# Patient Record
Sex: FEMALE | Race: BLACK OR AFRICAN AMERICAN | NOT HISPANIC OR LATINO | Employment: OTHER | URBAN - METROPOLITAN AREA
[De-identification: names, ages, dates, MRNs, and addresses within clinical notes are randomized per-mention and may not be internally consistent; named-entity substitution may affect disease eponyms.]

---

## 2017-08-02 ENCOUNTER — APPOINTMENT (EMERGENCY)
Dept: RADIOLOGY | Facility: HOSPITAL | Age: 72
End: 2017-08-02
Payer: COMMERCIAL

## 2017-08-02 ENCOUNTER — APPOINTMENT (EMERGENCY)
Dept: CT IMAGING | Facility: HOSPITAL | Age: 72
End: 2017-08-02
Payer: COMMERCIAL

## 2017-08-02 ENCOUNTER — HOSPITAL ENCOUNTER (OUTPATIENT)
Facility: HOSPITAL | Age: 72
Setting detail: OBSERVATION
Discharge: HOME | End: 2017-08-05
Attending: EMERGENCY MEDICINE | Admitting: INTERNAL MEDICINE
Payer: COMMERCIAL

## 2017-08-02 DIAGNOSIS — I16.0 HYPERTENSIVE URGENCY: Primary | ICD-10-CM

## 2017-08-02 PROBLEM — J32.9 SINUSITIS: Status: ACTIVE | Noted: 2017-08-02

## 2017-08-02 PROBLEM — D69.6 THROMBOCYTOPENIA (HCC): Status: ACTIVE | Noted: 2017-08-02

## 2017-08-02 PROBLEM — E87.6 HYPOKALEMIA: Status: ACTIVE | Noted: 2017-08-02

## 2017-08-02 PROBLEM — R42 DIZZINESS: Status: ACTIVE | Noted: 2017-08-02

## 2017-08-02 LAB
ALBUMIN SERPL BCP-MCNC: 3.7 G/DL (ref 3.5–5)
ALP SERPL-CCNC: 58 U/L (ref 46–116)
ALT SERPL W P-5'-P-CCNC: 29 U/L (ref 12–78)
ANION GAP SERPL CALCULATED.3IONS-SCNC: 7 MMOL/L (ref 4–13)
APTT PPP: 24 SECONDS (ref 23–35)
AST SERPL W P-5'-P-CCNC: 27 U/L (ref 5–45)
BACTERIA UR QL AUTO: ABNORMAL /HPF
BASOPHILS # BLD AUTO: 0.03 THOUSANDS/ΜL (ref 0–0.1)
BASOPHILS NFR BLD AUTO: 1 % (ref 0–1)
BILIRUB SERPL-MCNC: 0.8 MG/DL (ref 0.2–1)
BILIRUB UR QL STRIP: NEGATIVE
BUN SERPL-MCNC: 18 MG/DL (ref 5–25)
CALCIUM SERPL-MCNC: 9.7 MG/DL (ref 8.3–10.1)
CHLORIDE SERPL-SCNC: 102 MMOL/L (ref 100–108)
CLARITY UR: CLEAR
CO2 SERPL-SCNC: 31 MMOL/L (ref 21–32)
COLOR UR: YELLOW
CREAT SERPL-MCNC: 0.85 MG/DL (ref 0.6–1.3)
EOSINOPHIL # BLD AUTO: 0.12 THOUSAND/ΜL (ref 0–0.61)
EOSINOPHIL NFR BLD AUTO: 2 % (ref 0–6)
ERYTHROCYTE [DISTWIDTH] IN BLOOD BY AUTOMATED COUNT: 14.7 % (ref 11.6–15.1)
GFR SERPL CREATININE-BSD FRML MDRD: 79 ML/MIN/1.73SQ M
GLUCOSE SERPL-MCNC: 91 MG/DL (ref 65–140)
GLUCOSE UR STRIP-MCNC: NEGATIVE MG/DL
HCT VFR BLD AUTO: 41.4 % (ref 34.8–46.1)
HGB BLD-MCNC: 13.1 G/DL (ref 11.5–15.4)
HGB UR QL STRIP.AUTO: ABNORMAL
INR PPP: 0.82 (ref 0.86–1.16)
KETONES UR STRIP-MCNC: NEGATIVE MG/DL
LEUKOCYTE ESTERASE UR QL STRIP: NEGATIVE
LYMPHOCYTES # BLD AUTO: 1.92 THOUSANDS/ΜL (ref 0.6–4.47)
LYMPHOCYTES NFR BLD AUTO: 39 % (ref 14–44)
MCH RBC QN AUTO: 28.9 PG (ref 26.8–34.3)
MCHC RBC AUTO-ENTMCNC: 31.6 G/DL (ref 31.4–37.4)
MCV RBC AUTO: 91 FL (ref 82–98)
MONOCYTES # BLD AUTO: 0.38 THOUSAND/ΜL (ref 0.17–1.22)
MONOCYTES NFR BLD AUTO: 8 % (ref 4–12)
NEUTROPHILS # BLD AUTO: 2.46 THOUSANDS/ΜL (ref 1.85–7.62)
NEUTS SEG NFR BLD AUTO: 50 % (ref 43–75)
NITRITE UR QL STRIP: NEGATIVE
NON-SQ EPI CELLS URNS QL MICRO: ABNORMAL /HPF
NRBC BLD AUTO-RTO: 0 /100 WBCS
PH UR STRIP.AUTO: 7 [PH] (ref 4.5–8)
PLATELET # BLD AUTO: 128 THOUSANDS/UL (ref 149–390)
PMV BLD AUTO: 12.8 FL (ref 8.9–12.7)
POTASSIUM SERPL-SCNC: 2.9 MMOL/L (ref 3.5–5.3)
PROT SERPL-MCNC: 7.6 G/DL (ref 6.4–8.2)
PROT UR STRIP-MCNC: NEGATIVE MG/DL
PROTHROMBIN TIME: 11.5 SECONDS (ref 12.1–14.4)
RBC # BLD AUTO: 4.54 MILLION/UL (ref 3.81–5.12)
RBC #/AREA URNS AUTO: ABNORMAL /HPF
SODIUM SERPL-SCNC: 140 MMOL/L (ref 136–145)
SP GR UR STRIP.AUTO: 1.01 (ref 1–1.03)
TROPONIN I SERPL-MCNC: <0.02 NG/ML
UROBILINOGEN UR QL STRIP.AUTO: 0.2 E.U./DL
WBC # BLD AUTO: 4.92 THOUSAND/UL (ref 4.31–10.16)
WBC #/AREA URNS AUTO: ABNORMAL /HPF

## 2017-08-02 PROCEDURE — 85025 COMPLETE CBC W/AUTO DIFF WBC: CPT | Performed by: PHYSICIAN ASSISTANT

## 2017-08-02 PROCEDURE — 84484 ASSAY OF TROPONIN QUANT: CPT | Performed by: PHYSICIAN ASSISTANT

## 2017-08-02 PROCEDURE — 93005 ELECTROCARDIOGRAM TRACING: CPT | Performed by: PHYSICIAN ASSISTANT

## 2017-08-02 PROCEDURE — 85610 PROTHROMBIN TIME: CPT | Performed by: PHYSICIAN ASSISTANT

## 2017-08-02 PROCEDURE — 36415 COLL VENOUS BLD VENIPUNCTURE: CPT | Performed by: PHYSICIAN ASSISTANT

## 2017-08-02 PROCEDURE — 81001 URINALYSIS AUTO W/SCOPE: CPT | Performed by: PHYSICIAN ASSISTANT

## 2017-08-02 PROCEDURE — 96374 THER/PROPH/DIAG INJ IV PUSH: CPT

## 2017-08-02 PROCEDURE — 80053 COMPREHEN METABOLIC PANEL: CPT | Performed by: PHYSICIAN ASSISTANT

## 2017-08-02 PROCEDURE — 70450 CT HEAD/BRAIN W/O DYE: CPT

## 2017-08-02 PROCEDURE — 85730 THROMBOPLASTIN TIME PARTIAL: CPT | Performed by: PHYSICIAN ASSISTANT

## 2017-08-02 PROCEDURE — 71020 HB CHEST X-RAY 2VW FRONTAL&LATL: CPT

## 2017-08-02 PROCEDURE — 99285 EMERGENCY DEPT VISIT HI MDM: CPT

## 2017-08-02 RX ORDER — ACETAMINOPHEN 325 MG/1
650 TABLET ORAL EVERY 6 HOURS PRN
Status: DISCONTINUED | OUTPATIENT
Start: 2017-08-02 | End: 2017-08-05 | Stop reason: HOSPADM

## 2017-08-02 RX ORDER — POTASSIUM CHLORIDE 20 MEQ/1
20 TABLET, EXTENDED RELEASE ORAL ONCE
Status: COMPLETED | OUTPATIENT
Start: 2017-08-02 | End: 2017-08-02

## 2017-08-02 RX ORDER — OMEPRAZOLE 20 MG/1
20 CAPSULE, DELAYED RELEASE ORAL DAILY
COMMUNITY
Start: 2017-04-10

## 2017-08-02 RX ORDER — POTASSIUM CHLORIDE 750 MG/1
30 TABLET, EXTENDED RELEASE ORAL DAILY
COMMUNITY
Start: 2017-05-31 | End: 2017-08-05 | Stop reason: HOSPADM

## 2017-08-02 RX ORDER — CHLORTHALIDONE 50 MG/1
25 TABLET ORAL DAILY
COMMUNITY
Start: 2017-05-31 | End: 2017-08-05 | Stop reason: HOSPADM

## 2017-08-02 RX ORDER — CALCIUM CARBONATE 200(500)MG
1000 TABLET,CHEWABLE ORAL DAILY PRN
Status: DISCONTINUED | OUTPATIENT
Start: 2017-08-02 | End: 2017-08-05 | Stop reason: HOSPADM

## 2017-08-02 RX ORDER — ASPIRIN 81 MG/1
81 TABLET, CHEWABLE ORAL DAILY
COMMUNITY
Start: 2012-09-20

## 2017-08-02 RX ORDER — LABETALOL HYDROCHLORIDE 5 MG/ML
10 INJECTION, SOLUTION INTRAVENOUS ONCE
Status: COMPLETED | OUTPATIENT
Start: 2017-08-02 | End: 2017-08-02

## 2017-08-02 RX ORDER — HYDRALAZINE HYDROCHLORIDE 20 MG/ML
10 INJECTION INTRAMUSCULAR; INTRAVENOUS EVERY 4 HOURS PRN
Status: DISCONTINUED | OUTPATIENT
Start: 2017-08-02 | End: 2017-08-05 | Stop reason: HOSPADM

## 2017-08-02 RX ORDER — METOPROLOL SUCCINATE 100 MG/1
100 TABLET, EXTENDED RELEASE ORAL DAILY
COMMUNITY
Start: 2017-05-31 | End: 2017-08-05 | Stop reason: HOSPADM

## 2017-08-02 RX ORDER — ONDANSETRON 2 MG/ML
4 INJECTION INTRAMUSCULAR; INTRAVENOUS EVERY 6 HOURS PRN
Status: DISCONTINUED | OUTPATIENT
Start: 2017-08-02 | End: 2017-08-05 | Stop reason: HOSPADM

## 2017-08-02 RX ADMIN — HYDRALAZINE HYDROCHLORIDE 10 MG: 20 INJECTION INTRAMUSCULAR; INTRAVENOUS at 23:56

## 2017-08-02 RX ADMIN — POTASSIUM CHLORIDE 20 MEQ: 1500 TABLET, EXTENDED RELEASE ORAL at 21:56

## 2017-08-02 RX ADMIN — LABETALOL HYDROCHLORIDE 10 MG: 5 INJECTION, SOLUTION INTRAVENOUS at 21:57

## 2017-08-03 ENCOUNTER — APPOINTMENT (OUTPATIENT)
Dept: NON INVASIVE DIAGNOSTICS | Facility: HOSPITAL | Age: 72
End: 2017-08-03
Payer: COMMERCIAL

## 2017-08-03 PROBLEM — I16.9 HYPERTENSIVE CRISIS: Status: ACTIVE | Noted: 2017-08-02

## 2017-08-03 PROBLEM — D69.6 THROMBOCYTOPENIA (HCC): Status: RESOLVED | Noted: 2017-08-02 | Resolved: 2017-08-03

## 2017-08-03 LAB
ANION GAP SERPL CALCULATED.3IONS-SCNC: 9 MMOL/L (ref 4–13)
APTT PPP: 27 SECONDS (ref 23–35)
BUN SERPL-MCNC: 14 MG/DL (ref 5–25)
CALCIUM SERPL-MCNC: 9.7 MG/DL (ref 8.3–10.1)
CHLORIDE SERPL-SCNC: 104 MMOL/L (ref 100–108)
CO2 SERPL-SCNC: 30 MMOL/L (ref 21–32)
CREAT SERPL-MCNC: 0.83 MG/DL (ref 0.6–1.3)
ERYTHROCYTE [DISTWIDTH] IN BLOOD BY AUTOMATED COUNT: 14.6 % (ref 11.6–15.1)
GFR SERPL CREATININE-BSD FRML MDRD: 81 ML/MIN/1.73SQ M
GLUCOSE P FAST SERPL-MCNC: 101 MG/DL (ref 65–99)
GLUCOSE SERPL-MCNC: 101 MG/DL (ref 65–140)
HCT VFR BLD AUTO: 40.4 % (ref 34.8–46.1)
HGB BLD-MCNC: 13.2 G/DL (ref 11.5–15.4)
INR PPP: 0.87 (ref 0.86–1.16)
MAGNESIUM SERPL-MCNC: 1.6 MG/DL (ref 1.6–2.6)
MCH RBC QN AUTO: 29.1 PG (ref 26.8–34.3)
MCHC RBC AUTO-ENTMCNC: 32.7 G/DL (ref 31.4–37.4)
MCV RBC AUTO: 89 FL (ref 82–98)
PLATELET # BLD AUTO: 162 THOUSANDS/UL (ref 149–390)
PLATELET # BLD AUTO: 166 THOUSANDS/UL (ref 149–390)
PMV BLD AUTO: 12.7 FL (ref 8.9–12.7)
PMV BLD AUTO: 12.9 FL (ref 8.9–12.7)
POTASSIUM SERPL-SCNC: 2.8 MMOL/L (ref 3.5–5.3)
POTASSIUM SERPL-SCNC: 3.3 MMOL/L (ref 3.5–5.3)
PROTHROMBIN TIME: 12 SECONDS (ref 12.1–14.4)
RBC # BLD AUTO: 4.54 MILLION/UL (ref 3.81–5.12)
SODIUM SERPL-SCNC: 143 MMOL/L (ref 136–145)
TSH SERPL DL<=0.05 MIU/L-ACNC: 5.41 UIU/ML (ref 0.36–3.74)
WBC # BLD AUTO: 5.08 THOUSAND/UL (ref 4.31–10.16)

## 2017-08-03 PROCEDURE — 80048 BASIC METABOLIC PNL TOTAL CA: CPT | Performed by: PHYSICIAN ASSISTANT

## 2017-08-03 PROCEDURE — 84443 ASSAY THYROID STIM HORMONE: CPT | Performed by: PHYSICIAN ASSISTANT

## 2017-08-03 PROCEDURE — 85730 THROMBOPLASTIN TIME PARTIAL: CPT | Performed by: PHYSICIAN ASSISTANT

## 2017-08-03 PROCEDURE — 85049 AUTOMATED PLATELET COUNT: CPT | Performed by: INTERNAL MEDICINE

## 2017-08-03 PROCEDURE — 83735 ASSAY OF MAGNESIUM: CPT | Performed by: PHYSICIAN ASSISTANT

## 2017-08-03 PROCEDURE — 85610 PROTHROMBIN TIME: CPT | Performed by: PHYSICIAN ASSISTANT

## 2017-08-03 PROCEDURE — 93306 TTE W/DOPPLER COMPLETE: CPT

## 2017-08-03 PROCEDURE — 85027 COMPLETE CBC AUTOMATED: CPT | Performed by: PHYSICIAN ASSISTANT

## 2017-08-03 PROCEDURE — 84132 ASSAY OF SERUM POTASSIUM: CPT | Performed by: INTERNAL MEDICINE

## 2017-08-03 RX ORDER — POTASSIUM CHLORIDE 20 MEQ/1
40 TABLET, EXTENDED RELEASE ORAL 2 TIMES DAILY
Status: DISCONTINUED | OUTPATIENT
Start: 2017-08-03 | End: 2017-08-05 | Stop reason: HOSPADM

## 2017-08-03 RX ORDER — CARVEDILOL 12.5 MG/1
25 TABLET ORAL 2 TIMES DAILY WITH MEALS
Status: DISCONTINUED | OUTPATIENT
Start: 2017-08-03 | End: 2017-08-05 | Stop reason: HOSPADM

## 2017-08-03 RX ORDER — POTASSIUM CHLORIDE 20 MEQ/1
40 TABLET, EXTENDED RELEASE ORAL ONCE
Status: COMPLETED | OUTPATIENT
Start: 2017-08-03 | End: 2017-08-03

## 2017-08-03 RX ORDER — CHLORTHALIDONE 25 MG/1
25 TABLET ORAL DAILY
Status: DISCONTINUED | OUTPATIENT
Start: 2017-08-03 | End: 2017-08-05 | Stop reason: HOSPADM

## 2017-08-03 RX ADMIN — ENOXAPARIN SODIUM 40 MG: 40 INJECTION SUBCUTANEOUS at 09:19

## 2017-08-03 RX ADMIN — POTASSIUM CHLORIDE 40 MEQ: 1500 TABLET, EXTENDED RELEASE ORAL at 09:19

## 2017-08-03 RX ADMIN — Medication 400 MG: at 17:14

## 2017-08-03 RX ADMIN — POTASSIUM CHLORIDE 40 MEQ: 1500 TABLET, EXTENDED RELEASE ORAL at 22:12

## 2017-08-03 RX ADMIN — CARVEDILOL 25 MG: 12.5 TABLET, FILM COATED ORAL at 17:14

## 2017-08-03 RX ADMIN — CARVEDILOL 25 MG: 12.5 TABLET, FILM COATED ORAL at 11:07

## 2017-08-03 RX ADMIN — POTASSIUM CHLORIDE 40 MEQ: 1500 TABLET, EXTENDED RELEASE ORAL at 14:15

## 2017-08-03 RX ADMIN — CHLORTHALIDONE 25 MG: 25 TABLET ORAL at 11:08

## 2017-08-03 RX ADMIN — Medication 400 MG: at 14:15

## 2017-08-04 LAB
ANION GAP SERPL CALCULATED.3IONS-SCNC: 8 MMOL/L (ref 4–13)
BUN SERPL-MCNC: 16 MG/DL (ref 5–25)
CALCIUM SERPL-MCNC: 9.4 MG/DL (ref 8.3–10.1)
CHLORIDE SERPL-SCNC: 104 MMOL/L (ref 100–108)
CO2 SERPL-SCNC: 28 MMOL/L (ref 21–32)
CREAT SERPL-MCNC: 0.83 MG/DL (ref 0.6–1.3)
GFR SERPL CREATININE-BSD FRML MDRD: 81 ML/MIN/1.73SQ M
GLUCOSE P FAST SERPL-MCNC: 110 MG/DL (ref 65–99)
GLUCOSE SERPL-MCNC: 110 MG/DL (ref 65–140)
MAGNESIUM SERPL-MCNC: 1.7 MG/DL (ref 1.6–2.6)
POTASSIUM SERPL-SCNC: 3.4 MMOL/L (ref 3.5–5.3)
SODIUM SERPL-SCNC: 140 MMOL/L (ref 136–145)

## 2017-08-04 PROCEDURE — G8980 MOBILITY D/C STATUS: HCPCS

## 2017-08-04 PROCEDURE — 97162 PT EVAL MOD COMPLEX 30 MIN: CPT

## 2017-08-04 PROCEDURE — G8978 MOBILITY CURRENT STATUS: HCPCS

## 2017-08-04 PROCEDURE — 83735 ASSAY OF MAGNESIUM: CPT | Performed by: GENERAL PRACTICE

## 2017-08-04 PROCEDURE — 80048 BASIC METABOLIC PNL TOTAL CA: CPT | Performed by: INTERNAL MEDICINE

## 2017-08-04 RX ADMIN — ENOXAPARIN SODIUM 40 MG: 40 INJECTION SUBCUTANEOUS at 09:14

## 2017-08-04 RX ADMIN — CHLORTHALIDONE 25 MG: 25 TABLET ORAL at 09:15

## 2017-08-04 RX ADMIN — Medication 400 MG: at 09:13

## 2017-08-04 RX ADMIN — POTASSIUM CHLORIDE 40 MEQ: 1500 TABLET, EXTENDED RELEASE ORAL at 17:00

## 2017-08-04 RX ADMIN — POTASSIUM CHLORIDE 40 MEQ: 1500 TABLET, EXTENDED RELEASE ORAL at 09:13

## 2017-08-04 RX ADMIN — Medication 400 MG: at 17:00

## 2017-08-04 RX ADMIN — CARVEDILOL 25 MG: 12.5 TABLET, FILM COATED ORAL at 09:14

## 2017-08-04 RX ADMIN — CARVEDILOL 25 MG: 12.5 TABLET, FILM COATED ORAL at 16:58

## 2017-08-05 VITALS
OXYGEN SATURATION: 98 % | BODY MASS INDEX: 35.07 KG/M2 | TEMPERATURE: 98.2 F | WEIGHT: 205.4 LBS | RESPIRATION RATE: 18 BRPM | HEART RATE: 74 BPM | DIASTOLIC BLOOD PRESSURE: 65 MMHG | SYSTOLIC BLOOD PRESSURE: 132 MMHG | HEIGHT: 64 IN

## 2017-08-05 PROBLEM — E87.6 HYPOKALEMIA: Status: RESOLVED | Noted: 2017-08-02 | Resolved: 2017-08-05

## 2017-08-05 PROBLEM — I16.9 HYPERTENSIVE CRISIS: Status: RESOLVED | Noted: 2017-08-02 | Resolved: 2017-08-05

## 2017-08-05 PROBLEM — I10 HYPERTENSION: Status: ACTIVE | Noted: 2017-08-05

## 2017-08-05 LAB
ANION GAP SERPL CALCULATED.3IONS-SCNC: 6 MMOL/L (ref 4–13)
BASOPHILS # BLD AUTO: 0.04 THOUSANDS/ΜL (ref 0–0.1)
BASOPHILS NFR BLD AUTO: 1 % (ref 0–1)
BUN SERPL-MCNC: 18 MG/DL (ref 5–25)
CALCIUM SERPL-MCNC: 9.1 MG/DL (ref 8.3–10.1)
CHLORIDE SERPL-SCNC: 105 MMOL/L (ref 100–108)
CO2 SERPL-SCNC: 29 MMOL/L (ref 21–32)
CREAT SERPL-MCNC: 0.85 MG/DL (ref 0.6–1.3)
EOSINOPHIL # BLD AUTO: 0.12 THOUSAND/ΜL (ref 0–0.61)
EOSINOPHIL NFR BLD AUTO: 3 % (ref 0–6)
ERYTHROCYTE [DISTWIDTH] IN BLOOD BY AUTOMATED COUNT: 14.9 % (ref 11.6–15.1)
GFR SERPL CREATININE-BSD FRML MDRD: 79 ML/MIN/1.73SQ M
GLUCOSE P FAST SERPL-MCNC: 106 MG/DL (ref 65–99)
GLUCOSE SERPL-MCNC: 106 MG/DL (ref 65–140)
HCT VFR BLD AUTO: 39.3 % (ref 34.8–46.1)
HGB BLD-MCNC: 12.5 G/DL (ref 11.5–15.4)
LYMPHOCYTES # BLD AUTO: 1.75 THOUSANDS/ΜL (ref 0.6–4.47)
LYMPHOCYTES NFR BLD AUTO: 39 % (ref 14–44)
MAGNESIUM SERPL-MCNC: 1.7 MG/DL (ref 1.6–2.6)
MCH RBC QN AUTO: 28.9 PG (ref 26.8–34.3)
MCHC RBC AUTO-ENTMCNC: 31.8 G/DL (ref 31.4–37.4)
MCV RBC AUTO: 91 FL (ref 82–98)
MONOCYTES # BLD AUTO: 0.51 THOUSAND/ΜL (ref 0.17–1.22)
MONOCYTES NFR BLD AUTO: 11 % (ref 4–12)
NEUTROPHILS # BLD AUTO: 2.07 THOUSANDS/ΜL (ref 1.85–7.62)
NEUTS SEG NFR BLD AUTO: 46 % (ref 43–75)
NRBC BLD AUTO-RTO: 0 /100 WBCS
PLATELET # BLD AUTO: 159 THOUSANDS/UL (ref 149–390)
PMV BLD AUTO: 12.7 FL (ref 8.9–12.7)
POTASSIUM SERPL-SCNC: 3.8 MMOL/L (ref 3.5–5.3)
RBC # BLD AUTO: 4.33 MILLION/UL (ref 3.81–5.12)
SODIUM SERPL-SCNC: 140 MMOL/L (ref 136–145)
WBC # BLD AUTO: 4.5 THOUSAND/UL (ref 4.31–10.16)

## 2017-08-05 PROCEDURE — 80048 BASIC METABOLIC PNL TOTAL CA: CPT | Performed by: GENERAL PRACTICE

## 2017-08-05 PROCEDURE — 83735 ASSAY OF MAGNESIUM: CPT | Performed by: GENERAL PRACTICE

## 2017-08-05 PROCEDURE — 85025 COMPLETE CBC W/AUTO DIFF WBC: CPT | Performed by: GENERAL PRACTICE

## 2017-08-05 RX ORDER — POTASSIUM CHLORIDE 20 MEQ/1
20 TABLET, EXTENDED RELEASE ORAL 2 TIMES DAILY
Qty: 20 TABLET | Refills: 0 | Status: SHIPPED | OUTPATIENT
Start: 2017-08-05 | End: 2017-08-25 | Stop reason: HOSPADM

## 2017-08-05 RX ORDER — CARVEDILOL 25 MG/1
25 TABLET ORAL 2 TIMES DAILY WITH MEALS
Qty: 60 TABLET | Refills: 0 | Status: ON HOLD | OUTPATIENT
Start: 2017-08-05 | End: 2017-08-15

## 2017-08-05 RX ORDER — CHLORTHALIDONE 25 MG/1
25 TABLET ORAL DAILY
Qty: 30 TABLET | Refills: 0 | Status: SHIPPED | OUTPATIENT
Start: 2017-08-05

## 2017-08-05 RX ADMIN — ENOXAPARIN SODIUM 40 MG: 40 INJECTION SUBCUTANEOUS at 08:45

## 2017-08-05 RX ADMIN — CHLORTHALIDONE 25 MG: 25 TABLET ORAL at 08:45

## 2017-08-05 RX ADMIN — Medication 400 MG: at 08:45

## 2017-08-05 RX ADMIN — POTASSIUM CHLORIDE 40 MEQ: 1500 TABLET, EXTENDED RELEASE ORAL at 08:45

## 2017-08-05 RX ADMIN — CARVEDILOL 25 MG: 12.5 TABLET, FILM COATED ORAL at 08:44

## 2017-08-13 ENCOUNTER — APPOINTMENT (EMERGENCY)
Dept: RADIOLOGY | Facility: HOSPITAL | Age: 72
DRG: 313 | End: 2017-08-13
Payer: COMMERCIAL

## 2017-08-13 ENCOUNTER — APPOINTMENT (EMERGENCY)
Dept: CT IMAGING | Facility: HOSPITAL | Age: 72
DRG: 313 | End: 2017-08-13
Payer: COMMERCIAL

## 2017-08-13 ENCOUNTER — HOSPITAL ENCOUNTER (INPATIENT)
Facility: HOSPITAL | Age: 72
LOS: 1 days | Discharge: HOME | DRG: 313 | End: 2017-08-15
Attending: EMERGENCY MEDICINE | Admitting: INTERNAL MEDICINE
Payer: COMMERCIAL

## 2017-08-13 DIAGNOSIS — R07.89 CHEST TIGHTNESS: Primary | ICD-10-CM

## 2017-08-13 DIAGNOSIS — R06.00 DYSPNEA: ICD-10-CM

## 2017-08-13 DIAGNOSIS — E87.6 HYPOKALEMIA: ICD-10-CM

## 2017-08-13 DIAGNOSIS — N17.9 AKI (ACUTE KIDNEY INJURY) (HCC): ICD-10-CM

## 2017-08-13 DIAGNOSIS — R42 LIGHTHEADEDNESS: ICD-10-CM

## 2017-08-13 DIAGNOSIS — R91.1 NODULE OF LEFT LUNG: ICD-10-CM

## 2017-08-13 PROBLEM — R07.9 CHEST PAIN: Status: ACTIVE | Noted: 2017-08-13

## 2017-08-13 PROBLEM — R79.89 POSITIVE D DIMER: Status: ACTIVE | Noted: 2017-08-13

## 2017-08-13 LAB
ALBUMIN SERPL BCP-MCNC: 3.7 G/DL (ref 3.5–5)
ALP SERPL-CCNC: 77 U/L (ref 46–116)
ALT SERPL W P-5'-P-CCNC: 24 U/L (ref 12–78)
ANION GAP SERPL CALCULATED.3IONS-SCNC: 6 MMOL/L (ref 4–13)
AST SERPL W P-5'-P-CCNC: 22 U/L (ref 5–45)
BASOPHILS # BLD AUTO: 0.04 THOUSANDS/ΜL (ref 0–0.1)
BASOPHILS NFR BLD AUTO: 1 % (ref 0–1)
BILIRUB SERPL-MCNC: 0.7 MG/DL (ref 0.2–1)
BUN SERPL-MCNC: 21 MG/DL (ref 5–25)
CALCIUM SERPL-MCNC: 9.7 MG/DL (ref 8.3–10.1)
CHLORIDE SERPL-SCNC: 103 MMOL/L (ref 100–108)
CO2 SERPL-SCNC: 32 MMOL/L (ref 21–32)
CREAT SERPL-MCNC: 1.41 MG/DL (ref 0.6–1.3)
DEPRECATED D DIMER PPP: 799 NG/ML (FEU) (ref 0–424)
EOSINOPHIL # BLD AUTO: 0.13 THOUSAND/ΜL (ref 0–0.61)
EOSINOPHIL NFR BLD AUTO: 3 % (ref 0–6)
ERYTHROCYTE [DISTWIDTH] IN BLOOD BY AUTOMATED COUNT: 14.9 % (ref 11.6–15.1)
GFR SERPL CREATININE-BSD FRML MDRD: 43 ML/MIN/1.73SQ M
GLUCOSE SERPL-MCNC: 120 MG/DL (ref 65–140)
HCT VFR BLD AUTO: 39.7 % (ref 34.8–46.1)
HGB BLD-MCNC: 12.8 G/DL (ref 11.5–15.4)
LYMPHOCYTES # BLD AUTO: 1.74 THOUSANDS/ΜL (ref 0.6–4.47)
LYMPHOCYTES NFR BLD AUTO: 35 % (ref 14–44)
MAGNESIUM SERPL-MCNC: 1.6 MG/DL (ref 1.6–2.6)
MCH RBC QN AUTO: 29.2 PG (ref 26.8–34.3)
MCHC RBC AUTO-ENTMCNC: 32.2 G/DL (ref 31.4–37.4)
MCV RBC AUTO: 90 FL (ref 82–98)
MONOCYTES # BLD AUTO: 0.54 THOUSAND/ΜL (ref 0.17–1.22)
MONOCYTES NFR BLD AUTO: 11 % (ref 4–12)
NEUTROPHILS # BLD AUTO: 2.59 THOUSANDS/ΜL (ref 1.85–7.62)
NEUTS SEG NFR BLD AUTO: 51 % (ref 43–75)
NRBC BLD AUTO-RTO: 0 /100 WBCS
NT-PROBNP SERPL-MCNC: 49 PG/ML
PLATELET # BLD AUTO: 170 THOUSANDS/UL (ref 149–390)
PLATELET # BLD AUTO: 174 THOUSANDS/UL (ref 149–390)
PMV BLD AUTO: 12.8 FL (ref 8.9–12.7)
PMV BLD AUTO: 13 FL (ref 8.9–12.7)
POTASSIUM SERPL-SCNC: 3.3 MMOL/L (ref 3.5–5.3)
PROT SERPL-MCNC: 7.6 G/DL (ref 6.4–8.2)
RBC # BLD AUTO: 4.39 MILLION/UL (ref 3.81–5.12)
SODIUM SERPL-SCNC: 141 MMOL/L (ref 136–145)
TROPONIN I SERPL-MCNC: <0.02 NG/ML
TROPONIN I SERPL-MCNC: <0.02 NG/ML
WBC # BLD AUTO: 5.05 THOUSAND/UL (ref 4.31–10.16)

## 2017-08-13 PROCEDURE — 71020 HB CHEST X-RAY 2VW FRONTAL&LATL: CPT

## 2017-08-13 PROCEDURE — 71275 CT ANGIOGRAPHY CHEST: CPT

## 2017-08-13 PROCEDURE — 83880 ASSAY OF NATRIURETIC PEPTIDE: CPT | Performed by: PHYSICIAN ASSISTANT

## 2017-08-13 PROCEDURE — 93005 ELECTROCARDIOGRAM TRACING: CPT | Performed by: EMERGENCY MEDICINE

## 2017-08-13 PROCEDURE — 99285 EMERGENCY DEPT VISIT HI MDM: CPT

## 2017-08-13 PROCEDURE — 84484 ASSAY OF TROPONIN QUANT: CPT | Performed by: PHYSICIAN ASSISTANT

## 2017-08-13 PROCEDURE — 85049 AUTOMATED PLATELET COUNT: CPT | Performed by: PHYSICIAN ASSISTANT

## 2017-08-13 PROCEDURE — 36415 COLL VENOUS BLD VENIPUNCTURE: CPT | Performed by: EMERGENCY MEDICINE

## 2017-08-13 PROCEDURE — 85025 COMPLETE CBC W/AUTO DIFF WBC: CPT | Performed by: EMERGENCY MEDICINE

## 2017-08-13 PROCEDURE — 83735 ASSAY OF MAGNESIUM: CPT | Performed by: EMERGENCY MEDICINE

## 2017-08-13 PROCEDURE — 96360 HYDRATION IV INFUSION INIT: CPT

## 2017-08-13 PROCEDURE — 80053 COMPREHEN METABOLIC PANEL: CPT | Performed by: EMERGENCY MEDICINE

## 2017-08-13 PROCEDURE — 84484 ASSAY OF TROPONIN QUANT: CPT | Performed by: EMERGENCY MEDICINE

## 2017-08-13 PROCEDURE — 85379 FIBRIN DEGRADATION QUANT: CPT | Performed by: EMERGENCY MEDICINE

## 2017-08-13 RX ORDER — MAGNESIUM SULFATE HEPTAHYDRATE 40 MG/ML
2 INJECTION, SOLUTION INTRAVENOUS ONCE
Status: COMPLETED | OUTPATIENT
Start: 2017-08-13 | End: 2017-08-13

## 2017-08-13 RX ORDER — PANTOPRAZOLE SODIUM 40 MG/1
40 TABLET, DELAYED RELEASE ORAL
Status: DISCONTINUED | OUTPATIENT
Start: 2017-08-14 | End: 2017-08-15 | Stop reason: HOSPADM

## 2017-08-13 RX ORDER — CARVEDILOL 12.5 MG/1
25 TABLET ORAL 2 TIMES DAILY WITH MEALS
Status: DISCONTINUED | OUTPATIENT
Start: 2017-08-14 | End: 2017-08-14

## 2017-08-13 RX ORDER — POTASSIUM CHLORIDE 20 MEQ/1
20 TABLET, EXTENDED RELEASE ORAL 2 TIMES DAILY
Status: DISCONTINUED | OUTPATIENT
Start: 2017-08-14 | End: 2017-08-15 | Stop reason: HOSPADM

## 2017-08-13 RX ORDER — NITROGLYCERIN 0.4 MG/1
0.4 TABLET SUBLINGUAL ONCE
Status: COMPLETED | OUTPATIENT
Start: 2017-08-13 | End: 2017-08-13

## 2017-08-13 RX ORDER — SODIUM CHLORIDE 9 MG/ML
100 INJECTION, SOLUTION INTRAVENOUS CONTINUOUS
Status: DISCONTINUED | OUTPATIENT
Start: 2017-08-13 | End: 2017-08-14

## 2017-08-13 RX ORDER — CALCIUM CARBONATE 200(500)MG
1000 TABLET,CHEWABLE ORAL DAILY PRN
Status: DISCONTINUED | OUTPATIENT
Start: 2017-08-13 | End: 2017-08-15 | Stop reason: HOSPADM

## 2017-08-13 RX ORDER — ONDANSETRON 2 MG/ML
4 INJECTION INTRAMUSCULAR; INTRAVENOUS EVERY 6 HOURS PRN
Status: DISCONTINUED | OUTPATIENT
Start: 2017-08-13 | End: 2017-08-15 | Stop reason: HOSPADM

## 2017-08-13 RX ORDER — ATORVASTATIN CALCIUM 40 MG/1
40 TABLET, FILM COATED ORAL EVERY EVENING
Status: DISCONTINUED | OUTPATIENT
Start: 2017-08-13 | End: 2017-08-15 | Stop reason: HOSPADM

## 2017-08-13 RX ORDER — ASPIRIN 81 MG/1
81 TABLET, CHEWABLE ORAL DAILY
Status: DISCONTINUED | OUTPATIENT
Start: 2017-08-14 | End: 2017-08-15 | Stop reason: HOSPADM

## 2017-08-13 RX ORDER — HYDRALAZINE HYDROCHLORIDE 20 MG/ML
5 INJECTION INTRAMUSCULAR; INTRAVENOUS EVERY 4 HOURS PRN
Status: DISCONTINUED | OUTPATIENT
Start: 2017-08-13 | End: 2017-08-15 | Stop reason: HOSPADM

## 2017-08-13 RX ORDER — NITROGLYCERIN 0.4 MG/1
0.4 TABLET SUBLINGUAL
Status: DISCONTINUED | OUTPATIENT
Start: 2017-08-13 | End: 2017-08-15 | Stop reason: HOSPADM

## 2017-08-13 RX ORDER — ACETAMINOPHEN 325 MG/1
650 TABLET ORAL EVERY 6 HOURS PRN
Status: DISCONTINUED | OUTPATIENT
Start: 2017-08-13 | End: 2017-08-15 | Stop reason: HOSPADM

## 2017-08-13 RX ORDER — POTASSIUM CHLORIDE 20 MEQ/1
40 TABLET, EXTENDED RELEASE ORAL ONCE
Status: COMPLETED | OUTPATIENT
Start: 2017-08-13 | End: 2017-08-13

## 2017-08-13 RX ORDER — ASPIRIN 81 MG/1
243 TABLET, CHEWABLE ORAL ONCE
Status: COMPLETED | OUTPATIENT
Start: 2017-08-13 | End: 2017-08-13

## 2017-08-13 RX ADMIN — ATORVASTATIN CALCIUM 40 MG: 40 TABLET, FILM COATED ORAL at 22:58

## 2017-08-13 RX ADMIN — SODIUM CHLORIDE 100 ML/HR: 0.9 INJECTION, SOLUTION INTRAVENOUS at 22:21

## 2017-08-13 RX ADMIN — ASPIRIN 81 MG 243 MG: 81 TABLET ORAL at 19:16

## 2017-08-13 RX ADMIN — POTASSIUM CHLORIDE 40 MEQ: 1500 TABLET, EXTENDED RELEASE ORAL at 20:55

## 2017-08-13 RX ADMIN — SODIUM CHLORIDE 1000 ML: 0.9 INJECTION, SOLUTION INTRAVENOUS at 19:12

## 2017-08-13 RX ADMIN — IODIXANOL 85 ML: 320 INJECTION, SOLUTION INTRAVASCULAR at 20:04

## 2017-08-13 RX ADMIN — NITROGLYCERIN 0.4 MG: 0.4 TABLET SUBLINGUAL at 19:17

## 2017-08-13 RX ADMIN — MAGNESIUM SULFATE HEPTAHYDRATE 2 G: 40 INJECTION, SOLUTION INTRAVENOUS at 20:48

## 2017-08-13 RX ADMIN — ONDANSETRON 4 MG: 2 INJECTION INTRAMUSCULAR; INTRAVENOUS at 22:59

## 2017-08-13 NOTE — ED PROVIDER NOTES
History  Chief Complaint   Patient presents with    Chest Pain     Pt c/o CP (tightness) and SOB since this morning  Patient is a 78-year-old female with past medical history significant for hypertension, GERD, and prior history of left lower leg phlebitis the patient states she was treated with Coumadin for (patient unaware of diagnosis of DVT and used the word "phlebitis"), presents to the emergency department complaining of lightheadedness, central and left-sided chest pain as well as difficulty taking a deep breath  She states that she has been feeling lightheaded and dizzy for the past 2 weeks  According to medical records, patient was admitted to the hospital on 08/02 for dizziness that was presumed to be secondary to hypertension and possibly related to blood pressure medications  She was evaluated by Cardiology and was started on carvedilol and told to discontinue her metoprolol  She had an echocardiogram which showed normal EF of 60 percent, preserved LV function and no regional wall motion abnormalities  She had one negative troponin in the ED on 8/2 but did not have a stress test while she was in the hospital and denies ever having a stress test  She reports that her lightheadedness and dizziness has not improved  She saw her family doctor yesterday who told her to cut the carvedilol in half and take half in the morning and half at night  She did this but has not gone improvement  She states today at around 2:00 p m  she developed central and left lower chest tightness as well as difficulty taking a deep breath  She states the symptoms have been coming and going for the past several hours  She denies feeling this type of pain or dyspnea before    She denies any associated fever, chills, diaphoresis, headache, vertigo, recent URI symptoms or cough, neck/jaw/back pain, palpitations, abdominal pain, nausea, vomiting, diarrhea, constipation, bright red blood per rectum, melena, urinary symptoms, lateralizing extremity weakness or paresthesia, other focal neurologic deficits, leg swelling or pain  She does report that she recently traveled to and from [place] about 3 weeks ago  She denies any exogenous hormone use  Denies significant family history of cardiac disease  Denies smoking history  History provided by:  Patient and relative (Daughter)   used: No    Chest Pain   Associated symptoms: shortness of breath    Associated symptoms: no abdominal pain, no back pain, no cough, no diaphoresis, no dizziness, no fatigue, no fever, no headache, no nausea, no numbness, no palpitations, not vomiting and no weakness        Prior to Admission Medications   Prescriptions Last Dose Informant Patient Reported? Taking?   aspirin 81 mg chewable tablet   Yes No   Sig: Chew 81 mg daily   carvedilol (COREG) 25 mg tablet   No No   Sig: Take 1 tablet by mouth 2 (two) times a day with meals   chlorthalidone 25 mg tablet   No No   Sig: Take 1 tablet by mouth daily   magnesium oxide (MAG-OX) 400 mg   No No   Sig: Take 1 tablet by mouth daily for 5 days   omeprazole (PriLOSEC) 20 mg delayed release capsule   Yes No   Sig: Take 20 mg by mouth daily   potassium chloride (K-DUR,KLOR-CON) 20 mEq tablet   No No   Sig: Take 1 tablet by mouth 2 (two) times a day for 20 doses      Facility-Administered Medications: None       Past Medical History:   Diagnosis Date    Hypertension        Past Surgical History:   Procedure Laterality Date    BREAST CYST EXCISION         History reviewed  No pertinent family history  I have reviewed and agree with the history as documented  Social History   Substance Use Topics    Smoking status: Never Smoker    Smokeless tobacco: Not on file    Alcohol use Yes      Comment: Socially        Review of Systems   Constitutional: Negative for chills, diaphoresis, fatigue and fever  HENT: Negative for congestion, ear pain, rhinorrhea and sore throat      Eyes: Negative for photophobia, pain and visual disturbance  Respiratory: Positive for chest tightness and shortness of breath  Negative for cough and wheezing  Cardiovascular: Positive for chest pain  Negative for palpitations  Gastrointestinal: Negative for abdominal pain, blood in stool, constipation, diarrhea, nausea and vomiting  Genitourinary: Negative for dysuria, flank pain, frequency and hematuria  Musculoskeletal: Negative for back pain, neck pain and neck stiffness  Skin: Negative for color change, pallor and rash  Allergic/Immunologic: Negative for immunocompromised state  Neurological: Positive for light-headedness  Negative for dizziness, syncope, facial asymmetry, speech difficulty, weakness, numbness and headaches  Hematological: Negative for adenopathy  Psychiatric/Behavioral: Negative for confusion and decreased concentration  All other systems reviewed and are negative  Physical Exam  ED Triage Vitals   Temperature Pulse Respirations Blood Pressure SpO2   08/13/17 1839 08/13/17 1835 08/13/17 1835 08/13/17 1833 08/13/17 1833   98 7 °F (37 1 °C) 85 18 (!) 208/98 97 %      Temp Source Heart Rate Source Patient Position BP Location FiO2 (%)   08/13/17 1839 08/13/17 1918 08/13/17 1833 08/13/17 1833 --   Oral Monitor Lying Right arm       Pain Score       08/13/17 1835       No Pain         Vitals:    08/13/17 1835 08/13/17 1839 08/13/17 1918 08/13/17 2040   BP:   (!) 193/91 170/82   Pulse: 85  75 69   Resp: 18  (!) 24 (!) 30   Temp:  98 7 °F (37 1 °C)     TempSrc:  Oral     SpO2: 96%  98% 99%   Weight:           Physical Exam   Constitutional: She is oriented to person, place, and time  She appears well-developed and well-nourished  No distress  HENT:   Head: Normocephalic and atraumatic  Mouth/Throat: Oropharynx is clear and moist  No oropharyngeal exudate  Eyes: Conjunctivae and EOM are normal  Pupils are equal, round, and reactive to light  Neck: Normal range of motion  Neck supple  No JVD present  Cardiovascular: Normal rate, regular rhythm, normal heart sounds and intact distal pulses  Exam reveals no gallop and no friction rub  No murmur heard  Pulmonary/Chest: Effort normal and breath sounds normal  No respiratory distress  She has no wheezes  She has no rales  She exhibits no tenderness  Abdominal: Soft  Bowel sounds are normal  She exhibits no distension  There is no tenderness  There is no rebound and no guarding  Musculoskeletal: Normal range of motion  She exhibits no edema or tenderness  No calf tenderness or swelling  Lymphadenopathy:     She has no cervical adenopathy  Neurological: She is alert and oriented to person, place, and time  No cranial nerve deficit  No gross motor or sensory deficits  5/5 strength throughout  Skin: Skin is warm and dry  No rash noted  She is not diaphoretic  No erythema  No pallor  Psychiatric: She has a normal mood and affect  Her behavior is normal    Nursing note and vitals reviewed        ED Medications  Medications   magnesium sulfate 2 g/50 mL IVPB (premix) 2 g (2 g Intravenous New Bag 8/13/17 2048)   sodium chloride 0 9 % bolus 1,000 mL (0 mL Intravenous Stopped 8/13/17 2039)   aspirin chewable tablet 243 mg (243 mg Oral Given 8/13/17 1916)   nitroglycerin (NITROSTAT) SL tablet 0 4 mg (0 4 mg Sublingual Given 8/13/17 1917)   potassium chloride (K-DUR,KLOR-CON) CR tablet 40 mEq (40 mEq Oral Given 8/13/17 2055)   iodixanol (VISIPAQUE) 320 MG/ML injection 85 mL (85 mL Intravenous Given 8/13/17 2004)       Diagnostic Studies  Labs Reviewed   CBC AND DIFFERENTIAL - Abnormal        Result Value Ref Range Status    MPV 13 0 (*) 8 9 - 12 7 fL Final    WBC 5 05  4 31 - 10 16 Thousand/uL Final    RBC 4 39  3 81 - 5 12 Million/uL Final    Hemoglobin 12 8  11 5 - 15 4 g/dL Final    Hematocrit 39 7  34 8 - 46 1 % Final    MCV 90  82 - 98 fL Final    MCH 29 2  26 8 - 34 3 pg Final    MCHC 32 2  31 4 - 37 4 g/dL Final    RDW 14 9  11 6 - 15 1 % Final    Platelets 284  799 - 390 Thousands/uL Final    nRBC 0  /100 WBCs Final    Neutrophils Relative 51  43 - 75 % Final    Lymphocytes Relative 35  14 - 44 % Final    Monocytes Relative 11  4 - 12 % Final    Eosinophils Relative 3  0 - 6 % Final    Basophils Relative 1  0 - 1 % Final    Neutrophils Absolute 2 59  1 85 - 7 62 Thousands/µL Final    Lymphocytes Absolute 1 74  0 60 - 4 47 Thousands/µL Final    Monocytes Absolute 0 54  0 17 - 1 22 Thousand/µL Final    Eosinophils Absolute 0 13  0 00 - 0 61 Thousand/µL Final    Basophils Absolute 0 04  0 00 - 0 10 Thousands/µL Final   COMPREHENSIVE METABOLIC PANEL - Abnormal     Potassium 3 3 (*) 3 5 - 5 3 mmol/L Final    Creatinine 1 41 (*) 0 60 - 1 30 mg/dL Final    Comment: Standardized to IDMS reference method    Sodium 141  136 - 145 mmol/L Final    Chloride 103  100 - 108 mmol/L Final    CO2 32  21 - 32 mmol/L Final    Anion Gap 6  4 - 13 mmol/L Final    BUN 21  5 - 25 mg/dL Final    Glucose 120  65 - 140 mg/dL Final    Comment:   If the patient is fasting, the ADA then defines impaired fasting glucose as > 100 mg/dL and diabetes as > or equal to 123 mg/dL  Specimen collection should occur prior to Sulfasalazine administration due to the potential for falsely depressed results  Specimen collection should occur prior to Sulfapyridine administration due to the potential for falsely elevated results  Calcium 9 7  8 3 - 10 1 mg/dL Final    AST 22  5 - 45 U/L Final    Comment:   Specimen collection should occur prior to Sulfasalazine administration due to the potential for falsely depressed results  ALT 24  12 - 78 U/L Final    Comment:   Specimen collection should occur prior to Sulfasalazine administration due to the potential for falsely depressed results       Alkaline Phosphatase 77  46 - 116 U/L Final    Total Protein 7 6  6 4 - 8 2 g/dL Final    Albumin 3 7  3 5 - 5 0 g/dL Final    Total Bilirubin 0 70  0 20 - 1 00 mg/dL Final eGFR 43  ml/min/1 73sq m Final    Narrative:     National Kidney Disease Education Program recommendations are as follows:  GFR calculation is accurate only with a steady state creatinine  Chronic Kidney disease less than 60 ml/min/1 73 sq  meters  Kidney failure less than 15 ml/min/1 73 sq  meters  D-DIMER, QUANTITATIVE - Abnormal     D-Dimer, Quant 799 (*) 0 - 424 ng/ml (FEU) Final    Comment:   Reference and upper limits to exclude DVT and PE are the same  Do not use to exclude if clinical symptoms are present  Pregnant women:  1st trimester:  <220 - 1060 ng/ml (FEU)  2nd trimester:  <220 - 1880 ng/ml (FEU)  3rd trimester:   238 - 3280 ng/ml (FEU)         TROPONIN I - Normal    Troponin I <0 02  <=0 04 ng/mL Final    Comment: 3Autovalidation override    Narrative:     [employer] Chemistry analyzer 99% cutoff is > 0 04 ng/mL in network labs    o cTnI 99% cutoff is useful only when applied to patients in the clinical setting of myocardial ischemia  o cTnI 99% cutoff should be interpreted in the context of clinical history, ECG findings and possibly cardiac imaging to establish correct diagnosis  o cTnI 99% cutoff may be suggestive but clearly not indicative of a coronary event without the clinical setting of myocardial ischemia  MAGNESIUM - Normal    Magnesium 1 6  1 6 - 2 6 mg/dL Final       CTA ED chest PE study   Final Result      No pulmonary arterial embolism or acute pulmonary findings  Dominant 10 mm left lower lobe nodule  Consider follow-up with PET scan if this has not been previously characterized  ##[email or web address]##[email or web address]                                 Workstation performed: [id number]         XR chest 2 views   ED Interpretation   No acute abnormality in the chest   No change from prior chest   x-ray on 8/2/2017            Procedures  ECG 12 Lead Documentation  Date/Time: 8/13/2017 6:56 PM  Performed by: David Jordan by: Mehran Angulo     ECG reviewed by me, the ED Provider: yes    Patient location:  ED  Previous ECG:     Previous ECG:  Compared to current    Comparison ECG info:  8-2-17    Similarity:  No change  Rate:     ECG rate:  76    ECG rate assessment: normal    Rhythm:     Rhythm: sinus rhythm    Ectopy:     Ectopy: none    QRS:     QRS axis:  Normal    QRS intervals:  Normal  Conduction:     Conduction: normal    ST segments:     ST segments:  Normal  T waves:     T waves: normal    Comments:      Low voltage QRS  Phone Contacts  ED Phone Contact    ED Course  ED Course   Nivia Montez's Documentation   Value Comment Time   D-DIMER QUANTITATIVE: (!) 799 Updated patient and will obtain CTA chest to rule out PE  Patient receiving IVF bolus which will help NADEEN  Patient agreeable to admission   08/13 2012         HEART Risk Score    Flowsheet Row Most Recent Value   History  1 Filed at: 08/13/2017 1916   ECG  0 Filed at: 08/13/2017 1916   Age  2 Filed at: 08/13/2017 1916   Risk Factors  1 Filed at: 08/13/2017 1916   Troponin  0 Filed at: 08/13/2017 1916   Heart Score Risk Calculator   History  1 Filed at: 08/13/2017 1916   ECG  0 Filed at: 08/13/2017 1916   Age  2 Filed at: 08/13/2017 1916   Risk Factors  1 Filed at: 08/13/2017 1916   Troponin  0 Filed at: 08/13/2017 1916   HEART Score  4 Filed at: 08/13/2017 1916   HEART Score  4 Filed at: 08/13/2017 1916            PERC Rule for PE    Flowsheet Row Most Recent Value   PERC Rule for PE   Age >=50  1 Filed at: 08/13/2017 1915   HR >=100  0 Filed at: 08/13/2017 1915   O2 Sat on room air < 95%  0 Filed at: 08/13/2017 1915   History of PE or DVT  1 Filed at: 08/13/2017 1915   Recent trauma or surgery  0 Filed at: 08/13/2017 1915   Hemoptysis  0 Filed at: 08/13/2017 1915   Exogenous estrogen  0 Filed at: 08/13/2017 1915   Unilateral leg swelling  0 Filed at: 08/13/2017 1915   PERC Rule for PE Results  2 Filed at: 08/13/2017 1915                      MDM  Number of Diagnoses or Management Options  Diagnosis management comments: Central and left-sided chest pain as well as shortness of breath  Differential includes acute coronary syndrome, pulmonary embolism, anxiety or musculoskeletal pain, GERD, pneumonia  Will do full cardiac workup and check a D-dimer  If D-dimer elevated, will obtain a CTA of the chest  HEART score of 4, so will likely recommend admission for obs/ACS rule out if work up in ED negative  Amount and/or Complexity of Data Reviewed  Clinical lab tests: ordered and reviewed  Tests in the radiology section of CPT®: ordered and reviewed  Tests in the medicine section of CPT®: ordered and reviewed  Independent visualization of images, tracings, or specimens: yes      CritCare Time    Disposition  Final diagnoses:   Chest tightness   Dyspnea   Lightheadedness   Hypokalemia   NADEEN (acute kidney injury)   Nodule of left lung     ED Disposition     ED Disposition Condition Comment    Admit  Case was discussed with KEV and the patient's admission status was agreed to be Admission Status: observation status to the service of Dr Shade aRmires   Follow-up Information    None       Patient's Medications   Discharge Prescriptions    No medications on file     No discharge procedures on file      ED Provider  Electronically Signed by       Miryam Kaufman DO  08/13/17 5871

## 2017-08-14 LAB
ANION GAP SERPL CALCULATED.3IONS-SCNC: 6 MMOL/L (ref 4–13)
APTT PPP: 28 SECONDS (ref 23–35)
ATRIAL RATE: 74 BPM
ATRIAL RATE: 76 BPM
ATRIAL RATE: 78 BPM
BUN SERPL-MCNC: 15 MG/DL (ref 5–25)
CALCIUM SERPL-MCNC: 8.7 MG/DL (ref 8.3–10.1)
CHLORIDE SERPL-SCNC: 104 MMOL/L (ref 100–108)
CHOLEST SERPL-MCNC: 187 MG/DL (ref 50–200)
CO2 SERPL-SCNC: 30 MMOL/L (ref 21–32)
CREAT SERPL-MCNC: 0.94 MG/DL (ref 0.6–1.3)
ERYTHROCYTE [DISTWIDTH] IN BLOOD BY AUTOMATED COUNT: 14.8 % (ref 11.6–15.1)
GFR SERPL CREATININE-BSD FRML MDRD: 70 ML/MIN/1.73SQ M
GLUCOSE P FAST SERPL-MCNC: 106 MG/DL (ref 65–99)
GLUCOSE SERPL-MCNC: 106 MG/DL (ref 65–140)
HCT VFR BLD AUTO: 36.7 % (ref 34.8–46.1)
HDLC SERPL-MCNC: 67 MG/DL (ref 40–60)
HGB BLD-MCNC: 11.7 G/DL (ref 11.5–15.4)
INR PPP: 0.96 (ref 0.86–1.16)
LDLC SERPL CALC-MCNC: 110 MG/DL (ref 0–100)
MCH RBC QN AUTO: 28.6 PG (ref 26.8–34.3)
MCHC RBC AUTO-ENTMCNC: 31.9 G/DL (ref 31.4–37.4)
MCV RBC AUTO: 90 FL (ref 82–98)
P AXIS: 48 DEGREES
P AXIS: 52 DEGREES
P AXIS: 58 DEGREES
PLATELET # BLD AUTO: 166 THOUSANDS/UL (ref 149–390)
PMV BLD AUTO: 12.5 FL (ref 8.9–12.7)
POTASSIUM SERPL-SCNC: 3.1 MMOL/L (ref 3.5–5.3)
PR INTERVAL: 166 MS
PR INTERVAL: 186 MS
PR INTERVAL: 192 MS
PROTHROMBIN TIME: 13 SECONDS (ref 12.1–14.4)
QRS AXIS: -10 DEGREES
QRS AXIS: -19 DEGREES
QRS AXIS: -5 DEGREES
QRSD INTERVAL: 82 MS
QRSD INTERVAL: 84 MS
QRSD INTERVAL: 86 MS
QT INTERVAL: 306 MS
QT INTERVAL: 354 MS
QT INTERVAL: 362 MS
QTC INTERVAL: 339 MS
QTC INTERVAL: 398 MS
QTC INTERVAL: 412 MS
RBC # BLD AUTO: 4.09 MILLION/UL (ref 3.81–5.12)
SODIUM SERPL-SCNC: 140 MMOL/L (ref 136–145)
T WAVE AXIS: -10 DEGREES
T WAVE AXIS: 24 DEGREES
T WAVE AXIS: 27 DEGREES
TRIGL SERPL-MCNC: 50 MG/DL
TROPONIN I SERPL-MCNC: <0.02 NG/ML
TROPONIN I SERPL-MCNC: <0.02 NG/ML
VENTRICULAR RATE: 74 BPM
VENTRICULAR RATE: 76 BPM
VENTRICULAR RATE: 78 BPM
WBC # BLD AUTO: 4.9 THOUSAND/UL (ref 4.31–10.16)

## 2017-08-14 PROCEDURE — 80048 BASIC METABOLIC PNL TOTAL CA: CPT | Performed by: PHYSICIAN ASSISTANT

## 2017-08-14 PROCEDURE — 84484 ASSAY OF TROPONIN QUANT: CPT | Performed by: PHYSICIAN ASSISTANT

## 2017-08-14 PROCEDURE — 93005 ELECTROCARDIOGRAM TRACING: CPT | Performed by: PHYSICIAN ASSISTANT

## 2017-08-14 PROCEDURE — 85027 COMPLETE CBC AUTOMATED: CPT | Performed by: PHYSICIAN ASSISTANT

## 2017-08-14 PROCEDURE — 80061 LIPID PANEL: CPT | Performed by: PHYSICIAN ASSISTANT

## 2017-08-14 PROCEDURE — 85610 PROTHROMBIN TIME: CPT | Performed by: PHYSICIAN ASSISTANT

## 2017-08-14 PROCEDURE — 85730 THROMBOPLASTIN TIME PARTIAL: CPT | Performed by: PHYSICIAN ASSISTANT

## 2017-08-14 RX ORDER — CARVEDILOL 12.5 MG/1
12.5 TABLET ORAL 2 TIMES DAILY WITH MEALS
Status: DISCONTINUED | OUTPATIENT
Start: 2017-08-14 | End: 2017-08-14

## 2017-08-14 RX ORDER — CARVEDILOL 12.5 MG/1
12.5 TABLET ORAL 2 TIMES DAILY WITH MEALS
Status: DISCONTINUED | OUTPATIENT
Start: 2017-08-14 | End: 2017-08-15

## 2017-08-14 RX ORDER — POTASSIUM CHLORIDE 20 MEQ/1
40 TABLET, EXTENDED RELEASE ORAL ONCE
Status: COMPLETED | OUTPATIENT
Start: 2017-08-14 | End: 2017-08-14

## 2017-08-14 RX ADMIN — ENOXAPARIN SODIUM 40 MG: 40 INJECTION SUBCUTANEOUS at 09:11

## 2017-08-14 RX ADMIN — PANTOPRAZOLE SODIUM 40 MG: 40 TABLET, DELAYED RELEASE ORAL at 05:44

## 2017-08-14 RX ADMIN — ATORVASTATIN CALCIUM 40 MG: 40 TABLET, FILM COATED ORAL at 17:07

## 2017-08-14 RX ADMIN — POTASSIUM CHLORIDE 20 MEQ: 1500 TABLET, EXTENDED RELEASE ORAL at 17:07

## 2017-08-14 RX ADMIN — ASPIRIN 81 MG 81 MG: 81 TABLET ORAL at 09:11

## 2017-08-14 RX ADMIN — Medication 400 MG: at 09:11

## 2017-08-14 RX ADMIN — SODIUM CHLORIDE 100 ML/HR: 0.9 INJECTION, SOLUTION INTRAVENOUS at 07:45

## 2017-08-14 RX ADMIN — CARVEDILOL 12.5 MG: 12.5 TABLET, FILM COATED ORAL at 09:42

## 2017-08-14 RX ADMIN — CARVEDILOL 12.5 MG: 12.5 TABLET, FILM COATED ORAL at 17:07

## 2017-08-14 RX ADMIN — POTASSIUM CHLORIDE 40 MEQ: 1500 TABLET, EXTENDED RELEASE ORAL at 11:43

## 2017-08-14 RX ADMIN — POTASSIUM CHLORIDE 20 MEQ: 1500 TABLET, EXTENDED RELEASE ORAL at 09:11

## 2017-08-14 NOTE — PLAN OF CARE
CARDIOVASCULAR - ADULT     Maintains optimal cardiac output and hemodynamic stability Progressing     Absence of cardiac dysrhythmias or at baseline rhythm Progressing        DISCHARGE PLANNING     Discharge to home or other facility with appropriate resources Progressing        DISCHARGE PLANNING - CARE MANAGEMENT     Discharge to post-acute care or home with appropriate resources Progressing        Knowledge Deficit     Patient/family/caregiver demonstrates understanding of disease process, treatment plan, medications, and discharge instructions Progressing        PAIN - ADULT     Verbalizes/displays adequate comfort level or baseline comfort level Progressing        SAFETY ADULT     Patient will remain free of falls Progressing     Maintain or return to baseline ADL function Progressing     Maintain or return mobility status to optimal level Progressing

## 2017-08-14 NOTE — CONSULTS
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 is a 66y o  year old female who has a history of hypertension, GERD  Patient presented to [facility] Emergency Room on 8/13/2017 in the afternoon with complaints of chest discomfort for the last 1 day  She also had shortness of breath but she states this is chronic for her  She also admits to dizziness, lightheadedness, vomiting  She was recently in the hospital discharge on August 5th for similar complaints  She was sent home  She states the dizziness was likely attributed to her elevated blood pressure  At her previous admission her metoprolol was changed to Coreg  She had a negative echocardiogram at that time, CT scan  Patient states the chest pain is new for her and she did not have that at her last admission  Patient has no personal history of CAD      REVIEW OF SYSTEMS:  Constitutional:  Denies fever or chills   Eyes:  Denies change in visual acuity   HENT:  Denies nasal congestion or sore throat   Respiratory:  + shortness of breath Denies cough   Cardiovascular:  + chest pain Denies edema   GI:  + nausea, vomiting Denies abdominal pain, bloody stools or diarrhea   :  Denies dysuria, frequency, difficulty in micturition and nocturia  Musculoskeletal:  Denies back pain or joint pain   Neurologic:  + dizziness, lightheadedness Denies headache, focal weakness or sensory changes   Endocrine:  Denies polyuria or polydipsia   Lymphatic:  Denies swollen glands   Psychiatric:  Denies depression or anxiety     Historical Information   Past Medical History:   Diagnosis Date    Hypertension      Past Surgical History:   Procedure Laterality Date    BREAST CYST EXCISION       History   Alcohol Use    Yes     Comment: Socially     History   Drug Use No     History   Smoking Status    Never Smoker   Smokeless Tobacco    Not on file     Family History: History reviewed  No pertinent family history  MEDS & ALLERGIES:  all current active meds have been reviewed and current meds:   Current Facility-Administered Medications   Medication Dose Route Frequency    acetaminophen (TYLENOL) tablet 650 mg  650 mg Oral Q6H PRN    aspirin chewable tablet 81 mg  81 mg Oral Daily    atorvastatin (LIPITOR) tablet 40 mg  40 mg Oral QPM    calcium carbonate (TUMS) chewable tablet 1,000 mg  1,000 mg Oral Daily PRN    carvedilol (COREG) tablet 25 mg  25 mg Oral BID With Meals    enoxaparin (LOVENOX) subcutaneous injection 40 mg  40 mg Subcutaneous Daily    hydrALAZINE (APRESOLINE) injection 5 mg  5 mg Intravenous Q4H PRN    magnesium oxide (MAG-OX) tablet 400 mg  400 mg Oral Daily    nitroglycerin (NITROSTAT) SL tablet 0 4 mg  0 4 mg Sublingual Q5 Min PRN    ondansetron (ZOFRAN) injection 4 mg  4 mg Intravenous Q6H PRN    pantoprazole (PROTONIX) EC tablet 40 mg  40 mg Oral Early Morning    potassium chloride (K-DUR,KLOR-CON) CR tablet 20 mEq  20 mEq Oral BID    sodium chloride 0 9 % infusion  100 mL/hr Intravenous Continuous       sodium chloride 100 mL/hr Last Rate: 100 mL/hr (08/14/17 0745)     No Known Allergies    OBJECTIVE:  Vitals:   Vitals:    08/14/17 0700   BP: 130/78   Pulse: 74   Resp: 18   Temp: (!) 97 4 °F (36 3 °C)   SpO2: 99%     Body mass index is 35 38 kg/m²      Systolic (89JQL), ZQT:435 , Min:130 , MYP:468     Diastolic (02OPF), IYV:17, Min:72, Max:98      Intake/Output Summary (Last 24 hours) at 08/14/17 0841  Last data filed at 08/14/17 0550   Gross per 24 hour   Intake               50 ml   Output              900 ml   Net             -850 ml     Weight (last 2 days)     Date/Time   Weight    08/13/17 2201  93 5 (206 13) 08/13/17 1833  93 5 (206 13)            Invasive Devices     Peripheral Intravenous Line            Peripheral IV 08/13/17 Right Antecubital less than 1 day                PHYSICAL EXAMS:  General:  Patient is not in acute distress, laying in the bed comfortably, awake, alert responding to commands  Head: Normocephalic, Atraumatic  HEENT:  Both pupils normal-size atraumatic, normocephalic, nonicteric  Neck:  JVP not raised  Trachea central  Respiratory:  Bronchovascular breathing all over the chest without any accompaniment  Cardiovascular:  S1-S2 normal  RRR without any murmur or rub  GI:  Abdomen soft nontender   Liver and spleen normal size  Musculoskeletal:  No clubbing, cyanosis or edema  Integument:  No skin rashes or ulceration  Lymphatic:  No cervical or inguinal lymphadenopathy  Neurologic:  Patient is awake alert, responding to command, well-oriented to time and place and person    LABORATORY RESULTS:    Results from last 7 days  Lab Units 08/14/17  0515 08/14/17 0157 08/13/17 2238   TROPONIN I ng/mL <0 02 <0 02 <0 02     CBC with diff:   Results from last 7 days  Lab Units 08/14/17 0515 08/13/17 2238 08/13/17  1911   WBC Thousand/uL 4 90  --  5 05   HEMOGLOBIN g/dL 11 7  --  12 8   HEMATOCRIT % 36 7  --  39 7   MCV fL 90  --  90   PLATELETS Thousands/uL 166 174 170   MCH pg 28 6  --  29 2   MCHC g/dL 31 9  --  32 2   RDW % 14 8  --  14 9   MPV fL 12 5 12 8* 13 0*   NRBC AUTO /100 WBCs  --   --  0       CMP:  Results from last 7 days  Lab Units 08/14/17  0515 08/13/17  1911   SODIUM mmol/L 140 141   POTASSIUM mmol/L 3 1* 3 3*   CHLORIDE mmol/L 104 103   CO2 mmol/L 30 32   ANION GAP mmol/L 6 6   BUN mg/dL 15 21   CREATININE mg/dL 0 94 1 41*   GLUCOSE RANDOM mg/dL 106 120   CALCIUM mg/dL 8 7 9 7   AST U/L  --  22   ALT U/L  --  24   ALK PHOS U/L  --  77   TOTAL PROTEIN g/dL  --  7 6   ALBUMIN g/dL  --  3 7   BILIRUBIN TOTAL mg/dL  --  0 70   EGFR ml/min/1 73sq m 70 43       BMP:  Results from last 7 days  Lab Units 17  0515 17  1911   SODIUM mmol/L 140 141   POTASSIUM mmol/L 3 1* 3 3*   CHLORIDE mmol/L 104 103   CO2 mmol/L 30 32   BUN mg/dL 15 21   CREATININE mg/dL 0 94 1 41*   GLUCOSE RANDOM mg/dL 106 120   CALCIUM mg/dL 8 7 9 7         Results from last 7 days  Lab Units 17  2238   NT-PRO BNP pg/mL 49        Results from last 7 days  Lab Units 17  1911   MAGNESIUM mg/dL 1 6               Results from last 7 days  Lab Units 17  0515   INR  0 96       Lipid Profile:   Lab Results   Component Value Date    CHOL 187 2017     Lab Results   Component Value Date    HDL 67 (H) 2017     Lab Results   Component Value Date    LDLCALC 110 (H) 2017     Lab Results   Component Value Date    TRIG 50 2017       Cardiac testing:   Results for orders placed during the hospital encounter of 17   Echo complete with contrast if indicated    Narrative [address] [place] [address]  [phone]    Transthoracic Echocardiogram  2D, M-mode, Doppler, and Color Doppler    Study date:  03-Aug-2017    Patient: Marielena Albrecht  MR number: [id number]  Account number: [de-identified]  : 1945  Age: 66 years  Gender: Female  Status: Outpatient  Location: Bedside  Height: 64 in  Weight: 205 lb  BP: 116/ 62 mmHg    Indications: Hypertensive heart disease, Assess left ventricular function  Diagnoses: I11 9 - Hypertensive heart disease without heart failure    Sonographer:   Elton Bryant RDCS  Interpreting Physician:  Jerardo Hubbard MD  Referring Physician:  Gabby Tavarez PA-C  Group:  [facility]    SUMMARY    PROCEDURE INFORMATION:  This was a technically difficult study  Echocardiographic views were limited due to poor acoustic window availability, decreased penetration, and lung interference  LEFT VENTRICLE:  Systolic function was normal  Ejection fraction was estimated to be 60 %    There were no regional wall motion abnormalities  Doppler parameters were consistent with abnormal left ventricular relaxation (grade 1 diastolic dysfunction)  RIGHT VENTRICLE:  The size was normal   Systolic function was normal     TRICUSPID VALVE:  There was mild regurgitation  Pulmonary artery systolic pressure was within the normal range  HISTORY: Dizziness  PRIOR HISTORY: Risk factors: hypertension and morbid obesity  PROCEDURE: The procedure was performed at the bedside  This was a routine study  The transthoracic approach was used  The study included complete 2D imaging, M-mode, complete spectral Doppler, and color Doppler  The heart rate was 63 bpm,  at the start of the study  Images were obtained from the parasternal, apical, and subcostal acoustic windows  Images were not obtained from the suprasternal notch acoustic windows  Echocardiographic views were limited due to poor acoustic  window availability, decreased penetration, and lung interference  This was a technically difficult study  LEFT VENTRICLE: Size was normal  Systolic function was normal  Ejection fraction was estimated to be 60 %  There were no regional wall motion abnormalities  Wall thickness was normal  No evidence of apical thrombus  DOPPLER: Doppler  parameters were consistent with abnormal left ventricular relaxation (grade 1 diastolic dysfunction)  RIGHT VENTRICLE: The size was normal  Systolic function was normal  Wall thickness was normal     LEFT ATRIUM: Size was normal     RIGHT ATRIUM: Size was normal     MITRAL VALVE: Valve structure was normal  There was normal leaflet separation  DOPPLER: The transmitral velocity was within the normal range  There was no evidence for stenosis  There was no significant regurgitation  AORTIC VALVE: The valve was trileaflet  Leaflets exhibited mildly increased thickness and normal cuspal separation  DOPPLER: Transaortic velocity was within the normal range   There was no evidence for stenosis  There was no significant  regurgitation  TRICUSPID VALVE: The valve structure was normal  There was normal leaflet separation  DOPPLER: The transtricuspid velocity was within the normal range  There was no evidence for stenosis  There was mild regurgitation  Pulmonary artery  systolic pressure was within the normal range  PULMONIC VALVE: Leaflets exhibited normal thickness, no calcification, and normal cuspal separation  DOPPLER: The transpulmonic velocity was within the normal range  There was no significant regurgitation  PERICARDIUM: There was no pericardial effusion  The pericardium was normal in appearance  AORTA: The root exhibited normal size  SYSTEMIC VEINS: IVC: The inferior vena cava was normal in size  Respirophasic changes were normal     SYSTEM MEASUREMENT TABLES    2D  %FS: 33 6 %  Ao Diam: 2 6 cm  EDV(Teich): 92 8 ml  EF(Teich): 62 5 %  ESV(Teich): 34 8 ml  IVSd: 0 9 cm  LA Area: 13 4 cm2  LA Diam: 3 1 cm  LVEDV MOD A4C: 105 3 ml  LVEF MOD A4C: 72 2 %  LVESV MOD A4C: 29 2 ml  LVIDd: 4 5 cm  LVIDs: 3 cm  LVLd A4C: 7 3 cm  LVLs A4C: 6 cm  LVPWd: 0 8 cm  RA Area: 12 6 cm2  RVIDd: 3 9 cm  SV MOD A4C: 76 ml  SV(Teich): 58 ml    CW  TR Vmax: 2 8 m/s  TR maxP 8 mmHg    MM  TAPSE: 2 4 cm    PW  E': 0 1 m/s  E/E': 13 7  MV A Darrell: 1 m/s  MV Dec Simpson: 2 4 m/s2  MV DecT: 335 1 ms  MV E Darrell: 0 8 m/s  MV E/A Ratio: 0 8  MV PHT: 97 2 ms  MVA By PHT: 2 3 cm2    [employer] Accredited Echocardiography Laboratory    Prepared and electronically signed by    Musa Latham MD  Signed 03-Aug-2017 17:40:08         Imaging: I have personally reviewed pertinent reports  EKG reviewed personally:   SR    Telemetry reviewed personally:   SR    Assessment/Plan:  1  Chest pain; atypical -- trop 0 02x3 -- check Lexiscan stress test to assess for ischemia  Pt had echo on last admission 8/3/17 -- EF 60%, mild TR, no need to repeat  Continue all meds -- ASA, BB, lipitor      2  HTN -- latest /78  continue all meds, coreg 12 5mg bid  3  Dizziness -- monitor vitals  4  Hypokalemia -- Potassium 3 1, continue with replacement        Code Status: Level 1 - Full Code    Thank you for allowing us to participate in this patient's care  This pt will follow up with Dr Brittani Laureano once discharged  Counseling / Coordination of Care  Total floor / unit time spent today 35 minutes  Greater than 50% of total time was spent with the patient and / or family counseling and / or coordination of care  A description of the counseling / coordination of care: Review of history, current assessment, development of a plan  David Lawrence PA-C  3/73/4476,0:35 AM    Portions of the record may have been created with voice recognition software   Occasional wrong word or "sound a like" substitutions may have occurred due to the inherent limitations of voice recognition software   Read the chart carefully and recognize, using context, where substitutions have occurred

## 2017-08-14 NOTE — PROGRESS NOTES
[facility] Progress Note  Patient: Valdez Jean 66 y o  female   MRN: [id number]  PCP: Sharif Orourke MD  Unit/Bed#: -Ara Encounter: [id number]  Date Of Visit: 17    Assessment:    Principal Problem:    Chest pain  Active Problems:    Dizziness    Hypokalemia    Hypertension    Nodule of left lung    NADEEN (acute kidney injury)    Positive D dimer      Plan:    1  Chest pain, recent admission for similar reasons of dizziness recently discharged , cardiology evaluated plan at this time is for cardiac stress test tomorrow  2  Continue with the rest of the medications as ordered  3  Patient has now been started on Coreg dose of which was decreased, the patient feels that she has some neck swelling since then no respiratory distress, cardiology decrease the dose to 12 5 b i d   4  Acute kidney injury likely prerenal improved with fluids back to baseline DC fluids  5  Uncontrolled hypertension, DC fluids as for the meds reviewed and will continue  6  Hypokalemia, replete and monitor  7  Lung nodules on the CTA, outpatient follow-up, discussed    VTE Pharmacologic Prophylaxis:   Pharmacologic: Enoxaparin (Lovenox)  Mechanical VTE Prophylaxis in Place: Yes    Patient Centered Rounds: I have performed bedside rounds with nursing staff today  Discussions with Specialists or Other Care Team Provider: y  Education and Discussions with Family / Patient: y    Time Spent for Care: 20 minutes  More than 50% of total time spent on counseling and coordination of care as described above      Current Length of Stay: 0 day(s)    Current Patient Status: Inpatient   Certification Statement: The patient will continue to require additional inpatient hospital stay due to chest pain        Code Status: Level 1 - Full Code      Subjective:   Chest pain yesterday and dizziness, asymptomatic today  Objective:     Vitals:   Temp (24hrs), Av 9 °F (36 6 °C), Min:97 4 °F (36 3 °C), Max:98 7 °F (37 1 °C)    HR:  [63-85] 63  Resp:  [18-30] 18  BP: (130-208)/(72-98) 158/79  SpO2:  [96 %-99 %] 98 %  Body mass index is 35 38 kg/m²  Input and Output Summary (last 24 hours): Intake/Output Summary (Last 24 hours) at 08/14/17 1258  Last data filed at 08/14/17 0941   Gross per 24 hour   Intake               50 ml   Output             1200 ml   Net            -1150 ml       Physical Exam:     Physical Exam   Constitutional: She is oriented to person, place, and time  No distress  HENT:   Head: Normocephalic  Eyes: Pupils are equal, round, and reactive to light  Neck: No tracheal deviation present  Cardiovascular: Normal rate  No murmur heard  Pulmonary/Chest: No respiratory distress  Abdominal: She exhibits no distension  Musculoskeletal: She exhibits no edema  Neurological: She is alert and oriented to person, place, and time  Skin: She is not diaphoretic  Additional Data:     Labs:      Results from last 7 days  Lab Units 08/14/17  0515  08/13/17  1911   WBC Thousand/uL 4 90  --  5 05   HEMOGLOBIN g/dL 11 7  --  12 8   HEMATOCRIT % 36 7  --  39 7   PLATELETS Thousands/uL 166  < > 170   NEUTROS PCT %  --   --  51   LYMPHS PCT %  --   --  35   MONOS PCT %  --   --  11   EOS PCT %  --   --  3   < > = values in this interval not displayed  Results from last 7 days  Lab Units 08/14/17  0515 08/13/17  1911   SODIUM mmol/L 140 141   POTASSIUM mmol/L 3 1* 3 3*   CHLORIDE mmol/L 104 103   CO2 mmol/L 30 32   BUN mg/dL 15 21   CREATININE mg/dL 0 94 1 41*   CALCIUM mg/dL 8 7 9 7   TOTAL PROTEIN g/dL  --  7 6   BILIRUBIN TOTAL mg/dL  --  0 70   ALK PHOS U/L  --  77   ALT U/L  --  24   AST U/L  --  22   GLUCOSE RANDOM mg/dL 106 120       Results from last 7 days  Lab Units 08/14/17  0515   INR  0 96       * I Have Reviewed All Lab Data Listed Above  * Additional Pertinent Lab Tests Reviewed:  [facility] 64 Admission Reviewed    Imaging:      Recent Cultures (last 7 days): Last 24 Hours Medication List:     aspirin 81 mg Oral Daily   atorvastatin 40 mg Oral QPM   carvedilol 12 5 mg Oral BID With Meals   enoxaparin 40 mg Subcutaneous Daily   magnesium oxide 400 mg Oral Daily   pantoprazole 40 mg Oral Early Morning   potassium chloride 20 mEq Oral BID        Today, Patient Was Seen By: Aleta Ponce MD    ** Please Note: Dragon 360 Dictation voice to text software may have been used in the creation of this document   **

## 2017-08-14 NOTE — CASE MANAGEMENT
[address] [place] [address] in the [facility] for 2017  Network Utilization Review Department  Phone: [phone]; Fax [phone]  ATTENTION: The Network Utilization Review Department is now centralized for our 7 Facilities  Make a note that we have a new phone and fax numbers for our Department  Please call with any questions or concerns to [phone] and carefully follow the prompts so that you are directed to the right person  All voicemails are confidential  Fax any determinations, approvals, denials, and requests for initial or continue stay review clinical to [phone]  **Due to HIGH CALL volume, it would be easier if you could please send faxed requests  This will expedite your requests and in part, help us provide discharge notifications faster  **  Initial Clinical Review    Admission: Date/Time/Statement: 8/13 2020    Orders Placed This Encounter   Procedures    Place in Observation (expected length of stay for this patient is less than two midnights)     Standing Status:   Standing     Number of Occurrences:   1     Order Specific Question:   Admitting Physician     Answer:   Vaibhav Cisneros     Order Specific Question:   Level of Care     Answer:   Med Surg [16]     Order Specific Question:   Bed request comments     Answer:   tele         ED: Date/Time/Mode of Arrival:   ED Arrival Information     Expected Arrival [address] of Arrival Escorted By Service Admission Type    - 8/13/2017 18:29 Emergent Walk-In Family Member General Medicine Emergency    Arrival Complaint    CHEST PAIN          Chief Complaint:   Chief Complaint   Patient presents with    Chest Pain     Pt c/o CP (tightness) and SOB since this morning         History of Illness: Elvia Hancock is a 66 y o  female who presents with PMHx of HTN and GERD presenting with chest discomfort x one day with associated SOB however patient admits to chronic SOB and that this is not worse than her normal   Admits to dizziness with lightheadedness and vomiting  Patient was recently d/c here on 8/5 for the same exact complaints was d/c home  Dizziness at that time was presumed to be likely to HTN  Her metoprolol was changed to carvedilol, had ECHO, negative trops, and negative CT scan  PCP cut carvedilol in half yesterday and since then has had worsening dizziness  States that this chest discomfort is new in nature with no aggravating or relieving factors  Denies any other systemic sx at this time  ED Vital Signs:   ED Triage Vitals   Temperature Pulse Respirations Blood Pressure SpO2   08/13/17 1839 08/13/17 1835 08/13/17 1835 08/13/17 1833 08/13/17 1833   98 7 °F (37 1 °C) 85 18 (!) 208/98 97 %      Temp Source Heart Rate Source Patient Position BP Location FiO2 (%)   08/13/17 1839 08/13/17 1918 08/13/17 1833 08/13/17 1833 --   Oral Monitor Lying Right arm       Pain Score       08/13/17 1835       No Pain        Wt Readings from Last 1 Encounters:   08/13/17 93 5 kg (206 lb 2 1 oz)       Vital Signs (abnormal):   08/13/17 2135 -- 63 20  181/97 99 % -- RM   08/13/17 2040 -- 69  30 170/82 99 % -- LK   08/13/17 1918 -- 75  24  193/91 98 %       Abnormal Labs/Diagnostic Test Results: d-dimer  799, K  3 3, BUN creat   21 1 41  CT chest - wnl   CXR -      Nodular density left base level of diaphragm         EKG- NSR     ED Treatment:   Medication Administration from 08/13/2017 1829 to 08/13/2017 2152       Date/Time Order Dose Route Action Action by Comments     08/13/2017 2039 sodium chloride 0 9 % bolus 1,000 mL 0 mL Intravenous Stopped Daniel Sy RN      08/13/2017 1912 sodium chloride 0 9 % bolus 1,000 mL 1,000 mL Intravenous New Bag Daniel Sy RN      08/13/2017 1916 aspirin chewable tablet 243 mg 243 mg Oral Given Daniel Sy RN      08/13/2017 1917 nitroglycerin (NITROSTAT) SL tablet 0 4 mg 0 4 mg Sublingual Given Daniel Sy RN      08/13/2017 2055 potassium chloride (K-DUR,KLOR-CON) CR tablet 40 mEq 40 mEq Oral Given Mckenzie Anderson RN Kcl 3 3     08/13/2017 2048 magnesium sulfate 2 g/50 mL IVPB (premix) 2 g 2 g Intravenous New Bag Mckenzie Anderson RN      08/13/2017 2004 iodixanol (VISIPAQUE) 320 MG/ML injection 85 mL 85 mL Intravenous Given Gilma Sibley           Past Medical/Surgical History: Active Ambulatory Problems     Diagnosis Date Noted    Dizziness 08/02/2017    Hypokalemia 08/02/2017    Hypertension 08/05/2017     Resolved Ambulatory Problems     Diagnosis Date Noted    Hypertensive crisis 08/02/2017    Thrombocytopenia 08/02/2017     Past Medical History:   Diagnosis Date    Hypertension        Admitting Diagnosis: Hypokalemia [E87 6]  Lightheadedness [R42]  Chest tightness [R07 89]  Chest pain [R07 9]  Dyspnea [R06 00]  Nodule of left lung [R91 1]  NADEEN (acute kidney injury) [N17 9]    Age/Sex: 66 y o  female    Assessment/Plan:    Hospital Problem List:      Principal Problem:    Chest pain  Active Problems:    Dizziness    Hypokalemia    Hypertension    Nodule of left lung    NADEEN (acute kidney injury)    Positive D dimer        Plan for the Primary Problem(s):  · Chest pain  ? Trend troponin, telemetry monitoring, cards consult, cardiac diet, lipid panel, nitro PRN for pain, ASA, statin, BNP  ? CTA chest at this time was negative for PE or acute pulmonary findings     Plan for Additional Problems:   · Dizziness  ? Recently discharged on 8/5 for similar complaints of dizziness with HTN  ? Patient was seen by cardiology at that time, had CT head which showed microangiopathic changes, ECHO of EF of 60%  ? Cards iniatied coreg instead of home metoprolol  ? We will place patient on fall precautions at this time, up with assistance, re-consult cards  · Hypokalemia  ? K replaced in ED, will monitor in am, will order mag level  · HTN  ? Home medications, PRN hydralazine  · Nodule of lung  ? Outpatient PET  · NADEEN  ?  IVF hydration, monitor Cr, hold nephrotoxic meds  · Positive dimer  ? Negative CTA     VTE Prophylaxis: Enoxaparin (Lovenox)  / sequential compression device   Code Status: full  POLST: POLST form is not discussed and not completed at this time      Anticipated Length of Stay:  Patient will be admitted on an Observation basis with an anticipated length of stay of  < 2 midnights  Justification for Hospital Stay: cards consult, trop trend       Admission Orders:  Scheduled Meds:   aspirin 81 mg Oral Daily   atorvastatin 40 mg Oral QPM   carvedilol 12 5 mg Oral BID With Meals   enoxaparin 40 mg Subcutaneous Daily   magnesium oxide 400 mg Oral Daily   pantoprazole 40 mg Oral Early Morning   potassium chloride 20 mEq Oral BID     Continuous Infusions:   sodium chloride 100 mL/hr Last Rate: 100 mL/hr (08/14/17 0745)     PRN Meds:   acetaminophen    calcium carbonate    hydrALAZINE    nitroglycerin    ondansetron    x1    Cardiac diet   Fall precautions  Up w/ assist   Inc spirom   ekg prn cp   Stress test   SCD  Tele   8/14 lipid profile, cbc, bmp   Serial trop     hdl   67, ldl  110, K  3 1    Cardiology consult   8/14  Assessment/Plan:  1  Chest pain; atypical -- trop 0 02x3 -- check Lexiscan stress test to assess for ischemia  Pt had echo on last admission 8/3/17 -- EF 60%, mild TR, no need to repeat  Continue all meds -- ASA, BB, lipitor      2  HTN -- latest /78  continue all meds, coreg 12 5mg bid       3  Dizziness -- monitor vitals       4   Hypokalemia -- Potassium 3 1, continue with replacement

## 2017-08-14 NOTE — PLAN OF CARE
CARDIOVASCULAR - ADULT     Maintains optimal cardiac output and hemodynamic stability Progressing     Absence of cardiac dysrhythmias or at baseline rhythm Progressing        Knowledge Deficit     Patient/family/caregiver demonstrates understanding of disease process, treatment plan, medications, and discharge instructions Progressing        PAIN - ADULT     Verbalizes/displays adequate comfort level or baseline comfort level Progressing        SAFETY ADULT     Patient will remain free of falls Progressing     Maintain or return to baseline ADL function Progressing     Maintain or return mobility status to optimal level Progressing

## 2017-08-14 NOTE — ED NOTES
Patient transported to [address] [place] [address], [address] [place] [address]  08/13/17 2022

## 2017-08-14 NOTE — H&P
History and Physical - [facility]    Patient Information: Hector Ley 66 y o  female MRN: [id number]  Unit/Bed#: -01 Encounter: [id number]  Admitting Physician: Jaymie Guzman PA-C  PCP: Laya Wray MD  Date of Admission:  08/13/17    Assessment/Plan:    Hospital Problem List:     Principal Problem:    Chest pain  Active Problems:    Dizziness    Hypokalemia    Hypertension    Nodule of left lung    NADEEN (acute kidney injury)    Positive D dimer      Plan for the Primary Problem(s):  · Chest pain  · Trend troponin, telemetry monitoring, cards consult, cardiac diet, lipid panel, nitro PRN for pain, ASA, statin, BNP  · CTA chest at this time was negative for PE or acute pulmonary findings    Plan for Additional Problems:   · Dizziness  · Recently discharged on 8/5 for similar complaints of dizziness with HTN  · Patient was seen by cardiology at that time, had CT head which showed microangiopathic changes, ECHO of EF of 60%  · Cards iniatied coreg instead of home metoprolol  · We will place patient on fall precautions at this time, up with assistance, re-consult cards  · Hypokalemia  · K replaced in ED, will monitor in am, will order mag level  · HTN  · Home medications, PRN hydralazine  · Nodule of lung  · Outpatient PET  · NADEEN  · IVF hydration, monitor Cr, hold nephrotoxic meds  · Positive dimer  · Negative CTA    VTE Prophylaxis: Enoxaparin (Lovenox)  / sequential compression device   Code Status: full  POLST: POLST form is not discussed and not completed at this time  Anticipated Length of Stay:  Patient will be admitted on an Observation basis with an anticipated length of stay of  < 2 midnights  Justification for Hospital Stay: cards consult, trop trend    Total Time for Visit, including Counseling / Coordination of Care: 1 hour  Greater than 50% of this total time spent on direct patient counseling and coordination of care      Chief Complaint:   Chest discomfort and dizziness    History of Present Illness:    Moe Vera is a 66 y o  female who presents with PMHx of HTN and GERD presenting with chest discomfort x one day with associated SOB however patient admits to chronic SOB and that this is not worse than her normal   Admits to dizziness with lightheadedness and vomiting  Patient was recently d/c here on 8/5 for the same exact complaints was d/c home  Dizziness at that time was presumed to be likely to HTN  Her metoprolol was changed to carvedilol, had ECHO, negative trops, and negative CT scan  PCP cut carvedilol in half yesterday and since then has had worsening dizziness  States that this chest discomfort is new in nature with no aggravating or relieving factors  Denies any other systemic sx at this time  Review of Systems:    Review of Systems   Constitutional: Negative for chills, fatigue and fever  HENT: Negative for congestion  Eyes: Negative for photophobia and visual disturbance  Respiratory: Positive for chest tightness and shortness of breath  Negative for cough and wheezing  Cardiovascular: Positive for chest pain  Negative for palpitations and leg swelling  Gastrointestinal: Positive for nausea and vomiting  Negative for abdominal distention, abdominal pain, constipation and diarrhea  Genitourinary: Negative for dysuria  Musculoskeletal: Negative for back pain and gait problem  Skin: Negative for color change  Neurological: Positive for dizziness and light-headedness  Negative for syncope, facial asymmetry, weakness, numbness and headaches  Psychiatric/Behavioral: Negative for confusion  Past Medical and Surgical History:     Past Medical History:   Diagnosis Date    Hypertension        Past Surgical History:   Procedure Laterality Date    BREAST CYST EXCISION         Meds/Allergies:    Prior to Admission medications    Medication Sig Start Date End Date Taking?  Authorizing Provider   aspirin 81 mg chewable tablet Chew 81 mg daily 9/20/12   Historical Provider, MD   carvedilol (COREG) 25 mg tablet Take 1 tablet by mouth 2 (two) times a day with meals 8/5/17   Casey Johnson MD   chlorthalidone 25 mg tablet Take 1 tablet by mouth daily 8/5/17   Casey Johnson MD   magnesium oxide (MAG-OX) 400 mg Take 1 tablet by mouth daily for 5 days 8/5/17 8/10/17  Casey Johnson MD   omeprazole (PriLOSEC) 20 mg delayed release capsule Take 20 mg by mouth daily 4/10/17   Historical Provider, MD   potassium chloride (K-DUR,KLOR-CON) 20 mEq tablet Take 1 tablet by mouth 2 (two) times a day for 20 doses 8/5/17 8/15/17  Casey Johnson MD     nursing med rec    Allergies: No Known Allergies    Social History:     Marital Status: Single   Occupation: unknown  Patient Pre-hospital Living Situation: family  Patient Pre-hospital Level of Mobility: unknown  Patient Pre-hospital Diet Restrictions: unknown  Substance Use History:   History   Alcohol Use    Yes     Comment: Socially     History   Smoking Status    Never Smoker   Smokeless Tobacco    Not on file     History   Drug Use No       Family History:    History reviewed  No pertinent family history  Physical Exam:     Vitals:   Blood Pressure: (!) 181/97 (08/13/17 2135)  Pulse: 63 (08/13/17 2135)  Temperature: 98 7 °F (37 1 °C) (08/13/17 1839)  Temp Source: Oral (08/13/17 1839)  Respirations: 20 (08/13/17 2135)  Weight - Scale: 93 5 kg (206 lb 2 1 oz) (08/13/17 1833)  SpO2: 99 % (08/13/17 2135)    Physical Exam   Constitutional: She is oriented to person, place, and time  She appears well-developed and well-nourished  No distress  HENT:   Head: Normocephalic and atraumatic  Mouth/Throat: Oropharynx is clear and moist  No oropharyngeal exudate  Eyes: Conjunctivae are normal  Right eye exhibits no discharge  Left eye exhibits no discharge  No scleral icterus  Neck: Neck supple     Cardiovascular: Normal rate, regular rhythm, normal heart sounds and intact distal pulses  Exam reveals no gallop and no friction rub  No murmur heard  Pulmonary/Chest: Effort normal and breath sounds normal  No respiratory distress  She has no wheezes  She has no rales  She exhibits no tenderness  Abdominal: Soft  Bowel sounds are normal  She exhibits no distension and no mass  There is no tenderness  There is no rebound and no guarding  Patient actively vomiting nonbilious nonbloody in nature   Musculoskeletal: Normal range of motion  She exhibits no edema, tenderness or deformity  Muscle strength 5/5 and equal UE and LE bilaterally  Neurological: She is alert and oriented to person, place, and time  No cranial nerve deficit  Coordination normal    Full sensation UE and LE bilaterally  Skin: Skin is warm and dry  No rash noted  She is not diaphoretic  No erythema  No pallor  Psychiatric: She has a normal mood and affect  Her behavior is normal    Nursing note and vitals reviewed  Additional Data:     Lab Results: I have personally reviewed pertinent reports  Results from last 7 days  Lab Units 08/13/17  1911   WBC Thousand/uL 5 05   HEMOGLOBIN g/dL 12 8   HEMATOCRIT % 39 7   PLATELETS Thousands/uL 170   NEUTROS PCT % 51   LYMPHS PCT % 35   MONOS PCT % 11   EOS PCT % 3       Results from last 7 days  Lab Units 08/13/17  1911   SODIUM mmol/L 141   POTASSIUM mmol/L 3 3*   CHLORIDE mmol/L 103   CO2 mmol/L 32   BUN mg/dL 21   CREATININE mg/dL 1 41*   CALCIUM mg/dL 9 7   TOTAL PROTEIN g/dL 7 6   BILIRUBIN TOTAL mg/dL 0 70   ALK PHOS U/L 77   ALT U/L 24   AST U/L 22   GLUCOSE RANDOM mg/dL 120           Imaging: I have personally reviewed pertinent reports  Xr Chest 2 Views    Result Date: 8/3/2017  Narrative: CHEST INDICATION:  Hypertension and dizziness for 3 weeks  COMPARISON:  None VIEWS:  Frontal and lateral projections IMAGES:  2 FINDINGS:     Cardiomediastinal silhouette appears unremarkable  The lungs are clear  No pneumothorax or pleural effusion  Visualized osseous structures appear within normal limits for the patient's age  Impression: No active pulmonary disease  Workstation performed: [id number]     Ct Head Without Contrast    Result Date: 8/2/2017  Narrative: CT BRAIN - WITHOUT CONTRAST INDICATION:  Dizziness COMPARISON:  None  TECHNIQUE:  CT examination of the brain was performed  In addition to axial images, coronal reformatted images were created and submitted for interpretation  Radiation dose length product (DLP) for this visit:  888 mGy-cm   This examination, like all CT scans performed in the [facility], was performed utilizing techniques to minimize radiation dose exposure, including the use of iterative reconstruction and automated exposure control  IMAGE QUALITY:  Diagnostic  FINDINGS:  PARENCHYMA:  Decreased attenuation is noted in the supratentorial white matter demonstrating an appearance most consistent with mild microangiopathic change  No intracranial mass, mass effect or midline shift  No CT signs of acute infarction  There is no parenchymal hemorrhage  Atherosclerotic calcifications of the cavernous segment of the internal carotid artery are mild  VENTRICLES AND EXTRA-AXIAL SPACES:  Normal for patient's age  VISUALIZED ORBITS AND PARANASAL SINUSES:  Right maxillary sinus and left sphenoidal sinus opacities noted possibly uterus retention cysts and sinus disease  CALVARIUM AND EXTRACRANIAL SOFT TISSUES:   Normal      Impression: No acute intracranial abnormality  Microangiopathic changes   Workstation performed: [id number]     Cta Ed Chest Pe Study    Result Date: 8/13/2017  Narrative: CTA - CHEST WITH IV CONTRAST - PULMONARY ANGIOGRAM INDICATION: "CP, dyspnea, recent travel, elevated d-dimer""prior history of left lower leg phlebitis the patient states she was treated with Coumadin for (patient unaware of diagnosis of DVT and used the word "phlebitis"), presents to the emergency department complaining of lightheadedness, central and left-sided chest pain as well as difficulty taking a deep breath   " COMPARISON: Two-view chest from earlier the same day  TECHNIQUE: CTA examination of the chest was performed using angiographic technique according to a protocol specifically tailored to evaluate for pulmonary embolism  Reformatted images were created in axial, sagittal, and coronal planes  In addition, coronal 3D MIP postprocessing was performed on the acquisition scanner  Radiation dose length product (DLP) for this visit:  528 mGy-cm   This examination, like all CT scans performed in the [facility], was performed utilizing techniques to minimize radiation dose exposure, including the use of iterative reconstruction and automated exposure control  IV Contrast:  85 mL of iodixanol (VISIPAQUE)  320  FINDINGS: PULMONARY ARTERIAL TREE:  No pulmonary embolus is seen  LUNGS:  Discoid left lower lobe nodule measuring 10 mm x 5 mm  4 mm right upper lobe nodule series 3 image 27  Partially calcified right middle lobe nodule in keeping with a granuloma  No pulmonary consolidation  No endotracheal or endobronchial lesion  PLEURA:  Unremarkable  HEART/AORTA:  Cardiomegaly  No thoracic aortic aneurysm  MEDIASTINUM AND BRANDO:  Unremarkable  CHEST WALL AND LOWER NECK:       Unremarkable  VISUALIZED STRUCTURES IN THE UPPER ABDOMEN:  Partially imaged large right renal cyst with internal septation measuring at least 9 3 cm  OSSEOUS STRUCTURES:  No acute fracture or destructive osseous lesion  Impression: No pulmonary arterial embolism or acute pulmonary findings  Dominant 10 mm left lower lobe nodule  Consider follow-up with PET scan if this has not been previously characterized   ##imslh##imslh       Workstation performed: [id number]       EKG, Pathology, and Other Studies Reviewed on Admission:   · EKG: normal  · CBC, CMP, magnesium    Allscripts / Epic Records Reviewed: Yes     ** Please Note: This note has been constructed using a voice recognition system   **

## 2017-08-14 NOTE — PLAN OF CARE
Problem: DISCHARGE PLANNING - CARE MANAGEMENT  Goal: Discharge to post-acute care or home with appropriate resources  INTERVENTIONS:  - Conduct assessment to determine patient/family and health care team treatment goals, and need for post-acute services based on payer coverage, community resources, and patient preferences, and barriers to discharge  - Address psychosocial, clinical, and financial barriers to discharge as identified in assessment in conjunction with the patient/family and health care team  - Arrange appropriate level of post-acute services according to patients   needs and preference and payer coverage in collaboration with the physician and health care team  - Communicate with and update the patient/family, physician, and health care team regarding progress on the discharge plan  - Arrange appropriate transportation to post-acute venues  Outcome: Progressing  CM met with patient at bedside  OBS status explained to pt, pt signed OBS form, copy given to pt and copy sent to medical records for scanning  Pt does not use any DME and is not current with any home health agency  Pt is independent with ADL's  Pt uses [facility] in [place]  [email or web address] or [employer] and has no problem with co-pays  Pt does not have POA or Adv  Dir  Pt does not drive and daughter can provide assistance and transportation as needed  CM will continue to monitor pt and needs  Plan for d/c:Home with no needs

## 2017-08-14 NOTE — SOCIAL WORK
CM met with patient at bedside  OBS status explained to pt, pt signed OBS form, copy given to pt and copy sent to medical records for scanning  Pt does not use any DME and is not current with any home health agency  Pt is independent with ADL's  Pt uses [facility] or [employer] and has no problem with co-pays  Pt does not have POA or Adv  Dir  Pt does not drive and daughter can provide assistance and transportation as needed  CM will continue to monitor pt and needs  Plan for d/c:Home with no needs

## 2017-08-15 ENCOUNTER — APPOINTMENT (INPATIENT)
Dept: NUCLEAR MEDICINE | Facility: HOSPITAL | Age: 72
DRG: 313 | End: 2017-08-15
Payer: COMMERCIAL

## 2017-08-15 ENCOUNTER — APPOINTMENT (INPATIENT)
Dept: NON INVASIVE DIAGNOSTICS | Facility: HOSPITAL | Age: 72
DRG: 313 | End: 2017-08-15
Payer: COMMERCIAL

## 2017-08-15 VITALS
WEIGHT: 206.13 LBS | DIASTOLIC BLOOD PRESSURE: 80 MMHG | HEIGHT: 64 IN | OXYGEN SATURATION: 98 % | HEART RATE: 71 BPM | BODY MASS INDEX: 35.19 KG/M2 | TEMPERATURE: 98 F | SYSTOLIC BLOOD PRESSURE: 160 MMHG | RESPIRATION RATE: 18 BRPM

## 2017-08-15 PROBLEM — N17.9 AKI (ACUTE KIDNEY INJURY) (HCC): Status: RESOLVED | Noted: 2017-08-13 | Resolved: 2017-08-15

## 2017-08-15 LAB
ANION GAP SERPL CALCULATED.3IONS-SCNC: 5 MMOL/L (ref 4–13)
BUN SERPL-MCNC: 11 MG/DL (ref 5–25)
CALCIUM SERPL-MCNC: 9 MG/DL (ref 8.3–10.1)
CHLORIDE SERPL-SCNC: 105 MMOL/L (ref 100–108)
CO2 SERPL-SCNC: 30 MMOL/L (ref 21–32)
CREAT SERPL-MCNC: 0.93 MG/DL (ref 0.6–1.3)
GFR SERPL CREATININE-BSD FRML MDRD: 71 ML/MIN/1.73SQ M
GLUCOSE SERPL-MCNC: 103 MG/DL (ref 65–140)
MAX DIASTOLIC BP: 78 MMHG
MAX HEART RATE: 100 BPM
MAX PREDICTED HEART RATE: 147 BPM
MAX. SYSTOLIC BP: 160 MMHG
POTASSIUM SERPL-SCNC: 3.5 MMOL/L (ref 3.5–5.3)
PROTOCOL NAME: NORMAL
REASON FOR TERMINATION: NORMAL
SODIUM SERPL-SCNC: 140 MMOL/L (ref 136–145)
TARGET HR FORMULA: NORMAL
TEST INDICATION: NORMAL
TIME IN EXERCISE PHASE: 180 S

## 2017-08-15 PROCEDURE — 93017 CV STRESS TEST TRACING ONLY: CPT

## 2017-08-15 PROCEDURE — 78452 HT MUSCLE IMAGE SPECT MULT: CPT

## 2017-08-15 PROCEDURE — 80048 BASIC METABOLIC PNL TOTAL CA: CPT | Performed by: INTERNAL MEDICINE

## 2017-08-15 PROCEDURE — A9502 TC99M TETROFOSMIN: HCPCS

## 2017-08-15 RX ORDER — CARVEDILOL 6.25 MG/1
6.25 TABLET ORAL 2 TIMES DAILY WITH MEALS
Status: DISCONTINUED | OUTPATIENT
Start: 2017-08-15 | End: 2017-08-15 | Stop reason: HOSPADM

## 2017-08-15 RX ORDER — CARVEDILOL 25 MG/1
12.5 TABLET ORAL 2 TIMES DAILY WITH MEALS
Qty: 60 TABLET | Refills: 0
Start: 2017-08-15 | End: 2017-08-25 | Stop reason: HOSPADM

## 2017-08-15 RX ADMIN — REGADENOSON 0.4 MG: 0.08 INJECTION, SOLUTION INTRAVENOUS at 10:13

## 2017-08-15 RX ADMIN — CARVEDILOL 12.5 MG: 12.5 TABLET, FILM COATED ORAL at 08:16

## 2017-08-15 RX ADMIN — HYDRALAZINE HYDROCHLORIDE 5 MG: 20 INJECTION INTRAMUSCULAR; INTRAVENOUS at 12:03

## 2017-08-15 RX ADMIN — PANTOPRAZOLE SODIUM 40 MG: 40 TABLET, DELAYED RELEASE ORAL at 05:06

## 2017-08-15 RX ADMIN — ENOXAPARIN SODIUM 40 MG: 40 INJECTION SUBCUTANEOUS at 08:16

## 2017-08-15 RX ADMIN — POTASSIUM CHLORIDE 20 MEQ: 1500 TABLET, EXTENDED RELEASE ORAL at 08:16

## 2017-08-15 RX ADMIN — ASPIRIN 81 MG 81 MG: 81 TABLET ORAL at 08:16

## 2017-08-15 RX ADMIN — Medication 400 MG: at 08:16

## 2017-08-15 NOTE — CASE MANAGEMENT
[address] [place] [address] in the [facility] by [place] for 2017  Network Utilization Review Department  Phone: [phone]; Fax [phone]  ATTENTION: The Network Utilization Review Department is now centralized for our 7 Facilities  Make a note that we have a new phone and fax numbers for our Department  Please call with any questions or concerns to [phone] and carefully follow the prompts so that you are directed to the right person  All voicemails are confidential  Fax any determinations, approvals, denials, and requests for initial or continue stay review clinical to [phone]  Due to HIGH CALL volume, it would be easier if you could please send faxed requests to expedite your requests and in part, help us provide discharge notifications faster     ===========================  See Candace's initial review below  ===========================    Continued Stay Review    Date: 8/15/17     Was OBS 8/13/17 @2020  CHANGED TO INPATIENT 8/14/17 @1258  08/14/17 1258  Inpatient Admission Once     Transfer Service: General Medicine       Question Answer   Admitting Physician Naya Mcfarland   Level of Care Med Surg   Estimated length of stay More than 2 Midnights   Certification I certify that inpatient services are medically necessary for this patient for a duration of greater than two midnights  See H&P and MD Progress Notes for additional information about the patient's course of treatment         08/14/17 1257       Vital Signs: /71   Pulse 57   Temp 98 3 °F (36 8 °C) (Oral)   Resp 18   Ht 5' 4" (1 626 m)   Wt 93 5 kg (206 lb 2 1 oz)   SpO2 95%   BMI 35 38 kg/m²      181/97   183/80  Medications:   Scheduled Meds:   aspirin 81 mg Oral Daily   atorvastatin 40 mg Oral QPM   carvedilol 12 5 mg Oral BID With Meals   enoxaparin 40 mg Subcutaneous Daily   magnesium oxide 400 mg Oral Daily   pantoprazole 40 mg Oral Early Morning   potassium chloride 20 mEq Oral BID PRN Meds:   acetaminophen    calcium carbonate    hydrALAZINE    nitroglycerin    ondansetron    Abnormal Labs/Diagnostic Results:   8/15: k 3 1   Gluc 106   Trops wnl  Nuc stress: normal    Age/Sex: 66 y o  female     Assessment/Plan:   PER MED 8/14:  Plan:   1  Chest pain, recent admission for similar reasons of dizziness recently discharged August 1st, cardiology evaluated plan at this time is for cardiac stress test tomorrow  2  Continue with the rest of the medications as ordered  3  Patient has now been started on Coreg dose of which was decreased, the patient feels that she has some neck swelling since then no respiratory distress, cardiology decrease the dose to 12 5 b i d   4  Acute kidney injury likely prerenal improved with fluids back to baseline DC fluids  5  Uncontrolled hypertension, DC fluids as for the meds reviewed and will continue  6  Hypokalemia, replete and monitor  7  Lung nodules on the CTA, outpatient follow-up, discussed   Certification Statement: The patient will continue to require additional inpatient hospital stay due to chest pain    PER CARDIO ATTENDING 8/14:  Patient admitted with symptoms of dizziness and shortness of breath  Patient also appears slightly volume depleted and had hypokalemia which could be secondary to diuretics  Would recommend discontinuation of chlorthalidone  Patient concerned about Coreg and causing dizziness  Will decrease the dosage and see how she does  Patient will also be scheduled for a pharmacological nuclear stress test to assess for ischemia    Discharge Plan:  To be determined

## 2017-08-15 NOTE — PLAN OF CARE
CARDIOVASCULAR - ADULT     Maintains optimal cardiac output and hemodynamic stability Progressing     Absence of cardiac dysrhythmias or at baseline rhythm Progressing        DISCHARGE PLANNING     Discharge to home or other facility with appropriate resources Progressing        DISCHARGE PLANNING - CARE MANAGEMENT     Discharge to post-acute care or home with appropriate resources Progressing        GENITOURINARY - ADULT     Maintains or returns to baseline urinary function Progressing     Absence of urinary retention Progressing        Knowledge Deficit     Patient/family/caregiver demonstrates understanding of disease process, treatment plan, medications, and discharge instructions Progressing        METABOLIC, FLUID AND ELECTROLYTES - ADULT     Electrolytes maintained within normal limits Progressing     Fluid balance maintained Progressing        PAIN - ADULT     Verbalizes/displays adequate comfort level or baseline comfort level Progressing        Potential for Falls     Patient will remain free of falls Progressing        SAFETY ADULT     Patient will remain free of falls Progressing     Maintain or return to baseline ADL function Progressing     Maintain or return mobility status to optimal level Progressing

## 2017-08-15 NOTE — SOCIAL WORK
CM explained IMM and pt signed IMM, copy given to pt and copy sent to medical records for scanning  Pt to be d/c to home today with family with no needs

## 2017-08-15 NOTE — DISCHARGE SUMMARY
Discharge Summary - [address] [facility]    Patient Information: Gildardo Douglas 66 y o  female MRN: [id number]  Unit/Bed#: -01 Encounter: [id number]    Discharging Physician / Practitioner: Sravan Monzon MD  PCP: Ana Dumas MD  Admission Date: 8/13/2017  Discharge Date: 08/15/17    Reason for Admission:  Chest pain    Discharge Diagnoses:     Principal Problem:    Chest pain  Active Problems:    Dizziness    Hypokalemia    Hypertension    Nodule of left lung    Positive D dimer  Resolved Problems:    NADEEN (acute kidney injury)    Present on Admission:   Chest pain   Dizziness   Hypertension   Nodule of left lung   Hypokalemia   (Resolved) NADEEN (acute kidney injury)   Positive D dimer    Consultations During Hospital Stay:  · Cardiology    Procedures Performed:     · Echocardiogram  · Stress test-unremarkable    Significant Findings:     · Nonpitting significant    Incidental Findings:   · As above     Test Results Pending at Discharge (will require follow up): · None     Outpatient Tests Requested:  · None    Complications:  None    Hospital Course:     Gildardo Douglas is a 66 y o  female patient who originally presented to the hospital on 8/13/2017 due to just comfort and dizziness  She had further workup done including a stress test which is unremarkable  Echocardiogram also shows normal ejection fraction  Blood pressure was on the lower side  Her Coreg dose has been decreased, however, now her blood pressure is creeping up again  I have made her Coreg to be 25 mg half tablet twice daily instead of 1/4 tablet twice daily  She feels otherwise good  Eager to be discharged home  No further chest pain or pressure symptoms  Also dizziness resolved  Acute kidney injury resolved after IV hydration  She will resume her antihypertensive medications/hydrochlorothiazide as well in addition to Coreg as above      Condition at Discharge: good     Discharge Day Visit / Exam:     Subjective: Feels good today  No further chest pain  Not being dizzy or lightheaded  No nausea or vomiting  No fever or chills  Vitals: Blood Pressure: 165/75 (08/15/17 1203)  Pulse: 64 (08/15/17 1100)  Temperature: 98 °F (36 7 °C) (08/15/17 1100)  Temp Source: Oral (08/15/17 1100)  Respirations: 18 (08/15/17 1100)  Height: 5' 4" (162 6 cm) (08/13/17 2201)  Weight - Scale: 93 5 kg (206 lb 2 1 oz) (08/13/17 2201)  SpO2: 98 % (08/15/17 1100)  Exam:        Vital signs are reviewed as above  Constitutional:  Patient i up in bed  She is not in any distress ably  Eyes: EOM grossly intact  HENT: Oropharynx are moist    Neck: Neck is obese and supple  Cardiac: I did not hear any rubs or gallop  Patient appears to be in sign rhythm  Respiratory: Patient not in significant respiratory distress  Air entry in general is fair  GI: Abdomen is soft  It is grossly nontender  Bowel sounds are adequate  I was not able to appreciate any hepatosplenomegaly  Neurologic:  Patient is awake and alert  Neurological examination is grossly intact  No obvious focal neurological deficit noticed  Skin: Skin is warm and dry  Psychiatric: Mood and affect are pleasant  Musculoskeletal  Patient moving all extremities   Has chronic arthritic joints   Extremities: Patient has mild chronic  lower extremity edema               Discharge instructions/Information to patient and family:   See after visit summary for information provided to patient and family  Provisions for Follow-Up Care:  See after visit summary for information related to follow-up care and any pertinent home health orders  Disposition:     Home    For Discharges to   [facility]:   · Not Applicable to this Patient - Not Applicable to this Patient    Planned Readmission:  None     Discharge Statement:  I spent more than 32 minutes discharging the patient  This time was spent on the day of discharge  I had direct contact with the patient on the day of discharge  Greater than 50% of the total time was spent examining patient, answering all patient questions, arranging and discussing plan of care with patient as well as directly providing post-discharge instructions  Additional time then spent on discharge activities  Discharge Medications:  See after visit summary for reconciled discharge medications provided to patient and family  ** Please Note: Dragon 360 Dictation voice to text software may have been used in the creation of this document   **

## 2017-08-15 NOTE — PLAN OF CARE
Problem: DISCHARGE PLANNING - CARE MANAGEMENT  Goal: Discharge to post-acute care or home with appropriate resources  INTERVENTIONS:  - Conduct assessment to determine patient/family and health care team treatment goals, and need for post-acute services based on payer coverage, community resources, and patient preferences, and barriers to discharge  - Address psychosocial, clinical, and financial barriers to discharge as identified in assessment in conjunction with the patient/family and health care team  - Arrange appropriate level of post-acute services according to patient's   needs and preference and payer coverage in collaboration with the physician and health care team  - Communicate with and update the patient/family, physician, and health care team regarding progress on the discharge plan  - Arrange appropriate transportation to post-acute venues   Outcome: Completed Date Met: 08/15/17  CM explained IMM and pt signed IMM, copy given to pt and copy sent to medical records for scanning  Pt to be d/c to home today with family with no needs

## 2017-08-15 NOTE — PLAN OF CARE
Problem: PAIN - ADULT  Goal: Verbalizes/displays adequate comfort level or baseline comfort level  Interventions:  - Encourage patient to monitor pain and request assistance  - Assess pain using appropriate pain scale  - Administer analgesics based on type and severity of pain and evaluate response  - Implement non-pharmacological measures as appropriate and evaluate response  - Consider cultural and social influences on pain and pain management  - Notify physician/advanced practitioner if interventions unsuccessful or patient reports new pain   Outcome: Progressing      Problem: SAFETY ADULT  Goal: Patient will remain free of falls  INTERVENTIONS:  - Assess patient frequently for physical needs  -  Identify cognitive and physical deficits and behaviors that affect risk of falls    -  [place] fall precautions as indicated by assessment   - Educate patient/family on patient safety including physical limitations  - Instruct patient to call for assistance with activity based on assessment  - Modify environment to reduce risk of injury  - Consider OT/PT consult to assist with strengthening/mobility    Outcome: Progressing    Goal: Maintain or return to baseline ADL function  INTERVENTIONS:  -  Assess patient's ability to carry out ADLs; assess patient's baseline for ADL function and identify physical deficits which impact ability to perform ADLs (bathing, care of mouth/teeth, toileting, grooming, dressing, etc )  - Assess/evaluate cause of self-care deficits   - Assess range of motion  - Assess patient's mobility; develop plan if impaired  - Assess patient's need for assistive devices and provide as appropriate  - Encourage maximum independence but intervene and supervise when necessary  ¯ Involve family in performance of ADLs  ¯ Assess for home care needs following discharge   ¯ Request OT consult to assist with ADL evaluation and planning for discharge  ¯ Provide patient education as appropriate   Outcome: Progressing    Goal: Maintain or return mobility status to optimal level  INTERVENTIONS:  - Assess patient's baseline mobility status (ambulation, transfers, stairs, etc )    - Identify cognitive and physical deficits and behaviors that affect mobility  - Identify mobility aids required to assist with transfers and/or ambulation (gait belt, sit-to-stand, lift, walker, cane, etc )  - [place] fall precautions as indicated by assessment  - Record patient progress and toleration of activity level on Mobility SBAR; progress patient to next Phase/Stage  - Instruct patient to call for assistance with activity based on assessment  - Request Rehabilitation consult to assist with strengthening/weightbearing, etc    Outcome: Progressing      Problem: DISCHARGE PLANNING  Goal: Discharge to home or other facility with appropriate resources  INTERVENTIONS:  - Identify barriers to discharge w/patient and caregiver  - Arrange for needed discharge resources and transportation as appropriate  - Identify discharge learning needs (meds, wound care, etc )  - Arrange for interpretive services to assist at discharge as needed  - Refer to Case Management Department for coordinating discharge planning if the patient needs post-hospital services based on physician/advanced practitioner order or complex needs related to functional status, cognitive ability, or social support system   Outcome: Progressing      Problem: Knowledge Deficit  Goal: Patient/family/caregiver demonstrates understanding of disease process, treatment plan, medications, and discharge instructions  Complete learning assessment and assess knowledge base    Interventions:  - Provide teaching at level of understanding  - Provide teaching via preferred learning methods   Outcome: Progressing      Problem: CARDIOVASCULAR - ADULT  Goal: Maintains optimal cardiac output and hemodynamic stability  INTERVENTIONS:  - Monitor I/O, vital signs and rhythm  - Monitor for S/S and trends of decreased cardiac output i e  bleeding, hypotension  - Administer and titrate ordered vasoactive medications to optimize hemodynamic stability  - Assess quality of pulses, skin color and temperature  - Assess for signs of decreased coronary artery perfusion - ex  Angina  - Instruct patient to report change in severity of symptoms   Outcome: Progressing    Goal: Absence of cardiac dysrhythmias or at baseline rhythm  INTERVENTIONS:  - Continuous cardiac monitoring, monitor vital signs, obtain 12 lead EKG if indicated  - Administer antiarrhythmic and heart rate control medications as ordered  - Monitor electrolytes and administer replacement therapy as ordered   Outcome: Progressing      Problem: DISCHARGE PLANNING - CARE MANAGEMENT  Goal: Discharge to post-acute care or home with appropriate resources  INTERVENTIONS:  - Conduct assessment to determine patient/family and health care team treatment goals, and need for post-acute services based on payer coverage, community resources, and patient preferences, and barriers to discharge  - Address psychosocial, clinical, and financial barriers to discharge as identified in assessment in conjunction with the patient/family and health care team  - Arrange appropriate level of post-acute services according to patient's   needs and preference and payer coverage in collaboration with the physician and health care team  - Communicate with and update the patient/family, physician, and health care team regarding progress on the discharge plan  - Arrange appropriate transportation to post-acute venues   Outcome: Progressing      Problem: Potential for Falls  Goal: Patient will remain free of falls  INTERVENTIONS:  - Assess patient frequently for physical needs  -  Identify cognitive and physical deficits and behaviors that affect risk of falls    -  [place] fall precautions as indicated by assessment   - Educate patient/family on patient safety including physical limitations  - Instruct patient to call for assistance with activity based on assessment  - Modify environment to reduce risk of injury  - Consider OT/PT consult to assist with strengthening/mobility    Outcome: Progressing      Problem: GENITOURINARY - ADULT  Goal: Maintains or returns to baseline urinary function  INTERVENTIONS:  - Assess urinary function  - Encourage oral fluids to ensure adequate hydration  - Administer IV fluids as ordered to ensure adequate hydration  - Administer ordered medications as needed  - Offer frequent toileting  - Follow urinary retention protocol if ordered   Outcome: Progressing    Goal: Absence of urinary retention  INTERVENTIONS:  - Assess patient's ability to void and empty bladder  - Monitor I/O  - Bladder scan as needed  - Discuss with physician/AP medications to alleviate retention as needed  - Discuss catheterization for long term situations as appropriate   Outcome: Progressing      Problem: METABOLIC, FLUID AND ELECTROLYTES - ADULT  Goal: Electrolytes maintained within normal limits  INTERVENTIONS:  - Monitor labs and assess patient for signs and symptoms of electrolyte imbalances  - Administer electrolyte replacement as ordered  - Monitor response to electrolyte replacements, including repeat lab results as appropriate  - Instruct patient on fluid and nutrition as appropriate   Outcome: Progressing    Goal: Fluid balance maintained  INTERVENTIONS:  - Monitor labs and assess for signs and symptoms of volume excess or deficit  - Monitor I/O and WT  - Instruct patient on fluid and nutrition as appropriate   Outcome: Progressing

## 2017-08-15 NOTE — PROGRESS NOTES
General Cardiology   Progress Note   Rito Deleon 66 y o  female MRN: [id number]  Unit/Bed#: -01 Encounter: [id number]      SUBJECTIVE:   No significant events overnight  Im feeling good  REVIEW OF SYSTEMS:  Constitutional:  Denies fever or chills   Eyes:  Denies change in visual acuity   HENT:  Denies nasal congestion or sore throat   Respiratory:  Denies cough or shortness of breath   Cardiovascular:  Denies chest pain or edema   GI:  Denies abdominal pain, nausea, vomiting, bloody stools or diarrhea   :  Denies dysuria, frequency, difficulty in micturition and nocturia  Musculoskeletal:  Denies back pain or joint pain   Neurologic:  Denies headache, focal weakness or sensory changes   Endocrine:  Denies polyuria or polydipsia   Lymphatic:  Denies swollen glands   Psychiatric:  Denies depression or anxiety     OBJECTIVE:   Vitals:  Vitals:    08/15/17 1203   BP: 165/75   Pulse:    Resp:    Temp:    SpO2:      Body mass index is 35 38 kg/m²  Systolic (64EUR), NLF:505 , Min:101 , XTX:337     Diastolic (51TTN), MQK:43, Min:50, Max:89      Intake/Output Summary (Last 24 hours) at 08/15/17 1247  Last data filed at 08/15/17 9842   Gross per 24 hour   Intake              840 ml   Output             2030 ml   Net            -1190 ml     Weight (last 2 days)     Date/Time   Weight    08/13/17 2201  93 5 (206 13)    08/13/17 1833  93 5 (206 13)              Telemetry Review: SR     PHYSICAL EXAMS:  General:  Patient is not in acute distress, laying in the bed comfortably, awake, alert responding to commands  Head: Normocephalic, Atraumatic  HEENT:  Both pupils normal-size atraumatic, normocephalic, nonicteric  Neck:  JVP not raised  Trachea central  Respiratory:  Bronchovascular breathing all over the chest without any accompaniment  Cardiovascular:  S1 S2 normal RRR  GI:  Abdomen soft nontender   Liver and spleen normal size  Lymphatic:  No cervical or inguinal lymphadenopathy  Neurologic:  Patient is awake alert, responding to command, well-oriented to time and place and person moving     LABORATORY RESULTS:    Results from last 7 days  Lab Units 08/14/17  0515 08/14/17  0157 08/13/17 2238   TROPONIN I ng/mL <0 02 <0 02 <0 02       CBC with diff:   Results from last 7 days  Lab Units 08/14/17  0515 08/13/17 2238 08/13/17 1911   WBC Thousand/uL 4 90  --  5 05   HEMOGLOBIN g/dL 11 7  --  12 8   HEMATOCRIT % 36 7  --  39 7   MCV fL 90  --  90   PLATELETS Thousands/uL 166 174 170   MCH pg 28 6  --  29 2   MCHC g/dL 31 9  --  32 2   RDW % 14 8  --  14 9   MPV fL 12 5 12 8* 13 0*   NRBC AUTO /100 WBCs  --   --  0       CMP:  Results from last 7 days  Lab Units 08/15/17  0545 08/14/17  0515 08/13/17  1911   SODIUM mmol/L 140 140 141   POTASSIUM mmol/L 3 5 3 1* 3 3*   CHLORIDE mmol/L 105 104 103   CO2 mmol/L 30 30 32   ANION GAP mmol/L 5 6 6   BUN mg/dL 11 15 21   CREATININE mg/dL 0 93 0 94 1 41*   GLUCOSE RANDOM mg/dL 103 106 120   CALCIUM mg/dL 9 0 8 7 9 7   AST U/L  --   --  22   ALT U/L  --   --  24   ALK PHOS U/L  --   --  77   TOTAL PROTEIN g/dL  --   --  7 6   ALBUMIN g/dL  --   --  3 7   BILIRUBIN TOTAL mg/dL  --   --  0 70   EGFR ml/min/1 73sq m 71 70 43       BMP:  Results from last 7 days  Lab Units 08/15/17  0545 08/14/17  0515 08/13/17  1911   SODIUM mmol/L 140 140 141   POTASSIUM mmol/L 3 5 3 1* 3 3*   CHLORIDE mmol/L 105 104 103   CO2 mmol/L 30 30 32   BUN mg/dL 11 15 21   CREATININE mg/dL 0 93 0 94 1 41*   GLUCOSE RANDOM mg/dL 103 106 120   CALCIUM mg/dL 9 0 8 7 9 7         Results from last 7 days  Lab Units 08/13/17 2238   NT-PRO BNP pg/mL 49        Results from last 7 days  Lab Units 08/13/17  1911   MAGNESIUM mg/dL 1 6               Results from last 7 days  Lab Units 08/14/17  0515   INR  0 96       Lipid Profile:   Lab Results   Component Value Date    CHOL 187 08/14/2017     Lab Results   Component Value Date    HDL 67 (H) 08/14/2017     Lab Results   Component Value Date    LDLCALC 110 (H) 2017     Lab Results   Component Value Date    TRIG 50 2017       Cardiac testing:  Results for orders placed during the hospital encounter of 17   Echo complete with contrast if indicated    Narrative [address] [place] [address] [phone]    Transthoracic Echocardiogram  2D, M-mode, Doppler, and Color Doppler    Study date:  03-Aug-2017    Patient: Rachel Villeda  MR number: [id number]  Account number: [de-identified]  : 1945  Age: 66 years  Gender: Female  Status: Outpatient  Location: Bedside  Height: 64 in  Weight: 205 lb  BP: 116/ 62 mmHg    Indications: Hypertensive heart disease, Assess left ventricular function  Diagnoses: I11 9 - Hypertensive heart disease without heart failure    Sonographer:   Elton Bryant RDCS  Interpreting Physician:  Smith Chavez MD  Referring Physician:  Lety Wells PA-C  Group:  [facility]    SUMMARY    PROCEDURE INFORMATION:  This was a technically difficult study  Echocardiographic views were limited due to poor acoustic window availability, decreased penetration, and lung interference  LEFT VENTRICLE:  Systolic function was normal  Ejection fraction was estimated to be 60 %  There were no regional wall motion abnormalities  Doppler parameters were consistent with abnormal left ventricular relaxation (grade 1 diastolic dysfunction)  RIGHT VENTRICLE:  The size was normal   Systolic function was normal     TRICUSPID VALVE:  There was mild regurgitation  Pulmonary artery systolic pressure was within the normal range  HISTORY: Dizziness  PRIOR HISTORY: Risk factors: hypertension and morbid obesity  PROCEDURE: The procedure was performed at the bedside  This was a routine study  The transthoracic approach was used  The study included complete 2D imaging, M-mode, complete spectral Doppler, and color Doppler   The heart rate was 63 bpm,  at the start of the study  Images were obtained from the parasternal, apical, and subcostal acoustic windows  Images were not obtained from the suprasternal notch acoustic windows  Echocardiographic views were limited due to poor acoustic  window availability, decreased penetration, and lung interference  This was a technically difficult study  LEFT VENTRICLE: Size was normal  Systolic function was normal  Ejection fraction was estimated to be 60 %  There were no regional wall motion abnormalities  Wall thickness was normal  No evidence of apical thrombus  DOPPLER: Doppler  parameters were consistent with abnormal left ventricular relaxation (grade 1 diastolic dysfunction)  RIGHT VENTRICLE: The size was normal  Systolic function was normal  Wall thickness was normal     LEFT ATRIUM: Size was normal     RIGHT ATRIUM: Size was normal     MITRAL VALVE: Valve structure was normal  There was normal leaflet separation  DOPPLER: The transmitral velocity was within the normal range  There was no evidence for stenosis  There was no significant regurgitation  AORTIC VALVE: The valve was trileaflet  Leaflets exhibited mildly increased thickness and normal cuspal separation  DOPPLER: Transaortic velocity was within the normal range  There was no evidence for stenosis  There was no significant  regurgitation  TRICUSPID VALVE: The valve structure was normal  There was normal leaflet separation  DOPPLER: The transtricuspid velocity was within the normal range  There was no evidence for stenosis  There was mild regurgitation  Pulmonary artery  systolic pressure was within the normal range  PULMONIC VALVE: Leaflets exhibited normal thickness, no calcification, and normal cuspal separation  DOPPLER: The transpulmonic velocity was within the normal range  There was no significant regurgitation  PERICARDIUM: There was no pericardial effusion  The pericardium was normal in appearance  AORTA: The root exhibited normal size      SYSTEMIC VEINS: IVC: The inferior vena cava was normal in size   Respirophasic changes were normal     SYSTEM MEASUREMENT TABLES    2D  %FS: 33 6 %  Ao Diam: 2 6 cm  EDV(Teich): 92 8 ml  EF(Teich): 62 5 %  ESV(Teich): 34 8 ml  IVSd: 0 9 cm  LA Area: 13 4 cm2  LA Diam: 3 1 cm  LVEDV MOD A4C: 105 3 ml  LVEF MOD A4C: 72 2 %  LVESV MOD A4C: 29 2 ml  LVIDd: 4 5 cm  LVIDs: 3 cm  LVLd A4C: 7 3 cm  LVLs A4C: 6 cm  LVPWd: 0 8 cm  RA Area: 12 6 cm2  RVIDd: 3 9 cm  SV MOD A4C: 76 ml  SV(Teich): 58 ml    CW  TR Vmax: 2 8 m/s  TR maxP 8 mmHg    MM  TAPSE: 2 4 cm    PW  E': 0 1 m/s  E/E': 13 7  MV A Darrell: 1 m/s  MV Dec Pope: 2 4 m/s2  MV DecT: 335 1 ms  MV E Darrell: 0 8 m/s  MV E/A Ratio: 0 8  MV PHT: 97 2 ms  MVA By PHT: 2 3 cm2    [facility] Accredited Ech[facility]    Prepared and electronically signed by    Zainab Sanchez MD  Signed 03-Aug-2017 17:40:08           Meds/Allergies   all current active meds have been reviewed and current meds:   Current Facility-Administered Medications   Medication Dose Route Frequency    acetaminophen (TYLENOL) tablet 650 mg  650 mg Oral Q6H PRN    aspirin chewable tablet 81 mg  81 mg Oral Daily    atorvastatin (LIPITOR) tablet 40 mg  40 mg Oral QPM    calcium carbonate (TUMS) chewable tablet 1,000 mg  1,000 mg Oral Daily PRN    carvedilol (COREG) tablet 6 25 mg  6 25 mg Oral BID With Meals    enoxaparin (LOVENOX) subcutaneous injection 40 mg  40 mg Subcutaneous Daily    hydrALAZINE (APRESOLINE) injection 5 mg  5 mg Intravenous Q4H PRN    magnesium oxide (MAG-OX) tablet 400 mg  400 mg Oral Daily    nitroglycerin (NITROSTAT) SL tablet 0 4 mg  0 4 mg Sublingual Q5 Min PRN    ondansetron (ZOFRAN) injection 4 mg  4 mg Intravenous Q6H PRN    pantoprazole (PROTONIX) EC tablet 40 mg  40 mg Oral Early Morning    potassium chloride (K-DUR,KLOR-CON) CR tablet 20 mEq  20 mEq Oral BID     Prescriptions Prior to Admission   Medication    aspirin 81 mg chewable tablet    carvedilol (COREG) 25 mg tablet    chlorthalidone 25 mg tablet    magnesium oxide (MAG-OX) 400 mg    omeprazole (PriLOSEC) 20 mg delayed release capsule    potassium chloride (K-DUR,KLOR-CON) 20 mEq tablet          ASSESSMENT & PLAN   1  Chest pain, trop negative  Stress done -- negative for ischemia, echo done last admission ef 60%  2  HTN -- stable  Decrease coreg to 6 25mg bid  3  Dizziness -- improved  Continue all meds     Ok for DC from cardiac standpoint  Junior Galvez PA-C  8/15/2017,12:47 PM    Portions of the record may have been created with voice recognition software   Occasional wrong word or "sound a like" substitutions may have occurred due to the inherent limitations of voice recognition software   Read the chart carefully and recognize, using context, where substitutions have occurred

## 2017-08-16 NOTE — CASE MANAGEMENT
Notification of Discharge  This is a Notification of Discharge from our facility [address]  Please be advised that this patient has been discharge from our facility  Below you will find the admission and discharge date and time including the patients disposition  PRESENTATION DATE: 8/13/2017  6:30 PM  IP ADMISSION DATE: 8/14/17 1257  DISCHARGE DATE: 8/15/2017  6:00 PM  DISPOSITION: Home/Self Care    [address] in the [facility] [place] for 2017  Network Utilization Review Department  Phone: [phone]; Fax [phone]  ATTENTION: The Network Utilization Review Department is now centralized for our 7 Facilities  Make a note that we have a new phone and fax numbers for our Department  Please call with any questions or concerns to [phone] and carefully follow the prompts so that you are directed to the right person  All voicemails are confidential  Fax any determinations, approvals, denials, and requests for initial or continue stay review clinical to [phone]  Due to HIGH CALL volume, it would be easier if you could please send faxed requests to expedite your requests and in part, help us provide discharge notifications faster

## 2017-08-18 ENCOUNTER — APPOINTMENT (OUTPATIENT)
Dept: ULTRASOUND IMAGING | Facility: HOSPITAL | Age: 72
DRG: 304 | End: 2017-08-18
Payer: COMMERCIAL

## 2017-08-18 ENCOUNTER — HOSPITAL ENCOUNTER (INPATIENT)
Facility: HOSPITAL | Age: 72
LOS: 6 days | Discharge: HOME | DRG: 304 | End: 2017-08-25
Attending: EMERGENCY MEDICINE | Admitting: FAMILY MEDICINE
Payer: COMMERCIAL

## 2017-08-18 ENCOUNTER — APPOINTMENT (OUTPATIENT)
Dept: RADIOLOGY | Facility: HOSPITAL | Age: 72
DRG: 304 | End: 2017-08-18
Payer: COMMERCIAL

## 2017-08-18 DIAGNOSIS — R42 DIZZINESS: Primary | ICD-10-CM

## 2017-08-18 DIAGNOSIS — I10 HYPERTENSION: ICD-10-CM

## 2017-08-18 PROBLEM — I16.9 HYPERTENSIVE CRISIS: Status: RESOLVED | Noted: 2017-08-02 | Resolved: 2017-08-18

## 2017-08-18 PROBLEM — R79.89 POSITIVE D DIMER: Status: RESOLVED | Noted: 2017-08-13 | Resolved: 2017-08-18

## 2017-08-18 PROBLEM — R91.1 NODULE OF LEFT LUNG: Status: RESOLVED | Noted: 2017-08-13 | Resolved: 2017-08-18

## 2017-08-18 PROBLEM — I16.0 HYPERTENSIVE URGENCY: Status: ACTIVE | Noted: 2017-08-18

## 2017-08-18 PROBLEM — R07.9 CHEST PAIN: Status: RESOLVED | Noted: 2017-08-13 | Resolved: 2017-08-18

## 2017-08-18 PROBLEM — E87.6 HYPOKALEMIA: Status: RESOLVED | Noted: 2017-08-02 | Resolved: 2017-08-18

## 2017-08-18 LAB
ANION GAP SERPL CALCULATED.3IONS-SCNC: 10 MMOL/L (ref 4–13)
BACTERIA UR QL AUTO: ABNORMAL /HPF
BILIRUB UR QL STRIP: NEGATIVE
BUN SERPL-MCNC: 11 MG/DL (ref 5–25)
CALCIUM SERPL-MCNC: 9.8 MG/DL (ref 8.3–10.1)
CHLORIDE SERPL-SCNC: 103 MMOL/L (ref 100–108)
CLARITY UR: CLEAR
CO2 SERPL-SCNC: 30 MMOL/L (ref 21–32)
COLOR UR: YELLOW
CREAT SERPL-MCNC: 1.02 MG/DL (ref 0.6–1.3)
GFR SERPL CREATININE-BSD FRML MDRD: 63 ML/MIN/1.73SQ M
GLUCOSE SERPL-MCNC: 113 MG/DL (ref 65–140)
GLUCOSE UR STRIP-MCNC: NEGATIVE MG/DL
HGB UR QL STRIP.AUTO: ABNORMAL
KETONES UR STRIP-MCNC: NEGATIVE MG/DL
LEUKOCYTE ESTERASE UR QL STRIP: NEGATIVE
MAGNESIUM SERPL-MCNC: 1.8 MG/DL (ref 1.6–2.6)
NITRITE UR QL STRIP: NEGATIVE
NON-SQ EPI CELLS URNS QL MICRO: ABNORMAL /HPF
PH UR STRIP.AUTO: 6 [PH] (ref 4.5–8)
POTASSIUM SERPL-SCNC: 3.5 MMOL/L (ref 3.5–5.3)
PROT UR STRIP-MCNC: NEGATIVE MG/DL
RBC #/AREA URNS AUTO: ABNORMAL /HPF
SODIUM SERPL-SCNC: 143 MMOL/L (ref 136–145)
SP GR UR STRIP.AUTO: 1.01 (ref 1–1.03)
TSH SERPL DL<=0.05 MIU/L-ACNC: 2.31 UIU/ML (ref 0.36–3.74)
UROBILINOGEN UR QL STRIP.AUTO: 0.2 E.U./DL
WBC #/AREA URNS AUTO: ABNORMAL /HPF

## 2017-08-18 PROCEDURE — 83735 ASSAY OF MAGNESIUM: CPT | Performed by: FAMILY MEDICINE

## 2017-08-18 PROCEDURE — 96374 THER/PROPH/DIAG INJ IV PUSH: CPT

## 2017-08-18 PROCEDURE — 93975 VASCULAR STUDY: CPT

## 2017-08-18 PROCEDURE — 36415 COLL VENOUS BLD VENIPUNCTURE: CPT | Performed by: EMERGENCY MEDICINE

## 2017-08-18 PROCEDURE — G8979 MOBILITY GOAL STATUS: HCPCS

## 2017-08-18 PROCEDURE — 80048 BASIC METABOLIC PNL TOTAL CA: CPT | Performed by: EMERGENCY MEDICINE

## 2017-08-18 PROCEDURE — 99284 EMERGENCY DEPT VISIT MOD MDM: CPT

## 2017-08-18 PROCEDURE — 84443 ASSAY THYROID STIM HORMONE: CPT | Performed by: FAMILY MEDICINE

## 2017-08-18 PROCEDURE — G8978 MOBILITY CURRENT STATUS: HCPCS

## 2017-08-18 PROCEDURE — 93880 EXTRACRANIAL BILAT STUDY: CPT

## 2017-08-18 PROCEDURE — 97163 PT EVAL HIGH COMPLEX 45 MIN: CPT

## 2017-08-18 PROCEDURE — 81001 URINALYSIS AUTO W/SCOPE: CPT | Performed by: FAMILY MEDICINE

## 2017-08-18 PROCEDURE — 71020 HB CHEST X-RAY 2VW FRONTAL&LATL: CPT

## 2017-08-18 RX ORDER — LABETALOL 100 MG/1
200 TABLET, FILM COATED ORAL EVERY 12 HOURS SCHEDULED
Status: DISCONTINUED | OUTPATIENT
Start: 2017-08-18 | End: 2017-08-19

## 2017-08-18 RX ORDER — ASPIRIN 81 MG/1
81 TABLET, CHEWABLE ORAL DAILY
Status: DISCONTINUED | OUTPATIENT
Start: 2017-08-18 | End: 2017-08-25 | Stop reason: HOSPADM

## 2017-08-18 RX ORDER — PANTOPRAZOLE SODIUM 20 MG/1
20 TABLET, DELAYED RELEASE ORAL
Status: DISCONTINUED | OUTPATIENT
Start: 2017-08-18 | End: 2017-08-20

## 2017-08-18 RX ORDER — ACETAMINOPHEN 325 MG/1
650 TABLET ORAL EVERY 6 HOURS PRN
Status: DISCONTINUED | OUTPATIENT
Start: 2017-08-18 | End: 2017-08-25 | Stop reason: HOSPADM

## 2017-08-18 RX ORDER — CARVEDILOL 12.5 MG/1
12.5 TABLET ORAL 2 TIMES DAILY WITH MEALS
Status: DISCONTINUED | OUTPATIENT
Start: 2017-08-18 | End: 2017-08-19

## 2017-08-18 RX ORDER — HYDRALAZINE HYDROCHLORIDE 20 MG/ML
10 INJECTION INTRAMUSCULAR; INTRAVENOUS EVERY 4 HOURS PRN
Status: DISCONTINUED | OUTPATIENT
Start: 2017-08-18 | End: 2017-08-25 | Stop reason: HOSPADM

## 2017-08-18 RX ORDER — CHLORTHALIDONE 25 MG/1
25 TABLET ORAL DAILY
Status: DISCONTINUED | OUTPATIENT
Start: 2017-08-19 | End: 2017-08-18 | Stop reason: SDUPTHER

## 2017-08-18 RX ORDER — LABETALOL HYDROCHLORIDE 5 MG/ML
10 INJECTION, SOLUTION INTRAVENOUS ONCE
Status: COMPLETED | OUTPATIENT
Start: 2017-08-18 | End: 2017-08-18

## 2017-08-18 RX ORDER — CHLORTHALIDONE 25 MG/1
25 TABLET ORAL DAILY
Status: DISCONTINUED | OUTPATIENT
Start: 2017-08-18 | End: 2017-08-25 | Stop reason: HOSPADM

## 2017-08-18 RX ADMIN — ASPIRIN 81 MG 81 MG: 81 TABLET ORAL at 09:49

## 2017-08-18 RX ADMIN — HYDRALAZINE HYDROCHLORIDE 10 MG: 20 INJECTION INTRAMUSCULAR; INTRAVENOUS at 23:42

## 2017-08-18 RX ADMIN — PANTOPRAZOLE SODIUM 20 MG: 20 TABLET, DELAYED RELEASE ORAL at 09:49

## 2017-08-18 RX ADMIN — CHLORTHALIDONE 25 MG: 25 TABLET ORAL at 09:49

## 2017-08-18 RX ADMIN — LABETALOL HYDROCHLORIDE 200 MG: 100 TABLET, FILM COATED ORAL at 20:56

## 2017-08-18 RX ADMIN — LABETALOL HYDROCHLORIDE 10 MG: 5 INJECTION, SOLUTION INTRAVENOUS at 07:35

## 2017-08-18 RX ADMIN — LABETALOL HYDROCHLORIDE 200 MG: 100 TABLET, FILM COATED ORAL at 09:28

## 2017-08-18 NOTE — H&P
History and Physical - [facility] Internal Medicine    Patient Information: Dany Noonan 66 y o  female MRN: [id number]  Unit/Bed#: -01 Encounter: [id number]  Admitting Physician: Eddie Chaudhry MD  PCP: No primary care provider on file  Date of Admission:  08/18/17    Assessment/Plan:    Hospital Problem List:     Principal Problem:    Hypertensive urgency  Active Problems:    Dizziness      Plan for the Primary Problem(s):  · Hypertensive urgency  · Present on admission, Cardiology saw patient in ED  · Patient intolerant of Coreg was switched to labetalol  · Continue chlorthalidone and potassium supplement  · Check ultrasound tto rule out renal artery stenosis  · Thyroid function normal    Plan for Additional Problems:   · Dizziness  Most likely secondary to beta-blocker, patient switched to a different medication from same class  Monitor for response in the possible side effects  Check morning lab work, potassium is normal  Check carotid ultrasound    VTE Prophylaxis: Patient admitted for observation anticipated length of stay less than  / sequential compression device and reason for no mechanical VTE prophylaxis Patient ambulatory   Code Status: full  POLST: There is no POLST form on file for this patient (pre-hospital)    Anticipated Length of Stay:  Patient will be admitted on an Observation basis with an anticipated length of stay of  Less than 2 midnights  Justification for Hospital Stay:  Observation for blood pressure control and medication adjustment    Total Time for Visit, including Counseling / Coordination of Care: 45 minutes  Greater than 50% of this total time spent on direct patient counseling and coordination of care      Chief Complaint:   Dizziness    History of Present Illness:    Dany Noonan is a 66 y o  female with past medical history pertinent for hypertension, recently not controlled, discharged from our service earlier this week who presents with significant dizziness, progressively worse over night and this morning  She has been struggling with dizziness since she started Coreg  She has had problems with hypertension since she was 42years old  She admits that she has been inconsistent with her potassium supplement at home  She denies any recent fall, head injury, focal neurological deficits like arm or leg weakness, visual disturbance, speech difficulty, isolated numbness  All reviewed prior admission and discharge  Patient was evaluated in the ED, she was given IV labetalol, her blood pressure has not trended down  Cardiology was called for evaluation, advised admission for observation  Coreg was stopped, patient started on oral labetalol  Currently she is complaining of feeling fatigued and in patient with the medication adjustment process    Review of Systems:    Review of Systems   Constitutional: Positive for activity change and fatigue  HENT: Negative  Eyes: Negative  Respiratory: Negative  Cardiovascular: Negative  Gastrointestinal: Negative for abdominal pain  Endocrine: Negative  Genitourinary: Negative  Musculoskeletal: Negative  Skin: Negative  Neurological: Positive for dizziness, weakness and light-headedness  Negative for tremors, syncope, facial asymmetry, speech difficulty, numbness and headaches  Hematological: Negative  Psychiatric/Behavioral: Positive for sleep disturbance  All other systems reviewed and are negative  Past Medical and Surgical History:     Past Medical History:   Diagnosis Date    Hypertension        Past Surgical History:   Procedure Laterality Date    BREAST CYST EXCISION         Meds/Allergies:    Prior to Admission medications    Medication Sig Start Date End Date Taking?  Authorizing Provider   aspirin 81 mg chewable tablet Chew 81 mg daily 9/20/12  Yes Historical Provider, MD   carvedilol (COREG) 25 mg tablet Take 0 5 tablets by mouth 2 (two) times a day with meals 8/15/17  Yes Shahnaz Reeves MD   chlorthalidone 25 mg tablet Take 1 tablet by mouth daily 8/5/17  Yes Lyndsey Siddiqui MD   magnesium oxide (MAG-OX) 400 mg Take 1 tablet by mouth daily for 5 days 8/5/17 8/18/17 Yes Lyndsey Siddiqui MD   omeprazole (PriLOSEC) 20 mg delayed release capsule Take 20 mg by mouth daily 4/10/17  Yes Historical Provider, MD   potassium chloride (K-DUR,KLOR-CON) 20 mEq tablet Take 1 tablet by mouth 2 (two) times a day for 20 doses 8/5/17 8/18/17 Yes Lyndsey Siddiqui MD     I have reviewed home medications with patient personally  Allergies: No Known Allergies    Social History:     Marital Status: Single   Occupation:  Retired  Patient Pre-hospital Living Situation:  Home with daughter  Patient Pre-hospital Level of Mobility:  Independent  Patient Pre-hospital Diet Restrictions:  Low sodium  Substance Use History:   History   Alcohol Use    Yes     Comment: Socially     History   Smoking Status    Never Smoker   Smokeless Tobacco    Not on file     History   Drug Use No       Family History:    Family History   Problem Relation Age of Onset    Family history unknown: Yes       Physical Exam:     Vitals:   Blood Pressure: 130/70 (08/18/17 1040)  Pulse: 63 (08/18/17 1040)  Temperature: 98 7 °F (37 1 °C) (08/18/17 1040)  Temp Source: Oral (08/18/17 1040)  Respirations: 18 (08/18/17 1040)  Weight - Scale: 90 7 kg (200 lb) (08/18/17 0713)  SpO2: 98 % (08/18/17 1040)    Physical Exam   Constitutional: She is oriented to person, place, and time  She appears well-developed and well-nourished  No distress  Appears mildly fatigued   HENT:   Head: Normocephalic and atraumatic  Mouth/Throat: Oropharynx is clear and moist    Eyes: Conjunctivae and EOM are normal  Pupils are equal, round, and reactive to light  Neck: Normal range of motion  Neck supple  No JVD present  No thyromegaly present     Cardiovascular: Normal rate, regular rhythm, normal heart sounds and intact distal pulses  No murmur heard  Pulmonary/Chest: Effort normal and breath sounds normal    Abdominal: Soft  Bowel sounds are normal    Musculoskeletal: Normal range of motion  She exhibits no edema  Neurological: She is alert and oriented to person, place, and time  No cranial nerve deficit or sensory deficit  She exhibits normal muscle tone  Coordination normal  GCS eye subscore is 4  GCS verbal subscore is 5  GCS motor subscore is 6  Skin: Skin is warm and dry  She is not diaphoretic  Psychiatric: She has a normal mood and affect  Her behavior is normal  Judgment and thought content normal    Nursing note and vitals reviewed  Additional Data:     Lab Results: I have personally reviewed pertinent reports  and I have personally reviewed pertinent films in PACS      Results from last 7 days  Lab Units 08/14/17  0515  08/13/17  1911   WBC Thousand/uL 4 90  --  5 05   HEMOGLOBIN g/dL 11 7  --  12 8   HEMATOCRIT % 36 7  --  39 7   PLATELETS Thousands/uL 166  < > 170   NEUTROS PCT %  --   --  51   LYMPHS PCT %  --   --  35   MONOS PCT %  --   --  11   EOS PCT %  --   --  3   < > = values in this interval not displayed  Results from last 7 days  Lab Units 08/18/17  0728  08/13/17  1911   SODIUM mmol/L 143  < > 141   POTASSIUM mmol/L 3 5  < > 3 3*   CHLORIDE mmol/L 103  < > 103   CO2 mmol/L 30  < > 32   BUN mg/dL 11  < > 21   CREATININE mg/dL 1 02  < > 1 41*   CALCIUM mg/dL 9 8  < > 9 7   TOTAL PROTEIN g/dL  --   --  7 6   BILIRUBIN TOTAL mg/dL  --   --  0 70   ALK PHOS U/L  --   --  77   ALT U/L  --   --  24   AST U/L  --   --  22   GLUCOSE RANDOM mg/dL 113  < > 120   < > = values in this interval not displayed  Results from last 7 days  Lab Units 08/14/17  0515   INR  0 96       Imaging: I have personally reviewed pertinent reports  and I have personally reviewed pertinent films in PACS    Xr Chest 2 Views    Result Date: 8/14/2017  Narrative: CHEST INDICATION:  Shortness of breath  Dizziness  COMPARISON:  August 2, 2017 VIEWS:  Frontal and lateral projections IMAGES:  2 FINDINGS:     Cardiomediastinal silhouette appears unremarkable  There is no evidence of focal consolidation, pulmonary edema or pleural effusion  There is suggestion of a nodular density at the level of the diaphragm on the left  Please see separate CT report performed same date  Visualized osseous structures appear within normal limits for the patient's age  Impression: Nodular density left base level of diaphragm  Please see separate CT report performed same date  Workstation performed: [id number]     Xr Chest 2 Views    Result Date: 8/3/2017  Narrative: CHEST INDICATION:  Hypertension and dizziness for 3 weeks  COMPARISON:  None VIEWS:  Frontal and lateral projections IMAGES:  2 FINDINGS:     Cardiomediastinal silhouette appears unremarkable  The lungs are clear  No pneumothorax or pleural effusion  Visualized osseous structures appear within normal limits for the patient's age  Impression: No active pulmonary disease  Workstation performed: [id number]     Ct Head Without Contrast    Result Date: 8/2/2017  Narrative: CT BRAIN - WITHOUT CONTRAST INDICATION:  Dizziness COMPARISON:  None  TECHNIQUE:  CT examination of the brain was performed  In addition to axial images, coronal reformatted images were created and submitted for interpretation  Radiation dose length product (DLP) for this visit:  888 mGy-cm   This examination, like all CT scans performed in the [facility], was performed utilizing techniques to minimize radiation dose exposure, including the use of iterative reconstruction and automated exposure control  IMAGE QUALITY:  Diagnostic  FINDINGS:  PARENCHYMA:  Decreased attenuation is noted in the supratentorial white matter demonstrating an appearance most consistent with mild microangiopathic change  No intracranial mass, mass effect or midline shift  No CT signs of acute infarction    There is no parenchymal hemorrhage  Atherosclerotic calcifications of the cavernous segment of the internal carotid artery are mild  VENTRICLES AND EXTRA-AXIAL SPACES:  Normal for patient's age  VISUALIZED ORBITS AND PARANASAL SINUSES:  Right maxillary sinus and left sphenoidal sinus opacities noted possibly uterus retention cysts and sinus disease  CALVARIUM AND EXTRACRANIAL SOFT TISSUES:   Normal      Impression: No acute intracranial abnormality  Microangiopathic changes  Workstation performed: [id number]     Cta Ed Chest Pe Study    Result Date: 8/13/2017  Narrative: CTA - CHEST WITH IV CONTRAST - PULMONARY ANGIOGRAM INDICATION: "CP, dyspnea, recent travel, elevated d-dimer""prior history of left lower leg phlebitis the patient states she was treated with Coumadin for (patient unaware of diagnosis of DVT and used the word "phlebitis"), presents to the emergency department complaining of lightheadedness, central and left-sided chest pain as well as difficulty taking a deep breath   " COMPARISON: Two-view chest from earlier the same day  TECHNIQUE: CTA examination of the chest was performed using angiographic technique according to a protocol specifically tailored to evaluate for pulmonary embolism  Reformatted images were created in axial, sagittal, and coronal planes  In addition, coronal 3D MIP postprocessing was performed on the acquisition scanner  Radiation dose length product (DLP) for this visit:  528 mGy-cm   This examination, like all CT scans performed in the [facility], was performed utilizing techniques to minimize radiation dose exposure, including the use of iterative reconstruction and automated exposure control  IV Contrast:  85 mL of iodixanol (VISIPAQUE)  320  FINDINGS: PULMONARY ARTERIAL TREE:  No pulmonary embolus is seen  LUNGS:  Discoid left lower lobe nodule measuring 10 mm x 5 mm  4 mm right upper lobe nodule series 3 image 27    Partially calcified right middle lobe nodule in keeping with a granuloma  No pulmonary consolidation  No endotracheal or endobronchial lesion  PLEURA:  Unremarkable  HEART/AORTA:  Cardiomegaly  No thoracic aortic aneurysm  MEDIASTINUM AND BRANDO:  Unremarkable  CHEST WALL AND LOWER NECK:       Unremarkable  VISUALIZED STRUCTURES IN THE UPPER ABDOMEN:  Partially imaged large right renal cyst with internal septation measuring at least 9 3 cm  OSSEOUS STRUCTURES:  No acute fracture or destructive osseous lesion  Impression: No pulmonary arterial embolism or acute pulmonary findings  Dominant 10 mm left lower lobe nodule  Consider follow-up with PET scan if this has not been previously characterized  ##imslh##imslh       Workstation performed: [id number]       EKG, Pathology, and Other Studies Reviewed on Admission:   · EKG:  Normal sinus rhythm, no acute ST changes    Allscripts Records Reviewed: Yes     ** Please Note: Dragon 360 Dictation voice to text software may have been used in the creation of this document   **

## 2017-08-18 NOTE — ED PROVIDER NOTES
History  Chief Complaint   Patient presents with    Hypertension     Pt c/o high blood pressure this am with dizziness  Pt states she was recently seen in the ED for the same  66 y o  female presents with 2 hours of high blood pressure associated with dizziness that the patient describes as a feeling of lightheadedness  The patient describes mild gradual symptoms that are continuous and continue in the emergency department  The patient states that standing worsens the symptoms and her antihypertensives typically improve these symptoms  She states the symptoms occur whenever her blood pressure is high  The patient is very concerned because she takes her blood pressure repeatedly at home  The patient was recently admitted and her medications were adjusted as her blood pressure was noted to be low on her previous dose of medication  The patient states that she has been compliant with her medications and took them this morning at approximately 0400 hours, including her chlorthalidone and her carvedilol  ROS: the patient denies any diplopia, dysarthria, gait abnormalities, focal neurologic deficits, headache, tinnitus, hearing loss, chest pain, palpitations, dyspnea   The patient affirms prior history of these symptoms whenever her blood pressure is elevated  The patient denies any history of CVA or inner ear medical issues  The patient denies any recent illness  The patient denies any recent trauma  Objective:   PHYSICAL EXAM:   Constitutional : Non-toxic  Well developed, well-nourished with no acute distress   Eyes: PERRL, EOMI  No resting nystagmus or with gaze fixation  HENT: Atraumatic  Neck: no carotid bruit, no tenderness to palpitation   CV: Regular rate and rhythm, no murmur  Peripheral pulses intact   Respiratory: Lungs clear to auscultation bilaterally   Abdomen: Soft, non-tender, non-distended     Back: No vertebral tenderness   Skin: Normal color, warm and dry   Extremities: Non-tender, no pedal edema  Neuro: Alert and answers questions appropriately  Normal gait  Cranial nerves II through XII grossly intact  Upper and lower motor strength 5/5 and symmetric  Normal light touch sensory exam  Normal DTRs  MDM   73-year-old female with hypertension associated with symptoms consistent with lightheadedness  Patient's medication recently adjusted due to low blood pressure during her previous hospitalization  Patient notably had acute kidney injury during her previous hospital admission  Will check BMP to evaluate potential for recurrence  Will monitor in the emergency department  During prior visit, patient was complaining of chest pain, she denies any chest pain or cardiac symptoms at present  Patient has follow-up appointment with her new primary care physician next week, will attempt to see if we can expedite these findings and re-evaluate patient while in the emergency department  Hypertension       Prior to Admission Medications   Prescriptions Last Dose Informant Patient Reported?  Taking?   aspirin 81 mg chewable tablet 8/17/2017 at Unknown time  Yes Yes   Sig: Chew 81 mg daily   carvedilol (COREG) 25 mg tablet 8/18/2017 at Unknown time  No Yes   Sig: Take 0 5 tablets by mouth 2 (two) times a day with meals   chlorthalidone 25 mg tablet 8/17/2017 at Unknown time  No Yes   Sig: Take 1 tablet by mouth daily   magnesium oxide (MAG-OX) 400 mg 8/17/2017 at Unknown time  No Yes   Sig: Take 1 tablet by mouth daily for 5 days   omeprazole (PriLOSEC) 20 mg delayed release capsule 8/17/2017 at Unknown time  Yes Yes   Sig: Take 20 mg by mouth daily   potassium chloride (K-DUR,KLOR-CON) 20 mEq tablet 8/17/2017 at Unknown time  No Yes   Sig: Take 1 tablet by mouth 2 (two) times a day for 20 doses      Facility-Administered Medications: None       Past Medical History:   Diagnosis Date    Hypertension        Past Surgical History:   Procedure Laterality Date    BREAST CYST EXCISION         Family History   Problem Relation Age of Onset    Family history unknown: Yes     I have reviewed and agree with the history as documented  Social History   Substance Use Topics    Smoking status: Never Smoker    Smokeless tobacco: Not on file    Alcohol use Yes      Comment: Socially        Review of Systems   All other systems reviewed and are negative  Physical Exam  ED Triage Vitals   Temperature Pulse Respirations Blood Pressure SpO2   08/18/17 0713 08/18/17 0709 08/18/17 0709 08/18/17 0709 08/18/17 0709   97 7 °F (36 5 °C) 80 22 (!) 211/100 97 %      Temp Source Heart Rate Source Patient Position BP Location FiO2 (%)   08/18/17 0713 08/18/17 0709 08/18/17 0709 08/18/17 0709 --   Oral Monitor Sitting Right arm       Pain Score       08/18/17 0713       No Pain           Physical Exam    ED Medications  Medications   labetalol (NORMODYNE) tablet 200 mg (200 mg Oral Given 8/19/17 0909)   chlorthalidone tablet 25 mg (25 mg Oral Given 8/19/17 0910)   aspirin chewable tablet 81 mg (81 mg Oral Given 8/19/17 0910)   pantoprazole (PROTONIX) EC tablet 20 mg (20 mg Oral Given 8/19/17 0509)   acetaminophen (TYLENOL) tablet 650 mg (not administered)   hydrALAZINE (APRESOLINE) injection 10 mg (10 mg Intravenous Given 8/18/17 2342)   labetalol (NORMODYNE) injection 10 mg (10 mg Intravenous Given 8/18/17 0735)   potassium chloride (K-DUR,KLOR-CON) CR tablet 20 mEq (20 mEq Oral Given 8/19/17 0559)   potassium chloride 20 mEq IVPB (premix) (20 mEq Intravenous New Bag 8/19/17 0600)       Diagnostic Studies  Labs Reviewed   TSH, 3RD GENERATION - Normal       Result Value Ref Range Status    TSH 3RD GENERATON 2 309  0 358 - 3 740 uIU/mL Final    Narrative:     Patients undergoing fluorescein dye angiography may retain small amounts of fluorescein in the body for 48-72 hours post procedure  Samples containing fluorescein can produce falsely depressed TSH values   If the patient had this procedure,a specimen should be resubmitted post fluorescein clearance  The recommended reference ranges for TSH during pregnancy are as follows:  First trimester 0 1 to 2 5 uIU/mL  Second trimester  0 2 to 3 0 uIU/mL  Third trimester 0 3 to 3 0 uIU/m     MAGNESIUM - Normal    Magnesium 1 8  1 6 - 2 6 mg/dL Final   BASIC METABOLIC PANEL    Sodium 895  136 - 145 mmol/L Final    Potassium 3 5  3 5 - 5 3 mmol/L Final    Chloride 103  100 - 108 mmol/L Final    CO2 30  21 - 32 mmol/L Final    Anion Gap 10  4 - 13 mmol/L Final    BUN 11  5 - 25 mg/dL Final    Creatinine 1 02  0 60 - 1 30 mg/dL Final    Comment: Standardized to IDMS reference method    Glucose 113  65 - 140 mg/dL Final    Comment:   If the patient is fasting, the ADA then defines impaired fasting glucose as > 100 mg/dL and diabetes as > or equal to 123 mg/dL  Specimen collection should occur prior to Sulfasalazine administration due to the potential for falsely depressed results  Specimen collection should occur prior to Sulfapyridine administration due to the potential for falsely elevated results  Calcium 9 8  8 3 - 10 1 mg/dL Final    eGFR 63  ml/min/1 73sq m Final    Narrative:     National Kidney Disease Education Program recommendations are as follows:  GFR calculation is accurate only with a steady state creatinine  Chronic Kidney disease less than 60 ml/min/1 73 sq  meters  Kidney failure less than 15 ml/min/1 73 sq  meters  VAS carotid complete study   Final Result      VAS renal artery complete   Final Result      XR chest pa & lateral   Final Result      No acute consolidation or congestion seen           Workstation performed: [id number]             Procedures  Procedures      Phone Contacts  ED Phone Contact    ED Course  ED Course                               MDM  CritCare Time    Disposition  Final diagnoses:   Dizziness   Hypertension     ED Disposition     ED Disposition Condition Comment    Admit  Case was discussed with KEV rowland the patient's admission status was agreed to be Admission Status: observation status to the service of Dr Julee Kelsey   Follow-up Information    None       Current Discharge Medication List      CONTINUE these medications which have NOT CHANGED    Details   aspirin 81 mg chewable tablet Chew 81 mg daily      carvedilol (COREG) 25 mg tablet Take 0 5 tablets by mouth 2 (two) times a day with meals  Qty: 60 tablet, Refills: 0      chlorthalidone 25 mg tablet Take 1 tablet by mouth daily  Qty: 30 tablet, Refills: 0      magnesium oxide (MAG-OX) 400 mg Take 1 tablet by mouth daily for 5 days  Qty: 5 tablet, Refills: 0      omeprazole (PriLOSEC) 20 mg delayed release capsule Take 20 mg by mouth daily      potassium chloride (K-DUR,KLOR-CON) 20 mEq tablet Take 1 tablet by mouth 2 (two) times a day for 20 doses  Qty: 20 tablet, Refills: 0           No discharge procedures on file      ED Provider  Electronically Signed by       Chato Huff MD  08/19/17 9768

## 2017-08-18 NOTE — ED NOTES
Sally  at [facility], [facility]  08/18/17 7085

## 2017-08-18 NOTE — CONSULTS
Consultation - Cardiology  Tad Kaye 66 y o  female MRN: [id number]  Unit/Bed#: ED 29 Encounter: [id number]    Inpatient consult to Cardiology  Consult performed by: Melba Harkins ordered by: Jl Chase          Physician Requesting Consult: Sergio Ruvalcaba MD  Reason for Consult / Principal Problem: bp    HPI: Cardiologist Dr Abbasi Nick is a 66y o  year old female who has a history of HTN, GERD  Pt came to ED with complaints of dizziness  Pt states she woke up to go to the bathroom and felt dizzy  She went to the bathroom and then took her BP, it was 322 systolic  She states she then began taking it a lot  She states she came to the hospital for evaluation  She was just  Here and discharged on 8/15/17 for similar complaints  s he states that whenever she take the coreg she feels poorly, she would like it to be switched  She denies chest pain, pressure, heaviness or shortness of breath       REVIEW OF SYSTEMS:  Constitutional:  Denies fever or chills   Eyes:  Denies change in visual acuity   HENT:  Denies nasal congestion or sore throat   Respiratory:  Denies cough or shortness of breath   Cardiovascular:  Denies chest pain or edema   GI:  Denies abdominal pain, nausea, vomiting, bloody stools or diarrhea   :  Denies dysuria, frequency, difficulty in micturition and nocturia  Musculoskeletal:  Denies back pain or joint pain   Neurologic: + dizzines Denies headache, focal weakness or sensory changes   Endocrine:  Denies polyuria or polydipsia   Lymphatic:  Denies swollen glands   Psychiatric:  Denies depression or anxiety     Historical Information   Past Medical History:   Diagnosis Date    Hypertension      Past Surgical History:   Procedure Laterality Date    BREAST CYST EXCISION       History   Alcohol Use    Yes     Comment: Socially     History   Drug Use No     History   Smoking Status    Never Smoker   Smokeless Tobacco    Not on file     Family History: History reviewed  No pertinent family history  MEDS & ALLERGIES:  all current active meds have been reviewed and current meds:   No current facility-administered medications for this encounter  No current facility-administered medications for this encounter  No Known Allergies    OBJECTIVE:  Vitals:   Vitals:    08/18/17 0812   BP: 139/75   Pulse: 56   Resp: 18   Temp:    SpO2: 96%     Body mass index is 34 33 kg/m²  Systolic (50XOG), GXT:145 , Min:139 , QLL:328     Diastolic (30KKY), OZE:58, Min:75, Max:100    No intake or output data in the 24 hours ending 08/18/17 0844  Weight (last 2 days)     Date/Time   Weight    08/18/17 0713  90 7 (200)            Invasive Devices     Peripheral Intravenous Line            Peripheral IV 08/18/17 Right Antecubital less than 1 day                PHYSICAL EXAMS:  General:  Patient is not in acute distress, laying in the bed comfortably, awake, alert responding to commands  Head: Normocephalic, Atraumatic  HEENT:  Both pupils normal-size atraumatic, normocephalic, nonicteric  Neck:  JVP not raised  Trachea central  Respiratory:  Bronchovascular breathing all over the chest without any accompaniment  Cardiovascular:  S1-S2 normal  RRR without any murmur or rub  GI:  Abdomen soft nontender   Liver and spleen normal size  Musculoskeletal:  No clubbing, cyanosis or edema  Integument:  No skin rashes or ulceration  Lymphatic:  No cervical or inguinal lymphadenopathy  Neurologic:  Patient is awake alert, responding to command, well-oriented to time and place and person    LABORATORY RESULTS:    Results from last 7 days  Lab Units 08/14/17  0515 08/14/17 0157 08/13/17  2238   TROPONIN I ng/mL <0 02 <0 02 <0 02     CBC with diff:   Results from last 7 days  Lab Units 08/14/17  0515 08/13/17 2238 08/13/17  1911   WBC Thousand/uL 4 90  --  5 05   HEMOGLOBIN g/dL 11 7  --  12 8   HEMATOCRIT % 36 7  --  39 7   MCV fL 90  --  90   PLATELETS Thousands/uL 166 174 170   MCH pg 28 6 --  29 2   MCHC g/dL 31 9  --  32 2   RDW % 14 8  --  14 9   MPV fL 12 5 12 8* 13 0*   NRBC AUTO /100 WBCs  --   --  0       CMP:  Results from last 7 days  Lab Units 08/18/17  0728 08/15/17  0545 08/14/17  0515 08/13/17  1911   SODIUM mmol/L 143 140 140 141   POTASSIUM mmol/L 3 5 3 5 3 1* 3 3*   CHLORIDE mmol/L 103 105 104 103   CO2 mmol/L 30 30 30 32   ANION GAP mmol/L 10 5 6 6   BUN mg/dL 11 11 15 21   CREATININE mg/dL 1 02 0 93 0 94 1 41*   GLUCOSE RANDOM mg/dL 113 103 106 120   CALCIUM mg/dL 9 8 9 0 8 7 9 7   AST U/L  --   --   --  22   ALT U/L  --   --   --  24   ALK PHOS U/L  --   --   --  77   TOTAL PROTEIN g/dL  --   --   --  7 6   ALBUMIN g/dL  --   --   --  3 7   BILIRUBIN TOTAL mg/dL  --   --   --  0 70   EGFR ml/min/1 73sq m 63 71 70 43       BMP:  Results from last 7 days  Lab Units 08/18/17  0728 08/15/17  0545 08/14/17  0515   SODIUM mmol/L 143 140 140   POTASSIUM mmol/L 3 5 3 5 3 1*   CHLORIDE mmol/L 103 105 104   CO2 mmol/L 30 30 30   BUN mg/dL 11 11 15   CREATININE mg/dL 1 02 0 93 0 94   GLUCOSE RANDOM mg/dL 113 103 106   CALCIUM mg/dL 9 8 9 0 8 7         Results from last 7 days  Lab Units 08/13/17  2238   NT-PRO BNP pg/mL 49        Results from last 7 days  Lab Units 08/13/17  1911   MAGNESIUM mg/dL 1 6               Results from last 7 days  Lab Units 08/14/17  0515   INR  0 96       Lipid Profile:   Lab Results   Component Value Date    CHOL 187 08/14/2017     Lab Results   Component Value Date    HDL 67 (H) 08/14/2017     Lab Results   Component Value Date    LDLCALC 110 (H) 08/14/2017     Lab Results   Component Value Date    TRIG 50 08/14/2017       Cardiac testing:   Results for orders placed during the hospital encounter of 08/02/17   Echo complete with contrast if indicated    Narrative [address] [place], [place] [address]  [phone]    Transthoracic Echocardiogram  2D, M-mode, Doppler, and Color Doppler    Study date: 03-Aug-2017    Patient: Mandi Strong  MR number: [id number]  Account number: [de-identified]  : 1945  Age: 66 years  Gender: Female  Status: Outpatient  Location: Bedside  Height: 64 in  Weight: 205 lb  BP: 116/ 62 mmHg    Indications: Hypertensive heart disease, Assess left ventricular function  Diagnoses: I11 9 - Hypertensive heart disease without heart failure    Sonographer:   Elton Bryant RDCS  Interpreting Physician:  Kavon Mckeon MD  Referring Physician:  Johnathan Norris PA-C  Group:  [facility]    SUMMARY    PROCEDURE INFORMATION:  This was a technically difficult study  Echocardiographic views were limited due to poor acoustic window availability, decreased penetration, and lung interference  LEFT VENTRICLE:  Systolic function was normal  Ejection fraction was estimated to be 60 %  There were no regional wall motion abnormalities  Doppler parameters were consistent with abnormal left ventricular relaxation (grade 1 diastolic dysfunction)  RIGHT VENTRICLE:  The size was normal   Systolic function was normal     TRICUSPID VALVE:  There was mild regurgitation  Pulmonary artery systolic pressure was within the normal range  HISTORY: Dizziness  PRIOR HISTORY: Risk factors: hypertension and morbid obesity  PROCEDURE: The procedure was performed at the bedside  This was a routine study  The transthoracic approach was used  The study included complete 2D imaging, M-mode, complete spectral Doppler, and color Doppler  The heart rate was 63 bpm,  at the start of the study  Images were obtained from the parasternal, apical, and subcostal acoustic windows  Images were not obtained from the suprasternal notch acoustic windows  Echocardiographic views were limited due to poor acoustic  window availability, decreased penetration, and lung interference  This was a technically difficult study      LEFT VENTRICLE: Size was normal  Systolic function was normal  Ejection fraction was estimated to be 60 %  There were no regional wall motion abnormalities  Wall thickness was normal  No evidence of apical thrombus  DOPPLER: Doppler  parameters were consistent with abnormal left ventricular relaxation (grade 1 diastolic dysfunction)  RIGHT VENTRICLE: The size was normal  Systolic function was normal  Wall thickness was normal     LEFT ATRIUM: Size was normal     RIGHT ATRIUM: Size was normal     MITRAL VALVE: Valve structure was normal  There was normal leaflet separation  DOPPLER: The transmitral velocity was within the normal range  There was no evidence for stenosis  There was no significant regurgitation  AORTIC VALVE: The valve was trileaflet  Leaflets exhibited mildly increased thickness and normal cuspal separation  DOPPLER: Transaortic velocity was within the normal range  There was no evidence for stenosis  There was no significant  regurgitation  TRICUSPID VALVE: The valve structure was normal  There was normal leaflet separation  DOPPLER: The transtricuspid velocity was within the normal range  There was no evidence for stenosis  There was mild regurgitation  Pulmonary artery  systolic pressure was within the normal range  PULMONIC VALVE: Leaflets exhibited normal thickness, no calcification, and normal cuspal separation  DOPPLER: The transpulmonic velocity was within the normal range  There was no significant regurgitation  PERICARDIUM: There was no pericardial effusion  The pericardium was normal in appearance  AORTA: The root exhibited normal size  SYSTEMIC VEINS: IVC: The inferior vena cava was normal in size   Respirophasic changes were normal     SYSTEM MEASUREMENT TABLES    2D  %FS: 33 6 %  Ao Diam: 2 6 cm  EDV(Teich): 92 8 ml  EF(Teich): 62 5 %  ESV(Teich): 34 8 ml  IVSd: 0 9 cm  LA Area: 13 4 cm2  LA Diam: 3 1 cm  LVEDV MOD A4C: 105 3 ml  LVEF MOD A4C: 72 2 %  LVESV MOD A4C: 29 2 ml  LVIDd: 4 5 cm  LVIDs: 3 cm  LVLd A4C: 7 3 cm  LVLs A4C: 6 cm  LVPWd: 0 8 cm  RA Area: 12 6 cm2  RVIDd: 3 9 cm  SV MOD A4C: 76 ml  SV(Teich): 58 ml    CW  TR Vmax: 2 8 m/s  TR maxP 8 mmHg    MM  TAPSE: 2 4 cm    PW  E': 0 1 m/s  E/E': 13 7  MV A Darrell: 1 m/s  MV Dec [place]: 2 4 m/s2  MV DecT: 335 1 ms  MV E Darrell: 0 8 m/s  MV E/A Ratio: 0 8  MV PHT: 97 2 ms  MVA By PHT: 2 3 cm2    [employer] Accredited Echocardiography Laboratory    Prepared and electronically signed by    Saira Maradiaga MD  Signed 03-Aug-2017 17:40:08           Imaging: I have personally reviewed pertinent reports  EKG reviewed personally:   none    Telemetry reviewed personally:   Non tele    Assessment/Plan:  1  Hypertensive crisis --  on admission, last 's  Stop coreg, change to labetalol 200mg bid  Restart chlorthalidone  Monitor BP's  Pt had echo (ef 60%, grade 1 DD) and stress test (no ischemia) on last admission, no need for further testing  Code Status: Prior    Thank you for allowing us to participate in this patient's care  This pt will follow up with Dr Earnestine Schumacher once discharged  Counseling / Coordination of Care  Total floor / unit time spent today 35 minutes  Greater than 50% of total time was spent with the patient and / or family counseling and / or coordination of care  A description of the counseling / coordination of care: Review of history, current assessment, development of a plan  David Lawrence PA-C  7486,8:51 AM    Portions of the record may have been created with voice recognition software   Occasional wrong word or "sound a like" substitutions may have occurred due to the inherent limitations of voice recognition software   Read the chart carefully and recognize, using context, where substitutions have occurred

## 2017-08-18 NOTE — SOCIAL WORK
CM met with pt at bedside  OBS status reviewed with pt  She has no questions  Copy to pt and copy to MR for scanning  Pt lives with her daughter Coni Seo in a 2 story house with 3 steps w/railing to enter the residence and 12 steps w/railing to reach her bedroom and bathroom  She has no problem navigating steps and takes care of all ADL's  She has no DME's and feels she has no need for any  She denies hx of anxiety or depression  She denies issues with alcohol or drugs  Has never been in an in-house rehab  Used  OP/PT 10yrs ago following shoulder surgery  Does not remember the name of the facility  She uses [address] in [facility] and has no problems with her co-pays  She has an Advanced Directive and her POA is her daughter [address]  She is retired and does not drive  Her daughter [address] will transport home upon discharge  CM discussed discharge and no needs were identified      Plan:  Home w/no needs anticipated

## 2017-08-18 NOTE — PLAN OF CARE
Problem: DISCHARGE PLANNING - CARE MANAGEMENT  Goal: Discharge to post-acute care or home with appropriate resources  INTERVENTIONS:  - Conduct assessment to determine patient/family and health care team treatment goals, and need for post-acute services based on payer coverage, community resources, and patient preferences, and barriers to discharge  - Address psychosocial, clinical, and financial barriers to discharge as identified in assessment in conjunction with the patient/family and health care team  - Arrange appropriate level of post-acute services according to patient's   needs and preference and payer coverage in collaboration with the physician and health care team  - Communicate with and update the patient/family, physician, and health care team regarding progress on the discharge plan  - Arrange appropriate transportation to post-acute venues  Outcome: Progressing  CM met with pt at bedside  OBS status reviewed with pt  She has no questions  Copy to pt and copy to MR for scanning  Pt lives with her daughter Bernabe Brantley in a 2 story house with 3 steps w/railing to enter the residence and 12 steps w/railing to reach her bedroom and bathroom  She has no problem navigating steps and takes care of all ADL's  She has no DME's and feels she has no need for any  She denies hx of anxiety or depression  She denies issues with alcohol or drugs  Has never been in an in-house rehab  Used  OP/PT 10yrs ago following shoulder surgery  Does not remember the name of the facility  She uses [address] in [facility] and has no problems with her co-pays  She has an Advanced Directive and her POA is her daughter Gerda Lopez  She is retired and does not drive  Her daughter Gerda Lopez will transport home upon discharge  CM discussed discharge and no needs were identified      Plan:  Home w/no needs anticipated

## 2017-08-18 NOTE — PHYSICAL THERAPY NOTE
Physical Therapy Evaluation    Patient's Name: Yenifer Jorge    Admitting Diagnosis  Dizziness [R42]  Hypertension [I10]    Problem List  Patient Active Problem List   Diagnosis    Dizziness    Hypertension    Nodule of left lung    Uncontrolled hypertension       Past Medical History  Past Medical History:   Diagnosis Date    Hypertension        Past Surgical History  Past Surgical History:   Procedure Laterality Date    BREAST CYST EXCISION        08/18/17 1046   Note Type   Note type Eval only   Pain Assessment   Pain Assessment No/denies pain   Pain Score No Pain   Home Living   Type of [facility] [address] Two level;Bed/bath upstairs;Stairs to enter with rails  (3 COURTNEY; 11-12 steps to 2nd level)   Bathroom Shower/Tub Tub/shower unit   [address] [place] chair   [address] (no AD at baseline)   Prior Function   Level of [place] Independent with ADLs and functional mobility   Lives With Daughter   Receives Help From [facility] in the last 6 months 0   Vocational Retired   Comments pt does not drive   Restrictions/Precautions   Wells Denisa Bearing Precautions Per Order No   Braces or Orthoses (none per pt)   Other Precautions Fall Risk   General   Family/Caregiver Present No   Cognition   Overall Cognitive Status WFL   Arousal/Participation Alert   Orientation Level Oriented X4   Memory Within functional limits   Following Commands Follows all commands and directions without difficulty   RUE Assessment   RUE Assessment WFL   LUE Assessment   LUE Assessment WFL   RLE Assessment   RLE Assessment WFL   LLE Assessment   LLE Assessment WFL   Coordination   Movements are Fluid and Coordinated 1   Sensation WFL   Bed Mobility   Supine to Sit 5  Supervision   Sit to Supine 5  Supervision   Transfers   Sit to Stand 5  Supervision   Stand to Sit 5  Supervision   Additional items Verbal cues   Toilet transfer 5 Supervision   Additional items Assist x 1;Standard toilet;Verbal cues   Ambulation/Elevation   Gait pattern WNL  (minor lateral veering, self corrects)   Gait Assistance 5  Supervision   Additional items Assist x 1;Verbal cues   Assistive Device None   Distance 200'   Stair Management Assistance 5  Supervision   Additional items Assist x 1;Verbal cues   Stair Management Technique One rail R;Alternating pattern; Foreward   Number of Stairs 6   Balance   Static Sitting Normal   Dynamic Sitting Good   Static Standing Good   Dynamic Standing Fair +   Ambulatory Fair +  (DGI = 22/24)   Endurance Deficit   Endurance Deficit Yes   Activity Tolerance   Activity Tolerance Patient tolerated treatment well   Nurse Made Aware Yes, LEANN Alaniz verbalized pt appropriate for PT session, made aware of outcomes   Assessment   Prognosis Good   Problem List Decreased endurance; Impaired balance;Decreased mobility   Assessment Pt is 66 y o  female seen for PT evaluation on 8/18/2017 s/p admit to [facility] on 8/18/2017 w/ Uncontrolled hypertension; pt presented to ED w/ symptoms consistent w/ lightheadedness  PT consulted to assess pt's functional mobility and d/c needs  Order placed for PT eval and tx  Comorbidities affecting pt's physical performance at time of assessment include: NADEEN, HTN  PTA, pt was independent w/ all functional mobility w/ no AD or DME, ambulates community distances and elevations, has 3 COURTNEY, lives w/ dtr in 2 level home and retired  Personal factors affecting pt at time of IE include: lives in 2 story house and stairs to enter home  Please find objective findings from PT assessment regarding body systems outlined above with impairments and limitations including impaired balance, decreased endurance and fall risk, as well as mobility assessment (supervision required for all phases of functional mobility at this time)   The following objective measures performed on IE also reveal limitations: Barthel Index: 85/100 and Modified Donnellson: 2 (slight disability)  Pt to benefit from continued PT tx to address deficits as defined above and maximize level of functional independent mobility and consistency  From PT/mobility standpoint, recommendation at time of d/c would be anticipate no needs pending progress in order to facilitate return to PLOF  Barriers to Discharge None   Goals   Patient Goals "to return home"   [facility] Expiration Date 08/25/17   Short Term Goal #1 In 5-7 days: Perform all transfers independently to improve independence, Ambulate > 250 ft  with least restrictive assistive device independently w/o LOB and w/ normalized gait pattern 100% of the time, Navigate 12 stairs independently with unilateral handrail to facilitate return to previous living environment, Increase all balance 1/2 grade to decrease risk for falls and Complete exercise program independently   Treatment Day 0   Plan   Treatment/Interventions Functional transfer training;Elevations; Therapeutic exercise; Endurance training;Patient/family training;Spoke to nursing   PT Frequency 2-3x/wk   Recommendation   Recommendation (anticipate no needs)   Equipment Recommended (none at this time)   PT - OK to Discharge Yes  (when medically cleared)   Modified Donnellson Scale   Modified Donnellson Scale 2   Barthel Index   Feeding 10   Bathing 5   Grooming Score 5   Dressing Score 10   Bladder Score 10   Bowels Score 10   Toilet Use Score 10   Transfers (Bed/Chair) Score 10   Mobility (Level Surface) Score 10   Stairs Score 5   Barthel Index Score 85           Charla Santana, PT

## 2017-08-19 PROBLEM — I10 HTN (HYPERTENSION): Status: ACTIVE | Noted: 2017-08-19

## 2017-08-19 LAB
ANION GAP SERPL CALCULATED.3IONS-SCNC: 7 MMOL/L (ref 4–13)
BUN SERPL-MCNC: 10 MG/DL (ref 5–25)
CALCIUM SERPL-MCNC: 9.5 MG/DL (ref 8.3–10.1)
CHLORIDE SERPL-SCNC: 102 MMOL/L (ref 100–108)
CO2 SERPL-SCNC: 30 MMOL/L (ref 21–32)
CREAT SERPL-MCNC: 0.91 MG/DL (ref 0.6–1.3)
GFR SERPL CREATININE-BSD FRML MDRD: 72 ML/MIN/1.73SQ M
GLUCOSE P FAST SERPL-MCNC: 116 MG/DL (ref 65–99)
GLUCOSE SERPL-MCNC: 116 MG/DL (ref 65–140)
POTASSIUM SERPL-SCNC: 2.6 MMOL/L (ref 3.5–5.3)
POTASSIUM SERPL-SCNC: 3 MMOL/L (ref 3.5–5.3)
SODIUM SERPL-SCNC: 139 MMOL/L (ref 136–145)

## 2017-08-19 PROCEDURE — 84132 ASSAY OF SERUM POTASSIUM: CPT | Performed by: FAMILY MEDICINE

## 2017-08-19 PROCEDURE — 80048 BASIC METABOLIC PNL TOTAL CA: CPT | Performed by: FAMILY MEDICINE

## 2017-08-19 RX ORDER — ONDANSETRON 2 MG/ML
4 INJECTION INTRAMUSCULAR; INTRAVENOUS EVERY 4 HOURS PRN
Status: DISCONTINUED | OUTPATIENT
Start: 2017-08-19 | End: 2017-08-25 | Stop reason: HOSPADM

## 2017-08-19 RX ORDER — POTASSIUM CHLORIDE 14.9 MG/ML
20 INJECTION INTRAVENOUS ONCE
Status: COMPLETED | OUTPATIENT
Start: 2017-08-19 | End: 2017-08-21

## 2017-08-19 RX ORDER — SCOLOPAMINE TRANSDERMAL SYSTEM 1 MG/1
1 PATCH, EXTENDED RELEASE TRANSDERMAL
Status: DISCONTINUED | OUTPATIENT
Start: 2017-08-19 | End: 2017-08-23

## 2017-08-19 RX ORDER — ONDANSETRON 2 MG/ML
INJECTION INTRAMUSCULAR; INTRAVENOUS
Status: COMPLETED
Start: 2017-08-19 | End: 2017-08-19

## 2017-08-19 RX ORDER — POTASSIUM CHLORIDE 20 MEQ/1
20 TABLET, EXTENDED RELEASE ORAL ONCE
Status: COMPLETED | OUTPATIENT
Start: 2017-08-19 | End: 2017-08-19

## 2017-08-19 RX ORDER — LABETALOL 100 MG/1
400 TABLET, FILM COATED ORAL EVERY 12 HOURS SCHEDULED
Status: DISCONTINUED | OUTPATIENT
Start: 2017-08-19 | End: 2017-08-25 | Stop reason: HOSPADM

## 2017-08-19 RX ORDER — POTASSIUM CHLORIDE 29.8 MG/ML
40 INJECTION INTRAVENOUS ONCE
Status: DISCONTINUED | OUTPATIENT
Start: 2017-08-19 | End: 2017-08-19

## 2017-08-19 RX ORDER — POTASSIUM CHLORIDE 14.9 MG/ML
20 INJECTION INTRAVENOUS ONCE
Status: COMPLETED | OUTPATIENT
Start: 2017-08-19 | End: 2017-08-20

## 2017-08-19 RX ADMIN — ONDANSETRON 4 MG: 2 INJECTION INTRAMUSCULAR; INTRAVENOUS at 20:24

## 2017-08-19 RX ADMIN — PANTOPRAZOLE SODIUM 20 MG: 20 TABLET, DELAYED RELEASE ORAL at 05:09

## 2017-08-19 RX ADMIN — LABETALOL HYDROCHLORIDE 200 MG: 100 TABLET, FILM COATED ORAL at 09:09

## 2017-08-19 RX ADMIN — CHLORTHALIDONE 25 MG: 25 TABLET ORAL at 09:10

## 2017-08-19 RX ADMIN — POTASSIUM CHLORIDE 20 MEQ: 1500 TABLET, EXTENDED RELEASE ORAL at 05:59

## 2017-08-19 RX ADMIN — POTASSIUM CHLORIDE 20 MEQ: 200 INJECTION, SOLUTION INTRAVENOUS at 06:00

## 2017-08-19 RX ADMIN — SCOPALAMINE 1 PATCH: 1 PATCH, EXTENDED RELEASE TRANSDERMAL at 15:27

## 2017-08-19 RX ADMIN — ASPIRIN 81 MG 81 MG: 81 TABLET ORAL at 09:10

## 2017-08-19 RX ADMIN — POTASSIUM CHLORIDE 20 MEQ: 200 INJECTION, SOLUTION INTRAVENOUS at 21:15

## 2017-08-19 RX ADMIN — LABETALOL HYDROCHLORIDE 400 MG: 100 TABLET, FILM COATED ORAL at 20:17

## 2017-08-19 NOTE — CASE MANAGEMENT
Notification of Observation Care Status/Observation Authorization Request  This is a Notification of Observation Care Status to our [facility] Please be advised that this patient is currently in our facility under Observation Status  Below you will find the Attending Physician and Facilitys information including NPI# and contact information for the Utilization  assigned to the [facility] where the patient is receiving services  Please feel free to contact the Utilization Review Department with any questions  Patient Information:  PATIENT NAME: Alberta Joel  MRN: [id number]  YOB: 1945    PRESENTATION DATE/TIME: 8/18/2017  7:06 AM  OBS ADMISSION DATE/TIME: 08/18/17 0929  DISCHARGE DATE/TIME: No discharge date for patient encounter  DISPOSITION: Home/Self Care    Attending Physician:  ROSI Thayer  [facility] Practioner ID- [id number]  [address] [place] [address]  Phone 1: [phone]  Fax: [phone]    Facility:  [address] [place] [address] [address] [id number] Tax ID: [id number]  NPI: [id number]    [facility] in the [facility] by [place] for 2017  Network Utilization Review Department  Phone: [phone]; Fax [phone]  ATTENTION: The Network Utilization Review Department is now centralized for our 7 Facilities  Make a note that we have a new phone and fax numbers for our Department  Please call with any questions or concerns to [phone] and carefully follow the prompts so that you are directed to the right person  All voicemails are confidential  Fax any determinations, approvals, denials, and requests for initial or continue stay review clinical to [phone]   Due to HIGH CALL volume, it would be easier if you could please send faxed requests to expedite your requests and in part, help us provide discharge notifications faster

## 2017-08-19 NOTE — SUBJECTIVE & OBJECTIVE
VTE Pharmacologic Prophylaxis:   Pharmacologic: Low risk, patient ambulatory  Mechanical: Mechanical VTE prophylaxis not in place  Patient Centered Rounds: I have performed bedside rounds with nursing staff today  Discussions with Specialists or Other Care Team Provider:  Nephrology, Cardiology  Education and Discussions with Family / Patient:  Patient  Time Spent for Care: 30 minutes  More than 50% of total time spent on counseling and coordination of care as described above  Current Length of Stay: 0 day(s)  Current Patient Status: Inpatient   Certification Statement: The patient, admitted on an observation basis, will now require > 2 midnight hospital stay due to Uncontrolled hypertension, uncontrolled dizziness, hypokalemia    Discharge Plan:  Home when medically cleared by Cardiology Nephrology  Code Status: Level 1 - Full Code    Subjective:   Overnight patient continued with dizziness, and mild right-sided headache the, she states this is not changed from her recently developed baseline  He  Blood pressure trending down  Patient denies any emesis, syncope, extremity weakness, transient numbness, speech difficulty or vision disturbances    Objective:   Vitals:   Temp (24hrs), Av 2 °F (36 8 °C), Min:97 6 °F (36 4 °C), Max:98 7 °F (37 1 °C)    HR:  [59-73] 73  Resp:  [18] 18  BP: (134-174)/(7-100) 156/74  SpO2:  [96 %-97 %] 97 %  Body mass index is 33 76 kg/m²  Input and Output Summary (last 24 hours): Intake/Output Summary (Last 24 hours) at 17 1419  Last data filed at 17 1100   Gross per 24 hour   Intake              420 ml   Output             2650 ml   Net            -2230 ml       Physical Exam:     Physical Exam   Constitutional: She is oriented to person, place, and time  She appears well-developed and well-nourished  She appears distressed  From feeling dizzy sitting at the edge of bed   HENT:   Head: Normocephalic and atraumatic     Mouth/Throat: Oropharynx is clear and moist    Eyes: Conjunctivae and EOM are normal  Pupils are equal, round, and reactive to light  Neck: Normal range of motion  Neck supple  No JVD present  No thyromegaly present  Cardiovascular: Normal rate, regular rhythm, normal heart sounds and intact distal pulses  No murmur heard  Pulmonary/Chest: Effort normal and breath sounds normal    Musculoskeletal: Normal range of motion  She exhibits no edema  Neurological: She is alert and oriented to person, place, and time  She is not disoriented  She displays no tremor  No cranial nerve deficit or sensory deficit  She exhibits normal muscle tone  Skin: Skin is warm and dry  No rash noted  She is not diaphoretic  No pallor  Psychiatric: Her behavior is normal  Judgment and thought content normal  She exhibits a depressed mood  Nursing note and vitals reviewed  Additional Data:   Labs:    Results from last 7 days  Lab Units 08/14/17  0515  08/13/17  1911   WBC Thousand/uL 4 90  --  5 05   HEMOGLOBIN g/dL 11 7  --  12 8   HEMATOCRIT % 36 7  --  39 7   PLATELETS Thousands/uL 166  < > 170   NEUTROS PCT %  --   --  51   LYMPHS PCT %  --   --  35   MONOS PCT %  --   --  11   EOS PCT %  --   --  3   < > = values in this interval not displayed  Results from last 7 days  Lab Units 08/19/17  0507  08/13/17  1911   SODIUM mmol/L 139  < > 141   POTASSIUM mmol/L 2 6*  < > 3 3*   CHLORIDE mmol/L 102  < > 103   CO2 mmol/L 30  < > 32   BUN mg/dL 10  < > 21   CREATININE mg/dL 0 91  < > 1 41*   CALCIUM mg/dL 9 5  < > 9 7   TOTAL PROTEIN g/dL  --   --  7 6   BILIRUBIN TOTAL mg/dL  --   --  0 70   ALK PHOS U/L  --   --  77   ALT U/L  --   --  24   AST U/L  --   --  22   GLUCOSE RANDOM mg/dL 116  < > 120   < > = values in this interval not displayed  Results from last 7 days  Lab Units 08/14/17  0515   INR  0 96       * I Have Reviewed All Lab Data Listed Above  * Additional Pertinent Lab Tests Reviewed:  All Labs Within Last 24 Hours Reviewed    Imaging:    Imaging Reports Reviewed Today Include:  CT head, renal vascular ultrasound, carotid ultrasound    Cultures:   Blood Culture: No results found for: BLOODCX  Urine Culture: No results found for: URINECX  Sputum Culture: No components found for: SPUTUMCX  Wound Culture: No results found for: WOUNDCULT    Last 24 Hours Medication List:     aspirin 81 mg Oral Daily   chlorthalidone 25 mg Oral Daily   labetalol 400 mg Oral Q12H [facility]   pantoprazole 20 mg Oral Early Morning   scopolamine 1 patch Transdermal Q72H        Today, Patient Was Seen By: Emily Ramirez MD    ** Please Note: Dragon 360 Dictation voice to text software may have been used in the creation of this document   **

## 2017-08-19 NOTE — PROGRESS NOTES
General Cardiology   Progress Note   Elvia Hancock 66 y o  female MRN: [id number]  Unit/Bed#: -01 Encounter: [id number]      SUBJECTIVE:   Patient has recurrent dizziness and elevated blood pressures  Patient was restarted on chlorthalidone however has significant hypokalemia   Patient was wondering about Coreg causing her dizziness and was switched to labetalol  REVIEW OF SYSTEMS:  Constitutional:  Denies fever or chills   Eyes:  Denies change in visual acuity   HENT:  Denies nasal congestion or sore throat   Respiratory:  Denies cough or shortness of breath   Cardiovascular:  Denies chest pain or edema   GI:  Denies abdominal pain, nausea, vomiting, bloody stools or diarrhea   :  Denies dysuria, frequency, difficulty in micturition and nocturia  Musculoskeletal:  Denies back pain or joint pain   Neurologic:  Dizziness  Endocrine:  Denies polyuria or polydipsia   Lymphatic:  Denies swollen glands   Psychiatric:  Denies depression or anxiety     OBJECTIVE:   Vitals:  Vitals:    08/19/17 0700   BP: 156/74   Pulse: 73   Resp: 18   Temp: 98 °F (36 7 °C)   SpO2: 97%     Body mass index is 33 76 kg/m²  Systolic (28ZKS), KKX:305 , Min:130 , FBS:692     Diastolic (11ICA), CRL:35, Min:7, Max:100      Intake/Output Summary (Last 24 hours) at 08/19/17 1031  Last data filed at 08/19/17 0533   Gross per 24 hour   Intake              660 ml   Output             2200 ml   Net            -1540 ml     Weight (last 2 days)     Date/Time   Weight    08/19/17 0531  89 2 (196 65)    08/18/17 1537  90 7 (199 96)    08/18/17 0937  90 7 (199 96)    08/18/17 0713  90 7 (200)              Telemetry Review: No significant arrhythmias seen on telemetry review  PHYSICAL EXAMS:  General:  Patient is not in acute distress, laying in the bed comfortably, awake, alert responding to commands  Head: Normocephalic, Atraumatic  HEENT:  Both pupils normal-size atraumatic, normocephalic, nonicteric  Neck:  JVP not raised   Trachea central  Respiratory:  Bronchovascular breathing all over the chest without any accompaniment  Cardiovascular:  Regular rate and rhythm no S3 no murmurs  GI:  Abdomen soft nontender  Liver and spleen normal size  Lymphatic:  No cervical or inguinal lymphadenopathy  Neurologic:  Patient is awake alert, responding to command, well-oriented to time and place and person moving     LABORATORY RESULTS:    Results from last 7 days  Lab Units 08/14/17  0515 08/14/17  0157 08/13/17  2238   TROPONIN I ng/mL <0 02 <0 02 <0 02       CBC with diff:   Results from last 7 days  Lab Units 08/14/17  0515 08/13/17  2238 08/13/17  1911   WBC Thousand/uL 4 90  --  5 05   HEMOGLOBIN g/dL 11 7  --  12 8   HEMATOCRIT % 36 7  --  39 7   MCV fL 90  --  90   PLATELETS Thousands/uL 166 174 170   MCH pg 28 6  --  29 2   MCHC g/dL 31 9  --  32 2   RDW % 14 8  --  14 9   MPV fL 12 5 12 8* 13 0*   NRBC AUTO /100 WBCs  --   --  0       CMP:  Results from last 7 days  Lab Units 08/19/17  0507 08/18/17  0728 08/15/17  0545  08/13/17  1911   SODIUM mmol/L 139 143 140  < > 141   POTASSIUM mmol/L 2 6* 3 5 3 5  < > 3 3*   CHLORIDE mmol/L 102 103 105  < > 103   CO2 mmol/L 30 30 30  < > 32   ANION GAP mmol/L 7 10 5  < > 6   BUN mg/dL 10 11 11  < > 21   CREATININE mg/dL 0 91 1 02 0 93  < > 1 41*   GLUCOSE RANDOM mg/dL 116 113 103  < > 120   CALCIUM mg/dL 9 5 9 8 9 0  < > 9 7   AST U/L  --   --   --   --  22   ALT U/L  --   --   --   --  24   ALK PHOS U/L  --   --   --   --  77   TOTAL PROTEIN g/dL  --   --   --   --  7 6   ALBUMIN g/dL  --   --   --   --  3 7   BILIRUBIN TOTAL mg/dL  --   --   --   --  0 70   EGFR ml/min/1 73sq m 72 63 71  < > 43   < > = values in this interval not displayed      BMP:  Results from last 7 days  Lab Units 08/19/17  0507 08/18/17  0728 08/15/17  0545   SODIUM mmol/L 139 143 140   POTASSIUM mmol/L 2 6* 3 5 3 5   CHLORIDE mmol/L 102 103 105   CO2 mmol/L 30 30 30   BUN mg/dL 10 11 11   CREATININE mg/dL 0 91 1 02 0 93   GLUCOSE RANDOM mg/dL 116 113 103   CALCIUM mg/dL 9 5 9 8 9 0         Results from last 7 days  Lab Units 17  2238   NT-PRO BNP pg/mL 49        Results from last 7 days  Lab Units 17  0728 17  1911   MAGNESIUM mg/dL 1 8 1 6           Results from last 7 days  Lab Units 17  0954   TSH 3RD GENERATON uIU/mL 2 309       Results from last 7 days  Lab Units 17  0515   INR  0 96       Lipid Profile:   Lab Results   Component Value Date    CHOL 187 2017     Lab Results   Component Value Date    HDL 67 (H) 2017     Lab Results   Component Value Date    LDLCALC 110 (H) 2017     Lab Results   Component Value Date    TRIG 50 2017       Cardiac testing:  Results for orders placed during the hospital encounter of 17   Echo complete with contrast if indicated    Narrative [address] [place], [place] [id number]  [phone]    Transthoracic Echocardiogram  2D, M-mode, Doppler, and Color Doppler    Study date:  03-Aug-2017    Patient: Mahnaz Godwin  MR number: [id number]  Account number: [de-identified]  : 1945  Age: 66 years  Gender: Female  Status: Outpatient  Location: Bedside  Height: 64 in  Weight: 205 lb  BP: 116/ 62 mmHg    Indications: Hypertensive heart disease, Assess left ventricular function  Diagnoses: I11 9 - Hypertensive heart disease without heart failure    Sonographer:   Elton Bryant RDCS  Interpreting Physician:  Franchesca Yusuf MD  Referring Physician:  Sylvie Mcclellan PA-C  Group:  [facility]    SUMMARY    PROCEDURE INFORMATION:  This was a technically difficult study  Echocardiographic views were limited due to poor acoustic window availability, decreased penetration, and lung interference  LEFT VENTRICLE:  Systolic function was normal  Ejection fraction was estimated to be 60 %  There were no regional wall motion abnormalities    Doppler parameters were consistent with abnormal left ventricular relaxation (grade 1 diastolic dysfunction)  RIGHT VENTRICLE:  The size was normal   Systolic function was normal     TRICUSPID VALVE:  There was mild regurgitation  Pulmonary artery systolic pressure was within the normal range  HISTORY: Dizziness  PRIOR HISTORY: Risk factors: hypertension and morbid obesity  PROCEDURE: The procedure was performed at the bedside  This was a routine study  The transthoracic approach was used  The study included complete 2D imaging, M-mode, complete spectral Doppler, and color Doppler  The heart rate was 63 bpm,  at the start of the study  Images were obtained from the parasternal, apical, and subcostal acoustic windows  Images were not obtained from the suprasternal notch acoustic windows  Echocardiographic views were limited due to poor acoustic  window availability, decreased penetration, and lung interference  This was a technically difficult study  LEFT VENTRICLE: Size was normal  Systolic function was normal  Ejection fraction was estimated to be 60 %  There were no regional wall motion abnormalities  Wall thickness was normal  No evidence of apical thrombus  DOPPLER: Doppler  parameters were consistent with abnormal left ventricular relaxation (grade 1 diastolic dysfunction)  RIGHT VENTRICLE: The size was normal  Systolic function was normal  Wall thickness was normal     LEFT ATRIUM: Size was normal     RIGHT ATRIUM: Size was normal     MITRAL VALVE: Valve structure was normal  There was normal leaflet separation  DOPPLER: The transmitral velocity was within the normal range  There was no evidence for stenosis  There was no significant regurgitation  AORTIC VALVE: The valve was trileaflet  Leaflets exhibited mildly increased thickness and normal cuspal separation  DOPPLER: Transaortic velocity was within the normal range  There was no evidence for stenosis  There was no significant  regurgitation      TRICUSPID VALVE: The valve structure was normal  There was normal leaflet separation  DOPPLER: The transtricuspid velocity was within the normal range  There was no evidence for stenosis  There was mild regurgitation  Pulmonary artery  systolic pressure was within the normal range  PULMONIC VALVE: Leaflets exhibited normal thickness, no calcification, and normal cuspal separation  DOPPLER: The transpulmonic velocity was within the normal range  There was no significant regurgitation  PERICARDIUM: There was no pericardial effusion  The pericardium was normal in appearance  AORTA: The root exhibited normal size  SYSTEMIC VEINS: IVC: The inferior vena cava was normal in size  Respirophasic changes were normal     SYSTEM MEASUREMENT TABLES    2D  %FS: 33 6 %  Ao Diam: 2 6 cm  EDV(Teich): 92 8 ml  EF(Teich): 62 5 %  ESV(Teich): 34 8 ml  IVSd: 0 9 cm  LA Area: 13 4 cm2  LA Diam: 3 1 cm  LVEDV MOD A4C: 105 3 ml  LVEF MOD A4C: 72 2 %  LVESV MOD A4C: 29 2 ml  LVIDd: 4 5 cm  LVIDs: 3 cm  LVLd A4C: 7 3 cm  LVLs A4C: 6 cm  LVPWd: 0 8 cm  RA Area: 12 6 cm2  RVIDd: 3 9 cm  SV MOD A4C: 76 ml  SV(Teich): 58 ml    CW  TR Vmax: 2 8 m/s  TR maxP 8 mmHg    MM  TAPSE: 2 4 cm    PW  E': 0 1 m/s  E/E': 13 7  MV A Darrell: 1 m/s  MV Dec Rains: 2 4 m/s2  MV DecT: 335 1 ms  MV E Darrell: 0 8 m/s  MV E/A Ratio: 0 8  MV PHT: 97 2 ms  MVA By PHT: 2 3 cm2    [employer] Accredited Echocardiography Laboratory    Prepared and electronically signed by    Drake Reynolds MD  Signed 03-Aug-2017 17:40:08       No results found for this or any previous visit  No results found for this or any previous visit  No procedure found  No results found for this or any previous visit      Meds/Allergies   all current active meds have been reviewed  Prescriptions Prior to Admission   Medication    aspirin 81 mg chewable tablet    carvedilol (COREG) 25 mg tablet    chlorthalidone 25 mg tablet    magnesium oxide (MAG-OX) 400 mg    omeprazole (PriLOSEC) 20 mg delayed release capsule    potassium chloride (K-DUR,KLOR-CON) 20 mEq tablet          ASSESSMENT & PLAN   Hypertensive episodes with periods of hypokalemia  Renal evaluation for secondary causes of hypertension    Patient has persistent hypertension and episodic dizziness  Doubt Coreg contributing  At patient's request changed to labetalol  Patient will need evaluation for possible secondary hypertension  Renal consult  Recent echocardiogram showed normal ejection fraction with no significant valvular abnormalities  Patient also had pharmacological nuclear stress test which did not show any evidence of ischemia  Importance of salt restriction reinforced with the patient  plan of care discussed at bed side  Also discussed with hospitalist service [address] [place] [address]  Chris Espinal MD  8/19/2017,10:31 AM    Portions of the record may have been created with voice recognition software   Occasional wrong word or "sound a like" substitutions may have occurred due to the inherent limitations of voice recognition software   Read the chart carefully and recognize, using context, where substitutions have occurred

## 2017-08-19 NOTE — PLAN OF CARE
DISCHARGE PLANNING - CARE MANAGEMENT     Discharge to post-acute care or home with appropriate resources Progressing        Potential for Falls     Patient will remain free of falls Progressing

## 2017-08-19 NOTE — CONSULTS
REFERRING PHYSICIAN: Rubén Patten MD     REASON FOR THE REFERRAL / CONSULTATION:  Further management of hypertension    DATE OF CONSULTATION / SERVICE:  8/19/17    ADMISSION DIAGNOSIS: Hypertensive urgency     CHIEF COMPLAINT     I have dizziness    HPI     This is a 26-year-old female was admitted initially for further management of dizziness  Patient was found to have hypertensive urgency on admission and according to the record patient was previously discharged from the hospital with a new prescription of Coreg and patient said that after initiation of Coreg see had felt more dizzy and decided to come to ER for further evaluation  During the hospital stay patient was also evaluated by Cardiology service for management of hypertension and was started on labetalol along with chlorthalidone which she has tolerated medication very well although her blood pressure has remained elevated and Nephrology were consulted for further management of hypertension  Patient said that she also have a problem with low potassium level and see was taking potassium supplement at home  Patient also a history of GERD which was well controlled with omeprazole  Patient also a history of hypo magnesemia and was using magnesium supplements at home  Patient currently denies nausea, vomiting, SOB, chest pain, abdominal pain, constipation, diarrhea or rash today      PAST MEDICAL HISTORY     Past Medical History:   Diagnosis Date    Hypertension        PAST SURGICAL HISTORY     Past Surgical History:   Procedure Laterality Date    BREAST CYST EXCISION         ALLERGIES     No Known Allergies    SOCIAL HISTORY     History   Alcohol Use    Yes     Comment: Socially     History   Drug Use No     History   Smoking Status    Never Smoker   Smokeless Tobacco    Not on file       FAMILY HISTORY     Family History   Problem Relation Age of Onset    Family history unknown: Yes       CURRENT MEDICATIONS       Current Facility-Administered Medications:     acetaminophen (TYLENOL) tablet 650 mg, 650 mg, Oral, Q6H PRN, Wilber Jackson MD    aspirin chewable tablet 81 mg, 81 mg, Oral, Daily, Wilber Jackson MD, 81 mg at 08/19/17 0910    chlorthalidone tablet 25 mg, 25 mg, Oral, Daily, Mich Champion MD, 25 mg at 08/19/17 0910    hydrALAZINE (APRESOLINE) injection 10 mg, 10 mg, Intravenous, Q4H PRN, Natalie Marie PA-C, 10 mg at 08/18/17 2342    labetalol (NORMODYNE) tablet 400 mg, 400 mg, Oral, Q12H [address], Maria E Prince MD    pantoprazole (PROTONIX) EC tablet 20 mg, 20 mg, Oral, Early Morning, Wilber Jackson MD, 20 mg at 08/19/17 9755    REVIEW OF SYSTEMS     Complete 10 point review of systems were obtained and discussed in length with the patient  Complete review of systems were negative / unremarkable except mentioned in the HPI section       LAB RESULTS          Results from last 7 days  Lab Units 08/19/17  0507 08/18/17  0728 08/15/17  0545 08/14/17  0515 08/13/17  2238 08/13/17  1911   WBC Thousand/uL  --   --   --  4 90  --  5 05   HEMOGLOBIN g/dL  --   --   --  11 7  --  12 8   HEMATOCRIT %  --   --   --  36 7  --  39 7   PLATELETS Thousands/uL  --   --   --  166 174 170   SODIUM mmol/L 139 143 140 140  --  141   POTASSIUM mmol/L 2 6* 3 5 3 5 3 1*  --  3 3*   CHLORIDE mmol/L 102 103 105 104  --  103   CO2 mmol/L 30 30 30 30  --  32   BUN mg/dL 10 11 11 15  --  21   CREATININE mg/dL 0 91 1 02 0 93 0 94  --  1 41*   EGFR ml/min/1 73sq m 72 63 71 70  --  43   CALCIUM mg/dL 9 5 9 8 9 0 8 7  --  9 7   MAGNESIUM mg/dL  --  1 8  --   --   --  1 6   ALBUMIN g/dL  --   --   --   --   --  3 7   TOTAL PROTEIN g/dL  --   --   --   --   --  7 6   GLUCOSE RANDOM mg/dL 116 113 103 106  --  120       RADIOLOGY RESULTS     Results for orders placed during the hospital encounter of 08/18/17   XR chest pa & lateral:  I have personally reviewed the images of the chest x-ray along with radiology report Narrative CHEST     INDICATION:  Hypertension/dizziness    COMPARISON:  August 13, 2017    VIEWS:  Frontal and lateral projections    IMAGES:  2    FINDINGS:         Cardiomediastinal silhouette appears unremarkable  The lungs are clear  No pneumothorax or pleural effusion  Visualized osseous structures appear within normal limits for the patient's age  Impression No acute consolidation or congestion seen  Workstation performed: [id number]       2D echocardiogram done on 8/3/17 showed EF of 60% with no regional wall motion abnormality  Grade 1 diastolic dysfunction also seen  OBJECTIVE     Current Weight: Weight - Scale: 89 2 kg (196 lb 10 4 oz)  Vitals:    08/19/17 0700   BP: 156/74   Pulse: 73   Resp: 18   Temp: 98 °F (36 7 °C)   SpO2: 97%       Intake/Output Summary (Last 24 hours) at 08/19/17 1203  Last data filed at 08/19/17 0533   Gross per 24 hour   Intake              480 ml   Output             2050 ml   Net            -1570 ml       PHYSICAL EXAMINATION     GEN:  Awake and not in acute distress  RS:  Bilateral equal air entry, no wheezing  CVS:  S1-S2 heard  Abdomen:  Soft, nontender, positive bowel sounds  Extremities:  No pedal edema, skin is warm on touch  CNS:  Awake and follows commands  HEENT:  Pink conjunctiva, no JVD, head is atraumatic  Psychiatric:  Normal mood and affect, not suicidal  Musculoskeletal:  No gross bony deformity seen  Lymph Node:  No palpable cervical lymphadenopathy    PLAN / RECOMMENDATIONS      1  Hypertension  Chronic, essential    Patient was admitted with hypertensive urgency on admission and was initially evaluated by Cardiology team   Patient fell more dizzy with the use of Coreg and was started on labetalol 200 mg PO BID which she has tolerated medication well since hospital stay  Patient is also currently on chlorthalidone 25 mg PO daily  Renal artery Doppler done on 8/18/17 showed no signs of renal artery stenosis      Patient's blood pressure has improved some in last 24 hours although remained elevated and above the goal and plan to increase labetalol to 400 mg PO BID today (hold for systolic blood pressure less than 90 and heart rate less than 50)     Plan to continue chlorthalidone 25 mg PO daily  Patient was also found to have low potassium level today and received potassium chloride 20 mEq PO daily earlier today  Plan to check BMP tomorrow morning to ensure potassium level is at goal   Patient also a history of GERD and was also taking omeprazole along with magnesium supplement and plan to check magnesium level tomorrow as PPI can also cause hypomagnesemia  Thank you for the consultation to participate in patient's care  I have personally discussed my plan with the SL[facility]  Anamaria Mar MD  Nephrology  8/19/2017        Portions of the record may have been created with voice recognition software  Occasional wrong word or "sound a like" substitutions may have occurred due to the inherent limitations of voice recognition software  Read the chart carefully and recognize, using context, where substitutions have occurred

## 2017-08-19 NOTE — PROGRESS NOTES
Progress Note - Maday Adjutant 66 y o  female MRN: [id number]    Unit/Bed#: -01 Encounter: [id number]        * Hypertensive urgency   Overview    Present on admission, currently resolved, patient intolerant to caloric, switch to labral  Management per Cardiology Nephrology  Patient still has dizziness     Dizziness   Overview    Present on admission, patient however mentions she has a history of motion sickness and prior issues with dizziness long before her blood pressures are being of control  Here likely associated with blood pressure changes, and possibly medication effect  Patient cleared by Physical therapy for discharge to home  Will add scopolamine patch     HTN (hypertension)   Overview    Uncontrolled with intolerance to medications  Per Nephrology labetalol will be increased  Carotid vascular study negative for any significant stenosis   trend daily labs     Hypokalemia   Overview    Intermittent, recent medication changes, no renal artery stenosis on ultrasound  Management per Nephrology           VTE Pharmacologic Prophylaxis:   Pharmacologic: Low risk, patient ambulatory  Mechanical: Mechanical VTE prophylaxis not in place  Patient Centered Rounds: I have performed bedside rounds with nursing staff today  Discussions with Specialists or Other Care Team Provider:  Nephrology, Cardiology  Education and Discussions with Family / Patient:  Patient  Time Spent for Care: 30 minutes  More than 50% of total time spent on counseling and coordination of care as described above      Current Length of Stay: 0 day(s)  Current Patient Status: Inpatient   Certification Statement: The patient, admitted on an observation basis, will now require > 2 midnight hospital stay due to Uncontrolled hypertension, uncontrolled dizziness, hypokalemia    Discharge Plan:  Home when medically cleared by Cardiology Nephrology  Code Status: Level 1 - Full Code    Subjective:   Overnight patient continued with dizziness, and mild right-sided headache the, she states this is not changed from her recently developed baseline  He  Blood pressure trending down  Patient denies any emesis, syncope, extremity weakness, transient numbness, speech difficulty or vision disturbances    Objective:   Vitals:   Temp (24hrs), Av 2 °F (36 8 °C), Min:97 6 °F (36 4 °C), Max:98 7 °F (37 1 °C)    HR:  [59-73] 73  Resp:  [18] 18  BP: (134-174)/(7-100) 156/74  SpO2:  [96 %-97 %] 97 %  Body mass index is 33 76 kg/m²  Input and Output Summary (last 24 hours): Intake/Output Summary (Last 24 hours) at 17 1419  Last data filed at 17 1100   Gross per 24 hour   Intake              420 ml   Output             2650 ml   Net            -2230 ml       Physical Exam:     Physical Exam   Constitutional: She is oriented to person, place, and time  She appears well-developed and well-nourished  She appears distressed  From feeling dizzy sitting at the edge of bed   HENT:   Head: Normocephalic and atraumatic  Mouth/Throat: Oropharynx is clear and moist    Eyes: Conjunctivae and EOM are normal  Pupils are equal, round, and reactive to light  Neck: Normal range of motion  Neck supple  No JVD present  No thyromegaly present  Cardiovascular: Normal rate, regular rhythm, normal heart sounds and intact distal pulses  No murmur heard  Pulmonary/Chest: Effort normal and breath sounds normal    Musculoskeletal: Normal range of motion  She exhibits no edema  Neurological: She is alert and oriented to person, place, and time  She is not disoriented  She displays no tremor  No cranial nerve deficit or sensory deficit  She exhibits normal muscle tone  Skin: Skin is warm and dry  No rash noted  She is not diaphoretic  No pallor  Psychiatric: Her behavior is normal  Judgment and thought content normal  She exhibits a depressed mood  Nursing note and vitals reviewed        Additional Data:   Labs:    Results from last 7 days  Lab Units 08/14/17  0515  08/13/17  1911   WBC Thousand/uL 4 90  --  5 05   HEMOGLOBIN g/dL 11 7  --  12 8   HEMATOCRIT % 36 7  --  39 7   PLATELETS Thousands/uL 166  < > 170   NEUTROS PCT %  --   --  51   LYMPHS PCT %  --   --  35   MONOS PCT %  --   --  11   EOS PCT %  --   --  3   < > = values in this interval not displayed  Results from last 7 days  Lab Units 08/19/17  0507  08/13/17  1911   SODIUM mmol/L 139  < > 141   POTASSIUM mmol/L 2 6*  < > 3 3*   CHLORIDE mmol/L 102  < > 103   CO2 mmol/L 30  < > 32   BUN mg/dL 10  < > 21   CREATININE mg/dL 0 91  < > 1 41*   CALCIUM mg/dL 9 5  < > 9 7   TOTAL PROTEIN g/dL  --   --  7 6   BILIRUBIN TOTAL mg/dL  --   --  0 70   ALK PHOS U/L  --   --  77   ALT U/L  --   --  24   AST U/L  --   --  22   GLUCOSE RANDOM mg/dL 116  < > 120   < > = values in this interval not displayed  Results from last 7 days  Lab Units 08/14/17  0515   INR  0 96       * I Have Reviewed All Lab Data Listed Above  * Additional Pertinent Lab Tests Reviewed: All Labs Within Last 24 Hours Reviewed    Imaging:    Imaging Reports Reviewed Today Include:  CT head, renal vascular ultrasound, carotid ultrasound    Cultures:   Blood Culture: No results found for: BLOODCX  Urine Culture: No results found for: URINECX  Sputum Culture: No components found for: SPUTUMCX  Wound Culture: No results found for: WOUNDCULT    Last 24 Hours Medication List:     aspirin 81 mg Oral Daily   chlorthalidone 25 mg Oral Daily   labetalol 400 mg Oral Q12H [facility]   pantoprazole 20 mg Oral Early Morning   scopolamine 1 patch Transdermal Q72H        Today, Patient Was Seen By: Carmen Ruffin MD    ** Please Note: Dragon 360 Dictation voice to text software may have been used in the creation of this document   **

## 2017-08-19 NOTE — CASE MANAGEMENT
Initial Clinical Review    Admission: Date/Time/Statement:   8/19/17 AT 1057 ADMIT INPATIENT  (8/18/17 AT 0929 OBSERVATION)    08/19/17 1057  Inpatient Admission Once     Transfer Service: General Medicine       Question Answer Comment   Admitting Physician Rolan Mohan    Level of Care Med Surg    Estimated length of stay More than 2 Midnights    Certification I certify that inpatient services are medically necessary for this patient for a duration of greater than two midnights  See H&P and MD Progress Notes for additional information about the patient's course of treatment  ED: Date/Time/Mode of Arrival:   ED Arrival Information     Expected Arrival Acuity Means of Arrival Escorted By Service Admission Type    - 8/18/2017 07:03 Urgent Walk-In Self General Medicine Urgent    Arrival Complaint    High BP        Chief Complaint:   Chief Complaint   Patient presents with    Hypertension     Pt c/o high blood pressure this am with dizziness  Pt states she was recently seen in the ED for the same  Chief Complaint:   Dizziness     History of Present Illness:     Giuseppe Contreras is a 66 y o  female with past medical history pertinent for hypertension, recently not controlled, discharged from our service earlier this week who presents with significant dizziness, progressively worse over night and this morning  She has been struggling with dizziness since she started Coreg  She has had problems with hypertension since she was 42years old  She admits that she has been inconsistent with her potassium supplement at home  She denies any recent fall, head injury, focal neurological deficits like arm or leg weakness, visual disturbance, speech difficulty, isolated numbness  All reviewed prior admission and discharge  Patient was evaluated in the ED, she was given IV labetalol, her blood pressure has not trended down  Cardiology was called for evaluation, advised admission for observation  Coreg was stopped, patient started on oral labetalol  Currently she is complaining of feeling fatigued and in patient with the medication adjustment process     Review of Systems:   Constitutional: Positive for activity change and fatigue  Respiratory: Negative  Cardiovascular: Negative  Gastrointestinal: Negative for abdominal pain  Neurological: Positive for dizziness, weakness and light-headedness  Negative for tremors, syncope, facial asymmetry, speech difficulty, numbness and headaches  Psychiatric/Behavioral: Positive for sleep disturbance         ED Vital Signs:   ED Triage Vitals   Temperature Pulse Respirations Blood Pressure SpO2   08/18/17 0713 08/18/17 0709 08/18/17 0709 08/18/17 0709 08/18/17 0709   97 7 °F (36 5 °C) 80 22 (!) 211/100 97 %      Temp Source Heart Rate Source Patient Position BP Location FiO2 (%)   08/18/17 0713 08/18/17 0709 08/18/17 0709 08/18/17 0709 --   Oral Monitor Sitting Right arm       Pain Score       08/18/17 0713       No Pain        Wt Readings from Last 1 Encounters:   08/19/17 89 2 kg (196 lb 10 4 oz)      08/18 0701  08/19 0700 08/19 0701  08/19 1334  Most Recent    Temperature (°F) 97 6-98 7    98 (36 7)    Pulse 56-82    73    Respirations 18-22    18    Blood Pressure 130//100    156/74    SpO2 (%) 96-98    97        LABS/Diagnostic Test Results:   CBC Results from last 7 days  Lab Units 08/14/17  0515   08/13/17 1911   WBC Thousand/uL 4 90  --  5 05   HEMOGLOBIN g/dL 11 7  --  12 8   HEMATOCRIT % 36 7  --  39 7   PLATELETS Thousands/uL 166  < > 170   NEUTROS PCT %  --   --  51   LYMPHS PCT %  --   --  35   MONOS PCT %  --   --  11   EOS PCT %  --   --  3   < > = values in this interval not displayed      CMP  Results from last 7 days  Lab Units 08/18/17  0728   08/13/17 1911   SODIUM mmol/L 143  < > 141   POTASSIUM mmol/L 3 5  < > 3 3*   CHLORIDE mmol/L 103  < > 103   CO2 mmol/L 30  < > 32   BUN mg/dL 11  < > 21   CREATININE mg/dL 1 02  < > 1 41* CALCIUM mg/dL 9 8  < > 9 7   TOTAL PROTEIN g/dL  --   --  7 6   BILIRUBIN TOTAL mg/dL  --   --  0 70   ALK PHOS U/L  --   --  77   ALT U/L  --   --  24   AST U/L  --   --  22   GLUCOSE RANDOM mg/dL 113  < > 120   < > = values in this interval not displayed      Results from last 7 days  Lab Units 08/14/17  0515   INR   0 96       CT CHEST 8/14/17 -  No pulmonary arterial embolism or acute pulmonary findings  Dominant 10 mm left lower lobe nodule  EKG:  · Normal sinus rhythm, no acute ST changes      ED Treatment:   Medication Administration from 08/18/2017 0703 to 08/18/2017 1039       Date/Time Order Dose Route Action Action by Comments     08/18/2017 0735 labetalol (NORMODYNE) injection 10 mg 10 mg Intravenous Given Karen Fam RN /78; HR 72     08/18/2017 0928 labetalol (NORMODYNE) tablet 200 mg 200 mg Oral Given Eboni Gale RN      08/18/2017 0949 chlorthalidone tablet 25 mg 25 mg Oral Given Eboni Gale RN      08/18/2017 8245 aspirin chewable tablet 81 mg 81 mg Oral Given Eboni Gale RN      08/18/2017 0949 pantoprazole (PROTONIX) EC tablet 20 mg 20 mg Oral Given Eboni Gale RN           Past Medical/Surgical History: Active Ambulatory Problems     Diagnosis Date Noted    Dizziness 08/02/2017     Resolved Ambulatory Problems     Diagnosis Date Noted    Hypertensive crisis 08/02/2017    Hypokalemia 08/02/2017    Thrombocytopenia 08/02/2017    Hypertension 08/05/2017    Chest pain 08/13/2017    Nodule of left lung 08/13/2017    NADEEN (acute kidney injury) 08/13/2017    Positive D dimer 08/13/2017     Past Medical History:   Diagnosis Date    Hypertension      Admitting Diagnosis: Dizziness [R42]  Hypertension [I10]    Age/Sex: 66 y o  female    Assessment/Plan:   Assessment/Plan:     Hospital Problem List:      Principal Problem:    Hypertensive urgency  Active Problems:    Dizziness      Plan for the Primary Problem(s):  · Hypertensive urgency  ?  Present on admission, Cardiology saw patient in ED  ? Patient intolerant of Coreg was switched to labetalol  ? Continue chlorthalidone and potassium supplement  ? Check ultrasound tto rule out renal artery stenosis  ? Thyroid function normal     Plan for Additional Problems:   · Dizziness  Most likely secondary to beta-blocker, patient switched to a different medication from same class  Monitor for response in the possible side effects  Check morning lab work, potassium is normal  Check carotid ultrasound     VTE Prophylaxis: Patient admitted for observation anticipated length of stay less than  / sequential compression device and reason for no mechanical VTE prophylaxis Patient ambulatory   Code Status: full  POLST: There is no POLST form on file for this patient (pre-hospital)     Anticipated Length of Stay:  Patient will be admitted on an Observation basis with an anticipated length of stay of  Less than 2 midnights  Justification for Hospital Stay:  Observation for blood pressure control and medication adjustment      Admission Orders:   8/19/17 AT 1057 ADMIT INPATIENT  (8/18/17 AT 0929 OBSERVATION)  TELEMETRY  VS Q4HRS    Up as Tolerated  SCD    CARDIAC STEP 1 DIET    IV Access    Scheduled Meds:   aspirin 81 mg Oral Daily   chlorthalidone 25 mg Oral Daily   labetalol 400 mg Oral Q12H [facility]   pantoprazole 20 mg Oral Early Morning   scopolamine 1 patch Transdermal Q72H       PRN Meds:     acetaminophen    IV hydrALAZINE 10 mg q4hrs prn SBP > 160 given x 1    Consult Renal    RENAL ARTERY DOPPLER    CAROTID DOPPLER    PT Eval       Cardiology  Progress Notes Date of Service: 8/19/2017 10:30 AM     ASSESSMENT & PLAN     Hypertensive episodes with periods of hypokalemia  Renal evaluation for secondary causes of hypertension        Nephrology  Consults Date of Service: 8/19/2017 12:03 PM     PLAN / RECOMMENDATIONS       1  Hypertension    Chronic, essential     Patient was admitted with hypertensive urgency on admission and was initially evaluated by Cardiology team   Patient fell more dizzy with the use of Coreg and was started on labetalol 200 mg PO BID which she has tolerated medication well since hospital stay  Patient is also currently on chlorthalidone 25 mg PO daily  Renal artery Doppler done on 8/18/17 showed no signs of renal artery stenosis      Patient's blood pressure has improved some in last 24 hours although remained elevated and above the goal and plan to increase labetalol to 400 mg PO BID today (hold for systolic blood pressure less than 90 and heart rate less than 50)     Plan to continue chlorthalidone 25 mg PO daily  Patient was also found to have low potassium level today and received potassium chloride 20 mEq PO daily earlier today  Plan to check BMP tomorrow morning to ensure potassium level is at goal   Patient also a history of GERD and was also taking omeprazole along with magnesium supplement and plan to check magnesium level tomorrow as PPI can also cause hypomagnesemia

## 2017-08-20 PROBLEM — E83.42 HYPOMAGNESEMIA: Status: ACTIVE | Noted: 2017-08-20

## 2017-08-20 PROBLEM — I16.0 HYPERTENSIVE URGENCY: Status: RESOLVED | Noted: 2017-08-18 | Resolved: 2017-08-20

## 2017-08-20 LAB
ANION GAP SERPL CALCULATED.3IONS-SCNC: 9 MMOL/L (ref 4–13)
BUN SERPL-MCNC: 11 MG/DL (ref 5–25)
CALCIUM SERPL-MCNC: 9.5 MG/DL (ref 8.3–10.1)
CHLORIDE SERPL-SCNC: 103 MMOL/L (ref 100–108)
CO2 SERPL-SCNC: 30 MMOL/L (ref 21–32)
CREAT SERPL-MCNC: 1.09 MG/DL (ref 0.6–1.3)
GFR SERPL CREATININE-BSD FRML MDRD: 58 ML/MIN/1.73SQ M
GLUCOSE SERPL-MCNC: 96 MG/DL (ref 65–140)
MAGNESIUM SERPL-MCNC: 1.7 MG/DL (ref 1.6–2.6)
POTASSIUM SERPL-SCNC: 3.1 MMOL/L (ref 3.5–5.3)
SODIUM SERPL-SCNC: 142 MMOL/L (ref 136–145)

## 2017-08-20 PROCEDURE — 80048 BASIC METABOLIC PNL TOTAL CA: CPT | Performed by: INTERNAL MEDICINE

## 2017-08-20 PROCEDURE — 83735 ASSAY OF MAGNESIUM: CPT | Performed by: INTERNAL MEDICINE

## 2017-08-20 RX ORDER — POTASSIUM CHLORIDE 20 MEQ/1
40 TABLET, EXTENDED RELEASE ORAL DAILY
Status: DISCONTINUED | OUTPATIENT
Start: 2017-08-20 | End: 2017-08-22

## 2017-08-20 RX ADMIN — PANTOPRAZOLE SODIUM 20 MG: 20 TABLET, DELAYED RELEASE ORAL at 05:03

## 2017-08-20 RX ADMIN — LABETALOL HYDROCHLORIDE 400 MG: 100 TABLET, FILM COATED ORAL at 20:38

## 2017-08-20 RX ADMIN — CHLORTHALIDONE 25 MG: 25 TABLET ORAL at 09:01

## 2017-08-20 RX ADMIN — ASPIRIN 81 MG 81 MG: 81 TABLET ORAL at 09:00

## 2017-08-20 RX ADMIN — LABETALOL HYDROCHLORIDE 400 MG: 100 TABLET, FILM COATED ORAL at 09:01

## 2017-08-20 RX ADMIN — POTASSIUM CHLORIDE 40 MEQ: 1500 TABLET, EXTENDED RELEASE ORAL at 12:17

## 2017-08-20 NOTE — PROGRESS NOTES
Progress Note - Dennise Ramirez 66 y o  female MRN: [id number]    Unit/Bed#: -01 Encounter: [id number]        Dizziness   Overview    Present prior to admission, suspect a combination of side effect of Coreg and patient has history of positional dizziness and motion sickness  Today's improved since labetalol has been increased and patient has been on scopolamine patch  Monitor overnight, possibly discharge tomorrow if improved       Hypomagnesemia   Overview    Along with hypokalemia, currently resolved     Hypokalemia   Overview    Patient is on chlorthalidone, no renal artery stenosis, per Nephrology daily supplement     * HTN (hypertension)   Overview    Presented with urgency, which has resolved, patient changed from Coreg to labetalol with increased dose, tolerating well, dizziness is improving  No evidence of carotid or renal artery stenosis            VTE Pharmacologic Prophylaxis:   Pharmacologic: None patient is low risk, ambulatory  Mechanical: Mechanical VTE prophylaxis not in place  Patient Centered Rounds: I have performed bedside rounds with nursing staff today  Discussions with Specialists or Other Care Team Provider:  Nephrology, Cardiology  Education and Discussions with Family / Patient:  Patient and daughter  Time Spent for Care: 20 minutes  More than 50% of total time spent on counseling and coordination of care as described above  Current Length of Stay: 1 day(s)  Current Patient Status: Inpatient   Certification Statement: The patient will continue to require additional inpatient hospital stay due to Management of hypertension, dizziness    Discharge Plan:  Home tomorrow if dizziness improve  Code Status: Level 1 - Full Code    Subjective:   Patient reports improved dizziness, she is able to walk without feeling wobbly and her nausea has resolved  She is sitting in chair, her daughter is visiting    Patient reports good appetite    Objective:   Vitals:   Temp (24hrs), Av 4 °F (36 9 °C), Min:98 2 °F (36 8 °C), Max:98 8 °F (37 1 °C)    HR:  [71-84] 71  Resp:  [18] 18  BP: (120-133)/(61-72) 120/61  SpO2:  [94 %-96 %] 94 %  Body mass index is 33 68 kg/m²  Input and Output Summary (last 24 hours): Intake/Output Summary (Last 24 hours) at 17 1705  Last data filed at 17 1300   Gross per 24 hour   Intake              660 ml   Output             1550 ml   Net             -890 ml       Physical Exam:     Physical Exam   Constitutional: She is oriented to person, place, and time  She appears well-developed and well-nourished  No distress  HENT:   Head: Normocephalic and atraumatic  Eyes: Conjunctivae and EOM are normal  Pupils are equal, round, and reactive to light  Neck: Normal range of motion  Neck supple  Cardiovascular: Normal rate and regular rhythm  Pulmonary/Chest: Effort normal and breath sounds normal    Abdominal: Soft  Bowel sounds are normal    Musculoskeletal: Normal range of motion  Neurological: She is alert and oriented to person, place, and time  No cranial nerve deficit  She exhibits normal muscle tone  Coordination normal    Skin: Skin is warm and dry  She is not diaphoretic  Psychiatric: She has a normal mood and affect  Her behavior is normal  Judgment and thought content normal    Nursing note and vitals reviewed  Additional Data:   Labs:    Results from last 7 days  Lab Units 17  0515  17  1911   WBC Thousand/uL 4 90  --  5 05   HEMOGLOBIN g/dL 11 7  --  12 8   HEMATOCRIT % 36 7  --  39 7   PLATELETS Thousands/uL 166  < > 170   NEUTROS PCT %  --   --  51   LYMPHS PCT %  --   --  35   MONOS PCT %  --   --  11   EOS PCT %  --   --  3   < > = values in this interval not displayed      Results from last 7 days  Lab Units 17  0448  17  1911   SODIUM mmol/L 142  < > 141   POTASSIUM mmol/L 3 1*  < > 3 3*   CHLORIDE mmol/L 103  < > 103   CO2 mmol/L 30  < > 32   BUN mg/dL 11  < > 21   CREATININE mg/dL 1 09 < > 1 41*   CALCIUM mg/dL 9 5  < > 9 7   TOTAL PROTEIN g/dL  --   --  7 6   BILIRUBIN TOTAL mg/dL  --   --  0 70   ALK PHOS U/L  --   --  77   ALT U/L  --   --  24   AST U/L  --   --  22   GLUCOSE RANDOM mg/dL 96  < > 120   < > = values in this interval not displayed  Results from last 7 days  Lab Units 08/14/17  0515   INR  0 96       * I Have Reviewed All Lab Data Listed Above  * Additional Pertinent Lab Tests Reviewed: All Labs Within Last 24 Hours Reviewed    Imaging:    Imaging Reports Reviewed Today Include:  None new    Cultures:   Blood Culture: No results found for: BLOODCX  Urine Culture: No results found for: URINECX  Sputum Culture: No components found for: SPUTUMCX  Wound Culture: No results found for: WOUNDCULT    Last 24 Hours Medication List:     aspirin 81 mg Oral Daily   chlorthalidone 25 mg Oral Daily   labetalol 400 mg Oral Q12H [facility]term   potassium chloride 40 mEq Oral Daily   scopolamine 1 patch Transdermal Q72H        Today, Patient Was Seen By: Debbie Mott MD    ** Please Note: Dragon 360 Dictation voice to text software may have been used in the creation of this document   **

## 2017-08-20 NOTE — PROGRESS NOTES
NEPHROLOGY PROGRESS NOTE    Patient: Alberta Joel               Sex: female          DOA: 8/18/2017  7:06 AM   YOB: 1945        Age:  66 y o         LOS:  LOS: 1 day     REASON FOR THE CONSULTATION:  Further management of hypertension    HPI     This is a 70-year-old female admitted for further management of dizziness and hypertensive urgency and Nephrology were consulted for blood pressure management earlier  SUBJECTIVE     - dizziness has improved some but patient continue complains of dizziness today  Found to be afebrile and nonoliguric overnight  Patient denies nausea / vomiting / headache / SOB / chest pain today    - Reviewed last 24 hrs events     CURRENT MEDICATIONS       Current Facility-Administered Medications:     acetaminophen (TYLENOL) tablet 650 mg, 650 mg, Oral, Q6H PRN, Anibal Bryant MD    aspirin chewable tablet 81 mg, 81 mg, Oral, Daily, Anibal Bryant MD, 81 mg at 08/20/17 0900    chlorthalidone tablet 25 mg, 25 mg, Oral, Daily, Sara Dubin, MD, 25 mg at 08/20/17 0901    hydrALAZINE (APRESOLINE) injection 10 mg, 10 mg, Intravenous, Q4H PRN, Ramonita Kidd PA-C, 10 mg at 08/18/17 2342    labetalol (NORMODYNE) tablet 400 mg, 400 mg, Oral, Q12H [address], Andres Yanez MD, 400 mg at 08/20/17 0901    ondansetron [facility]) injection 4 mg, 4 mg, Intravenous, Q4H PRN, PATRICK Ferro, 4 mg at 08/19/17 2024    potassium chloride (K-DUR,KLOR-CON) CR tablet 40 mEq, 40 mEq, Oral, Daily, Andres Yanez MD, 40 mEq at 08/20/17 1217    scopolamine (TRANSDERM-SCOP) 1 5 mg/3 days TD 72 hr patch 1 patch, 1 patch, Transdermal, Q72H, Anibal Bryant MD, 1 patch at 08/19/17 1527    OBJECTIVE     Current Weight: Weight - Scale: 89 kg (196 lb 3 4 oz)  Vitals:    08/20/17 0700   BP: 133/67   Pulse: 84   Resp: 18   Temp: 98 8 °F (37 1 °C)   SpO2: 96%       Intake/Output Summary (Last 24 hours) at 08/20/17 1234  Last data filed at 08/20/17 1100   Gross per 24 hour   Intake              600 ml   Output             1850 ml   Net            -1250 ml       PHYSICAL EXAMINATION     GEN:  Awake and not in acute distress  RS:  Bilateral equal air entry, no wheezing  CVS:  S1-S2 heard  Abdomen:  Soft, nontender, nondistended, positive bowel sounds  Extremities:  No pedal edema, skin is warm on touch  CNS:  Awake and follows commands  HEENT:  No JVD, head is atraumatic  Psychiatric:  Normal mood and affect, not suicidal  Musculoskeletal:  No gross bony deformity seen  Lymph Node:  No palpable cervical lymphadenopathy    LAB RESULTS       Results from last 7 days  Lab Units 08/20/17  0448 08/19/17 2007 08/19/17  0507 08/18/17  0728 08/15/17  0545 08/14/17  0515 08/13/17  2238 08/13/17  1911   WBC Thousand/uL  --   --   --   --   --  4 90  --  5 05   HEMOGLOBIN g/dL  --   --   --   --   --  11 7  --  12 8   HEMATOCRIT %  --   --   --   --   --  36 7  --  39 7   PLATELETS Thousands/uL  --   --   --   --   --  166 174 170   SODIUM mmol/L 142  --  139 143 140 140  --  141   POTASSIUM mmol/L 3 1* 3 0* 2 6* 3 5 3 5 3 1*  --  3 3*   CHLORIDE mmol/L 103  --  102 103 105 104  --  103   CO2 mmol/L 30  --  30 30 30 30  --  32   BUN mg/dL 11  --  10 11 11 15  --  21   CREATININE mg/dL 1 09  --  0 91 1 02 0 93 0 94  --  1 41*   EGFR ml/min/1 73sq m 58  --  72 63 71 70  --  43   CALCIUM mg/dL 9 5  --  9 5 9 8 9 0 8 7  --  9 7   MAGNESIUM mg/dL 1 7  --   --  1 8  --   --   --  1 6   ALBUMIN g/dL  --   --   --   --   --   --   --  3 7   TOTAL PROTEIN g/dL  --   --   --   --   --   --   --  7 6   GLUCOSE RANDOM mg/dL 96  --  116 113 103 106  --  120       RADIOLOGY RESULTS      No results found for this or any previous visit    Results for orders placed during the hospital encounter of 08/18/17   XR chest pa & lateral    Narrative CHEST     INDICATION:  Hypertension/dizziness    COMPARISON:  August 13, 2017    VIEWS:  Frontal and lateral projections    IMAGES:  2    FINDINGS: Cardiomediastinal silhouette appears unremarkable  The lungs are clear  No pneumothorax or pleural effusion  Visualized osseous structures appear within normal limits for the patient's age  Impression No acute consolidation or congestion seen  Workstation performed: [id number]         PLAN / RECOMMENDATIONS      1  Hypertension  Essential and chronic    Renal artery Doppler did not show any signs of renal artery stenosis  Currently patient is on labetalol 400 mg PO BID along with chlorthalidone 25 mg PO daily and overnight patient's blood pressure has improved significantly and now at goal   Plan to monitor hypertension on current regimen  2   Hypokalemia  Multifactorial and suspect secondary to use of chlorthalidone  Today's potassium level was 3 1 which is below the goal and plan to start patient on potassium chloride 40 mg PO daily  3   Hypomagnesemia  Multifactorial and suspect secondary to use of EPI/Omeprazole  Patient has been taking omeprazole for lung free of time and said that she only have GERD symptoms occasionally  Plan to stop PPIs today and advised patient to take omeprazole when necessary  Current magnesium level is 1 7 and plan to monitor magnesium level while in the hospital     Disposition:  Stable from renal standpoint for discharge    Plan was also d/w Ronny Daniels MD  Nephrology  8/20/2017        Portions of the record may have been created with voice recognition software  Occasional wrong word or "sound a like" substitutions may have occurred due to the inherent limitations of voice recognition software  Read the chart carefully and recognize, using context, where substitutions have occurred

## 2017-08-20 NOTE — PROGRESS NOTES
General Cardiology   Progress Note   Dany Noonan 66 y o  female MRN: [id number]  Unit/Bed#: -01 Encounter: [id number]        Subjective:    No significant events since the last encounter  Blood pressures improved significantly  Patient says the dizziness is improved but she still continues to have dizziness which she describes as lack of balance  No chest pain or shortness of breath    Objective:   Vitals:  Vitals:    08/20/17 0700   BP: 133/67   Pulse: 84   Resp: 18   Temp: 98 8 °F (37 1 °C)   SpO2: 96%       Body mass index is 33 68 kg/m²  Systolic (42BDU), SZN:589 , Min:129 , CKD:652     Diastolic (61QOY), KVP:48, Min:67, Max:72      Intake/Output Summary (Last 24 hours) at 08/20/17 1108  Last data filed at 08/20/17 0901   Gross per 24 hour   Intake              360 ml   Output             1400 ml   Net            -1040 ml     Weight (last 2 days)     Date/Time   Weight    08/20/17 0600  89 (196 21)    08/19/17 0531  89 2 (196 65)    08/18/17 1537  90 7 (199 96)    08/18/17 0937  90 7 (199 96)    08/18/17 0713  90 7 (200)              Telemetry Review: No significant arrhythmias seen on telemetry review  PHYSICAL EXAMS:  General:  Patient is not in acute distress, laying in the bed comfortably, awake, alert responding to commands  Head: Normocephalic, Atraumatic  HEENT: White sclera, pink conjunctiva,  PERRLA,pharynx benign  Neck:  Supple, no neck vein distention, carotids+2/+2 no bruits, thyromegaly, adenopathy  Respiratory: clear to P/A  Cardiovascular:  PMI normal, S1-S2 normal, No  Murmurs, thrills, gallops, rubs   Regular rhythm  GI:  Abdomen soft nontender   No hepatosplenomegaly, adenopathy, ascites,or rebound tenderness  Extremities: No edema, normal pulses, no calf tenderness, no joint deformities, no venous disease   Integument:  No skin rashes or ulceration  Lymphatic:  No cervical or inguinal lymphadenopathy  Neurologic:  Patient is awake alert, responding to command, well-oriented to time and place and person moving all extremities      LABORATORY RESULTS:    Results from last 7 days  Lab Units 08/14/17 0515 08/14/17 0157 08/13/17 2238   TROPONIN I ng/mL <0 02 <0 02 <0 02     CBC with diff:   Results from last 7 days  Lab Units 08/14/17 0515 08/13/17 2238 08/13/17  1911   WBC Thousand/uL 4 90  --  5 05   HEMOGLOBIN g/dL 11 7  --  12 8   HEMATOCRIT % 36 7  --  39 7   MCV fL 90  --  90   PLATELETS Thousands/uL 166 174 170   MCH pg 28 6  --  29 2   MCHC g/dL 31 9  --  32 2   RDW % 14 8  --  14 9   MPV fL 12 5 12 8* 13 0*   NRBC AUTO /100 WBCs  --   --  0       CMP:  Results from last 7 days  Lab Units 08/20/17 0448 08/19/17 2007 08/19/17 0507 08/18/17 0728 08/13/17  1911   SODIUM mmol/L 142  --  139 143  < > 141   POTASSIUM mmol/L 3 1* 3 0* 2 6* 3 5  < > 3 3*   CHLORIDE mmol/L 103  --  102 103  < > 103   CO2 mmol/L 30  --  30 30  < > 32   ANION GAP mmol/L 9  --  7 10  < > 6   BUN mg/dL 11  --  10 11  < > 21   CREATININE mg/dL 1 09  --  0 91 1 02  < > 1 41*   GLUCOSE RANDOM mg/dL 96  --  116 113  < > 120   CALCIUM mg/dL 9 5  --  9 5 9 8  < > 9 7   AST U/L  --   --   --   --   --  22   ALT U/L  --   --   --   --   --  24   ALK PHOS U/L  --   --   --   --   --  77   TOTAL PROTEIN g/dL  --   --   --   --   --  7 6   ALBUMIN g/dL  --   --   --   --   --  3 7   BILIRUBIN TOTAL mg/dL  --   --   --   --   --  0 70   EGFR ml/min/1 73sq m 58  --  72 63  < > 43   < > = values in this interval not displayed      BMP:  Results from last 7 days  Lab Units 08/20/17  0448 08/19/17  2007 08/19/17  0507 08/18/17  0728   SODIUM mmol/L 142  --  139 143   POTASSIUM mmol/L 3 1* 3 0* 2 6* 3 5   CHLORIDE mmol/L 103  --  102 103   CO2 mmol/L 30  --  30 30   BUN mg/dL 11  --  10 11   CREATININE mg/dL 1 09  --  0 91 1 02   GLUCOSE RANDOM mg/dL 96  --  116 113   CALCIUM mg/dL 9 5  --  9 5 9 8         Results from last 7 days  Lab Units 08/13/17  2238   NT-PRO BNP pg/mL 49        Results from last 7 days  Lab Units 17  0448 17  0728 17  1911   MAGNESIUM mg/dL 1 7 1 8 1 6           Results from last 7 days  Lab Units 17  0954   TSH 3RD GENERATON uIU/mL 2 309       Results from last 7 days  Lab Units 17  0515   INR  0 96       Lipid Profile:   Lab Results   Component Value Date    CHOL 187 2017     Lab Results   Component Value Date    HDL 67 (H) 2017     Lab Results   Component Value Date    LDLCALC 110 (H) 2017     Lab Results   Component Value Date    TRIG 50 2017       Cardiac testing:   Results for orders placed during the hospital encounter of 17   Echo complete with contrast if indicated    Narrative [address] [place] [address]  [phone]    Transthoracic Echocardiogram  2D, M-mode, Doppler, and Color Doppler    Study date:  03-Aug-2017    Patient: Rachel Villeda  MR number: [id number]  Account number: [de-identified]  : 1945  Age: 66 years  Gender: Female  Status: Outpatient  Location: Bedside  Height: 64 in  Weight: 205 lb  BP: 116/ 62 mmHg    Indications: Hypertensive heart disease, Assess left ventricular function  Diagnoses: I11 9 - Hypertensive heart disease without heart failure    Sonographer:   Elton Bryant RDCS  Interpreting Physician:  Smith Chavez MD  Referring Physician:  Lety Wells PA-C  Group:  [facility]    SUMMARY    PROCEDURE INFORMATION:  This was a technically difficult study  Echocardiographic views were limited due to poor acoustic window availability, decreased penetration, and lung interference  LEFT VENTRICLE:  Systolic function was normal  Ejection fraction was estimated to be 60 %  There were no regional wall motion abnormalities  Doppler parameters were consistent with abnormal left ventricular relaxation (grade 1 diastolic dysfunction)      RIGHT VENTRICLE:  The size was normal   Systolic function was normal     TRICUSPID VALVE:  There was mild regurgitation  Pulmonary artery systolic pressure was within the normal range  HISTORY: Dizziness  PRIOR HISTORY: Risk factors: hypertension and morbid obesity  PROCEDURE: The procedure was performed at the bedside  This was a routine study  The transthoracic approach was used  The study included complete 2D imaging, M-mode, complete spectral Doppler, and color Doppler  The heart rate was 63 bpm,  at the start of the study  Images were obtained from the parasternal, apical, and subcostal acoustic windows  Images were not obtained from the suprasternal notch acoustic windows  Echocardiographic views were limited due to poor acoustic  window availability, decreased penetration, and lung interference  This was a technically difficult study  LEFT VENTRICLE: Size was normal  Systolic function was normal  Ejection fraction was estimated to be 60 %  There were no regional wall motion abnormalities  Wall thickness was normal  No evidence of apical thrombus  DOPPLER: Doppler  parameters were consistent with abnormal left ventricular relaxation (grade 1 diastolic dysfunction)  RIGHT VENTRICLE: The size was normal  Systolic function was normal  Wall thickness was normal     LEFT ATRIUM: Size was normal     RIGHT ATRIUM: Size was normal     MITRAL VALVE: Valve structure was normal  There was normal leaflet separation  DOPPLER: The transmitral velocity was within the normal range  There was no evidence for stenosis  There was no significant regurgitation  AORTIC VALVE: The valve was trileaflet  Leaflets exhibited mildly increased thickness and normal cuspal separation  DOPPLER: Transaortic velocity was within the normal range  There was no evidence for stenosis  There was no significant  regurgitation  TRICUSPID VALVE: The valve structure was normal  There was normal leaflet separation  DOPPLER: The transtricuspid velocity was within the normal range  There was no evidence for stenosis  There was mild regurgitation  Pulmonary artery  systolic pressure was within the normal range  PULMONIC VALVE: Leaflets exhibited normal thickness, no calcification, and normal cuspal separation  DOPPLER: The transpulmonic velocity was within the normal range  There was no significant regurgitation  PERICARDIUM: There was no pericardial effusion  The pericardium was normal in appearance  AORTA: The root exhibited normal size  SYSTEMIC VEINS: IVC: The inferior vena cava was normal in size  Respirophasic changes were normal     SYSTEM MEASUREMENT TABLES    2D  %FS: 33 6 %  Ao Diam: 2 6 cm  EDV(Teich): 92 8 ml  EF(Teich): 62 5 %  ESV(Teich): 34 8 ml  IVSd: 0 9 cm  LA Area: 13 4 cm2  LA Diam: 3 1 cm  LVEDV MOD A4C: 105 3 ml  LVEF MOD A4C: 72 2 %  LVESV MOD A4C: 29 2 ml  LVIDd: 4 5 cm  LVIDs: 3 cm  LVLd A4C: 7 3 cm  LVLs A4C: 6 cm  LVPWd: 0 8 cm  RA Area: 12 6 cm2  RVIDd: 3 9 cm  SV MOD A4C: 76 ml  SV(Teich): 58 ml    CW  TR Vmax: 2 8 m/s  TR maxP 8 mmHg    MM  TAPSE: 2 4 cm    PW  E': 0 1 m/s  E/E': 13 7  MV A Darrell: 1 m/s  MV Dec Sibley: 2 4 m/s2  MV DecT: 335 1 ms  MV E Darrell: 0 8 m/s  MV E/A Ratio: 0 8  MV PHT: 97 2 ms  MVA By PHT: 2 3 cm2    [facility] Accredited Echocardi[facility]    Prepared and electronically signed by    Kavon Mckeon MD  Signed 03-Aug-2017 17:40:08       No results found for this or any previous visit  No results found for this or any previous visit  No procedure found  No results found for this or any previous visit  Meds/Allergies   all current active meds have been reviewed  Prescriptions Prior to Admission   Medication    aspirin 81 mg chewable tablet    carvedilol (COREG) 25 mg tablet    chlorthalidone 25 mg tablet    magnesium oxide (MAG-OX) 400 mg    omeprazole (PriLOSEC) 20 mg delayed release capsule    potassium chloride (K-DUR,KLOR-CON) 20 mEq tablet            Assessment/Plan:  1   Hypertensive urgency -- blood pressures normalized, continue current medications  Monitor BP's  Pt had echo (ef 60%, grade 1 DD) and stress test (no ischemia) on last admission, no need for further testing  2   Dizziness:  Neurological workup including CT scan of the brain, carotid ultrasound has been normal so far  Probably secondary to hypertension  If her dizziness improves she can probably go home tomorrow from cardiac standpoint    Counseling / Coordination of Care  Total floor / unit time spent today 30  minutes  Greater than 50% of total time was spent with the patient and / or family counseling and / or coordination of care  ** Please Note: Dragon 360 Dictation voice to text software may have been used in the creation of this document   **

## 2017-08-20 NOTE — SUBJECTIVE & OBJECTIVE
VTE Pharmacologic Prophylaxis:   Pharmacologic: None patient is low risk, ambulatory  Mechanical: Mechanical VTE prophylaxis not in place  Patient Centered Rounds: I have performed bedside rounds with nursing staff today  Discussions with Specialists or Other Care Team Provider:  Nephrology, Cardiology  Education and Discussions with Family / Patient:  Patient and daughter  Time Spent for Care: 20 minutes  More than 50% of total time spent on counseling and coordination of care as described above  Current Length of Stay: 1 day(s)  Current Patient Status: Inpatient   Certification Statement: The patient will continue to require additional inpatient hospital stay due to Management of hypertension, dizziness    Discharge Plan:  Home tomorrow if dizziness improve  Code Status: Level 1 - Full Code    Subjective:   Patient reports improved dizziness, she is able to walk without feeling wobbly and her nausea has resolved  She is sitting in chair, her daughter is visiting  Patient reports good appetite    Objective:   Vitals:   Temp (24hrs), Av 4 °F (36 9 °C), Min:98 2 °F (36 8 °C), Max:98 8 °F (37 1 °C)    HR:  [71-84] 71  Resp:  [18] 18  BP: (120-133)/(61-72) 120/61  SpO2:  [94 %-96 %] 94 %  Body mass index is 33 68 kg/m²  Input and Output Summary (last 24 hours): Intake/Output Summary (Last 24 hours) at 17 1705  Last data filed at 17 1300   Gross per 24 hour   Intake              660 ml   Output             1550 ml   Net             -890 ml       Physical Exam:     Physical Exam   Constitutional: She is oriented to person, place, and time  She appears well-developed and well-nourished  No distress  HENT:   Head: Normocephalic and atraumatic  Eyes: Conjunctivae and EOM are normal  Pupils are equal, round, and reactive to light  Neck: Normal range of motion  Neck supple  Cardiovascular: Normal rate and regular rhythm      Pulmonary/Chest: Effort normal and breath sounds normal  Abdominal: Soft  Bowel sounds are normal    Musculoskeletal: Normal range of motion  Neurological: She is alert and oriented to person, place, and time  No cranial nerve deficit  She exhibits normal muscle tone  Coordination normal    Skin: Skin is warm and dry  She is not diaphoretic  Psychiatric: She has a normal mood and affect  Her behavior is normal  Judgment and thought content normal    Nursing note and vitals reviewed  Additional Data:   Labs:    Results from last 7 days  Lab Units 08/14/17  0515  08/13/17  1911   WBC Thousand/uL 4 90  --  5 05   HEMOGLOBIN g/dL 11 7  --  12 8   HEMATOCRIT % 36 7  --  39 7   PLATELETS Thousands/uL 166  < > 170   NEUTROS PCT %  --   --  51   LYMPHS PCT %  --   --  35   MONOS PCT %  --   --  11   EOS PCT %  --   --  3   < > = values in this interval not displayed  Results from last 7 days  Lab Units 08/20/17 0448  08/13/17  1911   SODIUM mmol/L 142  < > 141   POTASSIUM mmol/L 3 1*  < > 3 3*   CHLORIDE mmol/L 103  < > 103   CO2 mmol/L 30  < > 32   BUN mg/dL 11  < > 21   CREATININE mg/dL 1 09  < > 1 41*   CALCIUM mg/dL 9 5  < > 9 7   TOTAL PROTEIN g/dL  --   --  7 6   BILIRUBIN TOTAL mg/dL  --   --  0 70   ALK PHOS U/L  --   --  77   ALT U/L  --   --  24   AST U/L  --   --  22   GLUCOSE RANDOM mg/dL 96  < > 120   < > = values in this interval not displayed  Results from last 7 days  Lab Units 08/14/17 0515   INR  0 96       * I Have Reviewed All Lab Data Listed Above  * Additional Pertinent Lab Tests Reviewed:  All Labs Within Last 24 Hours Reviewed    Imaging:    Imaging Reports Reviewed Today Include:  None new    Cultures:   Blood Culture: No results found for: BLOODCX  Urine Culture: No results found for: URINECX  Sputum Culture: No components found for: SPUTUMCX  Wound Culture: No results found for: WOUNDCULT    Last 24 Hours Medication List:     aspirin 81 mg Oral Daily   chlorthalidone 25 mg Oral Daily   labetalol 400 mg Oral Q12H [facility]   potassium chloride 40 mEq Oral Daily   scopolamine 1 patch Transdermal Q72H        Today, Patient Was Seen By: Helder Young MD    ** Please Note: Dragon 360 Dictation voice to text software may have been used in the creation of this document   **

## 2017-08-21 ENCOUNTER — APPOINTMENT (INPATIENT)
Dept: CT IMAGING | Facility: HOSPITAL | Age: 72
DRG: 304 | End: 2017-08-21
Payer: COMMERCIAL

## 2017-08-21 LAB
ANION GAP SERPL CALCULATED.3IONS-SCNC: 8 MMOL/L (ref 4–13)
BUN SERPL-MCNC: 19 MG/DL (ref 5–25)
CALCIUM SERPL-MCNC: 9.2 MG/DL (ref 8.3–10.1)
CHLORIDE SERPL-SCNC: 103 MMOL/L (ref 100–108)
CO2 SERPL-SCNC: 30 MMOL/L (ref 21–32)
CREAT SERPL-MCNC: 1.04 MG/DL (ref 0.6–1.3)
GFR SERPL CREATININE-BSD FRML MDRD: 62 ML/MIN/1.73SQ M
GLUCOSE SERPL-MCNC: 95 MG/DL (ref 65–140)
MAGNESIUM SERPL-MCNC: 1.6 MG/DL (ref 1.6–2.6)
POTASSIUM SERPL-SCNC: 2.9 MMOL/L (ref 3.5–5.3)
POTASSIUM SERPL-SCNC: 3.1 MMOL/L (ref 3.5–5.3)
SODIUM SERPL-SCNC: 141 MMOL/L (ref 136–145)

## 2017-08-21 PROCEDURE — 83735 ASSAY OF MAGNESIUM: CPT | Performed by: INTERNAL MEDICINE

## 2017-08-21 PROCEDURE — 84132 ASSAY OF SERUM POTASSIUM: CPT | Performed by: FAMILY MEDICINE

## 2017-08-21 PROCEDURE — 70486 CT MAXILLOFACIAL W/O DYE: CPT

## 2017-08-21 PROCEDURE — 80048 BASIC METABOLIC PNL TOTAL CA: CPT | Performed by: INTERNAL MEDICINE

## 2017-08-21 RX ORDER — POTASSIUM CHLORIDE 14.9 MG/ML
20 INJECTION INTRAVENOUS ONCE
Status: COMPLETED | OUTPATIENT
Start: 2017-08-21 | End: 2017-08-21

## 2017-08-21 RX ORDER — POTASSIUM CHLORIDE 29.8 MG/ML
40 INJECTION INTRAVENOUS ONCE
Status: DISCONTINUED | OUTPATIENT
Start: 2017-08-21 | End: 2017-08-21

## 2017-08-21 RX ORDER — MAGNESIUM SULFATE HEPTAHYDRATE 40 MG/ML
2 INJECTION, SOLUTION INTRAVENOUS ONCE
Status: COMPLETED | OUTPATIENT
Start: 2017-08-21 | End: 2017-08-23

## 2017-08-21 RX ADMIN — ASPIRIN 81 MG 81 MG: 81 TABLET ORAL at 09:05

## 2017-08-21 RX ADMIN — MAGNESIUM SULFATE HEPTAHYDRATE 2 G: 40 INJECTION, SOLUTION INTRAVENOUS at 11:42

## 2017-08-21 RX ADMIN — LABETALOL HYDROCHLORIDE 400 MG: 100 TABLET, FILM COATED ORAL at 09:05

## 2017-08-21 RX ADMIN — POTASSIUM CHLORIDE 20 MEQ: 200 INJECTION, SOLUTION INTRAVENOUS at 09:05

## 2017-08-21 RX ADMIN — POTASSIUM CHLORIDE 40 MEQ: 1500 TABLET, EXTENDED RELEASE ORAL at 09:05

## 2017-08-21 RX ADMIN — LABETALOL HYDROCHLORIDE 400 MG: 100 TABLET, FILM COATED ORAL at 20:14

## 2017-08-21 RX ADMIN — CHLORTHALIDONE 25 MG: 25 TABLET ORAL at 09:06

## 2017-08-21 NOTE — SUBJECTIVE & OBJECTIVE
VTE Pharmacologic Prophylaxis:   Pharmacologic: Low VTErisk, ambulatory  Mechanical: Mechanical VTE prophylaxis not in place  Patient Centered Rounds: I have performed bedside rounds with nursing staff today  Discussions with Specialists or Other Care Team Provider:  Cardiology, Nephrology  Education and Discussions with Family / Patient:  Patient  Time Spent for Care: 25  More than 50% of total time spent on counseling and coordination of care as described above  Current Length of Stay: 2 day(s)  Current Patient Status: Inpatient   Certification Statement: The patient will continue to require additional inpatient hospital stay due to Management of symptomatic dizziness    Discharge Plan:  Home tomorrow improved  Code Status: Level 1 - Full Code    Subjective:   No changes in dizziness overnight, patient does admit to positional component, she states she is still unsteady when she walks  PT did clear her for discharge to home  She denies any tinnitus or hearing loss, denies any otalgia    Objective:   Vitals:   Temp (24hrs), Av 4 °F (36 9 °C), Min:98 2 °F (36 8 °C), Max:98 8 °F (37 1 °C)    HR:  [62-73] 73  Resp:  [18] 18  BP: (120-133)/(61-71) 133/71  SpO2:  [94 %] 94 %  Body mass index is 33 68 kg/m²  Input and Output Summary (last 24 hours): Intake/Output Summary (Last 24 hours) at 17 1332  Last data filed at 17 0905   Gross per 24 hour   Intake              180 ml   Output             1250 ml   Net            -1070 ml       Physical Exam:     Physical Exam   Constitutional: She is oriented to person, place, and time  She appears well-developed and well-nourished  No distress  HENT:   Head: Normocephalic and atraumatic  Right Ear: External ear normal    Left Ear: External ear normal    Nose: Nose normal    Mouth/Throat: Oropharynx is clear and moist  No oropharyngeal exudate  Eyes: Conjunctivae and EOM are normal  Pupils are equal, round, and reactive to light  Neck: Normal range of motion  Neck supple  Cardiovascular: Normal rate, regular rhythm and normal heart sounds  Pulmonary/Chest: Effort normal and breath sounds normal    Abdominal: Soft  Musculoskeletal: She exhibits no edema or tenderness  Neurological: She is alert and oriented to person, place, and time  No cranial nerve deficit  Skin: Skin is warm and dry  She is not diaphoretic  No pallor  Psychiatric: She has a normal mood and affect  Her behavior is normal  Judgment and thought content normal    Nursing note and vitals reviewed  Additional Data:   Labs:        Results from last 7 days  Lab Units 08/21/17  0430   SODIUM mmol/L 141   POTASSIUM mmol/L 2 9*   CHLORIDE mmol/L 103   CO2 mmol/L 30   BUN mg/dL 19   CREATININE mg/dL 1 04   CALCIUM mg/dL 9 2   GLUCOSE RANDOM mg/dL 95           * I Have Reviewed All Lab Data Listed Above  * Additional Pertinent Lab Tests Reviewed: [facility]    Imaging:    Imaging Reports Reviewed Today Include:  CT head from August 2nd    Cultures:   Blood Culture: No results found for: BLOODCX  Urine Culture: No results found for: URINECX  Sputum Culture: No components found for: SPUTUMCX  Wound Culture: No results found for: WOUNDCULT    Last 24 Hours Medication List:     aspirin 81 mg Oral Daily   chlorthalidone 25 mg Oral Daily   labetalol 400 mg Oral Q12H [address]   magnesium sulfate 2 g Intravenous Once   potassium chloride 40 mEq Oral Daily   scopolamine 1 patch Transdermal Q72H        Today, Patient Was Seen By: Michelle Lugo MD    ** Please Note: Dragon 360 Dictation voice to text software may have been used in the creation of this document   **

## 2017-08-21 NOTE — PROGRESS NOTES
NEPHROLOGY PROGRESS NOTE    Patient: Maday Adjutant               Sex: female          DOA: 8/18/2017  7:06 AM   YOB: 1945        Age:  66 y o         LOS:  LOS: 2 days     REASON FOR THE CONSULTATION:  Further management of hypertension    HPI     This is a 22-year-old female admitted for further management of dizziness and also found to have hypertensive urgency on admission  Nephrology were consulted for further management of hypertension     SUBJECTIVE     - continue to feel dizzy upon ambulation  Found to be afebrile and nonoliguric overnight  Patient denies nausea / vomiting / headache / SOB / chest pain today    - Reviewed last 24 hrs events     CURRENT MEDICATIONS       Current Facility-Administered Medications:     acetaminophen (TYLENOL) tablet 650 mg, 650 mg, Oral, Q6H PRN, Suleiman Stanford MD    aspirin chewable tablet 81 mg, 81 mg, Oral, Daily, Suleiman Stanford MD, 81 mg at 08/21/17 0905    chlorthalidone tablet 25 mg, 25 mg, Oral, Daily, Fang Gamble MD, 25 mg at 08/21/17 0906    hydrALAZINE (APRESOLINE) injection 10 mg, 10 mg, Intravenous, Q4H PRN, Randal Sahu PA-C, 10 mg at 08/18/17 2342    labetalol (NORMODYNE) tablet 400 mg, 400 mg, Oral, Q12H [facility], Farzana Ambrocio MD, 400 mg at 08/21/17 0905    magnesium sulfate 2 g/50 mL IVPB (premix) 2 g, 2 g, Intravenous, Once, Farzana Ambrocio MD    ondansetron [facility]) injection 4 mg, 4 mg, Intravenous, Q4H PRN, PATRICK Espinoza, 4 mg at 08/19/17 2024    potassium chloride (K-DUR,KLOR-CON) CR tablet 40 mEq, 40 mEq, Oral, Daily, Farzana Ambrocio MD, 40 mEq at 08/21/17 0905    scopolamine (TRANSDERM-SCOP) 1 5 mg/3 days TD 72 hr patch 1 patch, 1 patch, Transdermal, Q72H, Suleiman Stnaford MD, 1 patch at 08/19/17 1527    OBJECTIVE     Current Weight: Weight - Scale: 89 kg (196 lb 3 2 oz)  Vitals:    08/21/17 0700   BP: 133/71   Pulse: 73   Resp: 18   Temp: 98 2 °F (36 8 °C)   SpO2: 94% Intake/Output Summary (Last 24 hours) at 08/21/17 1137  Last data filed at 08/21/17 0905   Gross per 24 hour   Intake              420 ml   Output             1350 ml   Net             -930 ml       PHYSICAL EXAMINATION     GEN:  Awake and not in acute distress  RS:  Bilateral equal air entry, no wheezing  CVS:  S1-S2 heard  Abdomen:  Soft, nontender, nondistended, positive bowel sounds  Extremities:  No edema, skin is warm on touch  CNS:  Awake and follows commands  HEENT:  Pink conjunctiva, no JVD, head is atraumatic  Psychiatric:  Normal mood and affect, not suicidal  Musculoskeletal:  No gross bony deformity seen  Lymph Node:  No palpable cervical lymphadenopathy    LAB RESULTS       Results from last 7 days  Lab Units 08/21/17  0430 08/20/17  0448 08/19/17 2007 08/19/17  0507 08/18/17  0728 08/15/17  0545   SODIUM mmol/L 141 142  --  139 143 140   POTASSIUM mmol/L 2 9* 3 1* 3 0* 2 6* 3 5 3 5   CHLORIDE mmol/L 103 103  --  102 103 105   CO2 mmol/L 30 30  --  30 30 30   BUN mg/dL 19 11  --  10 11 11   CREATININE mg/dL 1 04 1 09  --  0 91 1 02 0 93   EGFR ml/min/1 73sq m 62 58  --  72 63 71   CALCIUM mg/dL 9 2 9 5  --  9 5 9 8 9 0   MAGNESIUM mg/dL 1 6 1 7  --   --  1 8  --    GLUCOSE RANDOM mg/dL 95 96  --  116 113 103       RADIOLOGY RESULTS      No results found for this or any previous visit  Results for orders placed during the hospital encounter of 08/18/17   XR chest pa & lateral    Narrative CHEST     INDICATION:  Hypertension/dizziness    COMPARISON:  August 13, 2017    VIEWS:  Frontal and lateral projections    IMAGES:  2    FINDINGS:         Cardiomediastinal silhouette appears unremarkable  The lungs are clear  No pneumothorax or pleural effusion  Visualized osseous structures appear within normal limits for the patient's age  Impression No acute consolidation or congestion seen  Workstation performed: [id number]         PLAN / RECOMMENDATIONS      1  Hypertension    Essential and chronic    Renal artery Doppler showed no signs of renal artery stenosis  Currently patient is on chlorthalidone 25 mg PO daily along with labetalol 400 mg PO BID and overnight patient's blood pressure was found to be controlled and at goal   Plan to continue current antihypertensive regimen today and monitor hypertension  2   Hypokalemia  Iatrogenic most likely secondary to use of chlorthalidone  Current potassium level is 2 9 and patient has received IV potassium supplement 20 mEq in the morning  Plan to continue 40 mEq PO KCl daily  Plan to recheck potassium level at 1400 today       3   Hypomagnesemia  Is multifactorial and suspected secondary to use of PPIs  Current magnesium is 1 6 which is below the goal and plan to give 2 g of magnesium sulfate IV x1 dose  Continue to avoid PPIs at this point and would advised to take PPI when necessary for symptomatic management for GERD  Disposition:  Stable from renal standpoint for discharge if potassium level in the afternoon found to be at goal    Plan was also d/w Mauri Vargas MD  Nephrology  8/21/2017        Portions of the record may have been created with voice recognition software  Occasional wrong word or "sound a like" substitutions may have occurred due to the inherent limitations of voice recognition software  Read the chart carefully and recognize, using context, where substitutions have occurred

## 2017-08-21 NOTE — SUBJECTIVE & OBJECTIVE
VTE Pharmacologic Prophylaxis:   Pharmacologic: Low VTE risk score, patient ambulatory  Mechanical: Mechanical VTE prophylaxis in place  Patient Centered Rounds: I have performed bedside rounds with nursing staff today  Discussions with Specialists or Other Care Team Provider:  Nephrology  Education and Discussions with Family / Patient:  Patient  Time Spent for Care: 20 minutes  More than 50% of total time spent on counseling and coordination of care as described above  Current Length of Stay: 2 day(s)  Current Patient Status: Inpatient   Certification Statement: The patient will continue to require additional inpatient hospital stay due to Management of hypokalemia    Discharge Plan:  Home when potassium is normalized, anticipate tomorrow morning  Code Status: Level 1 - Full Code    Subjective:   No events overnight, dizziness is almost resolved, patient denies any headache, chest pain, palpitations    Objective:   Vitals:   Temp (24hrs), Av 4 °F (36 9 °C), Min:98 2 °F (36 8 °C), Max:98 8 °F (37 1 °C)    HR:  [62-73] 73  Resp:  [18] 18  BP: (120-133)/(61-71) 133/71  SpO2:  [94 %] 94 %  Body mass index is 33 68 kg/m²  Input and Output Summary (last 24 hours): Intake/Output Summary (Last 24 hours) at 17 0835  Last data filed at 17 0441   Gross per 24 hour   Intake              660 ml   Output             1800 ml   Net            -1140 ml       Physical Exam:     Physical Exam   Constitutional: She is oriented to person, place, and time  She appears well-developed and well-nourished  No distress  HENT:   Head: Normocephalic and atraumatic  Nose: Nose normal    Eyes: EOM are normal  Pupils are equal, round, and reactive to light  Neck: Normal range of motion  Neck supple  Cardiovascular: Normal rate and regular rhythm  No murmur heard  Pulmonary/Chest: Effort normal and breath sounds normal    Abdominal: Soft  Musculoskeletal: Normal range of motion     Neurological: She is alert and oriented to person, place, and time  She exhibits normal muscle tone  Coordination normal    Skin: Skin is warm and dry  She is not diaphoretic  Psychiatric: She has a normal mood and affect  Her behavior is normal  Judgment and thought content normal    Nursing note and vitals reviewed  Additional Data:   Labs:        Results from last 7 days  Lab Units 08/21/17  0430   SODIUM mmol/L 141   POTASSIUM mmol/L 2 9*   CHLORIDE mmol/L 103   CO2 mmol/L 30   BUN mg/dL 19   CREATININE mg/dL 1 04   CALCIUM mg/dL 9 2   GLUCOSE RANDOM mg/dL 95           * I Have Reviewed All Lab Data Listed Above  * Additional Pertinent Lab Tests Reviewed: All Labs Within Last 24 Hours Reviewed    Imaging:    Imaging Reports Reviewed Today Include:  None new    Cultures:   Blood Culture: No results found for: BLOODCX  Urine Culture: No results found for: URINECX  Sputum Culture: No components found for: SPUTUMCX  Wound Culture: No results found for: WOUNDCULT    Last 24 Hours Medication List:     aspirin 81 mg Oral Daily   chlorthalidone 25 mg Oral Daily   labetalol 400 mg Oral Q12H [facility] half-way   potassium chloride 40 mEq Oral Daily   potassium chloride 20 mEq Intravenous Once   scopolamine 1 patch Transdermal Q72H        Today, Patient Was Seen By: Roxane Hanson MD    ** Please Note: Dragon 360 Dictation voice to text software may have been used in the creation of this document   **

## 2017-08-21 NOTE — PLAN OF CARE
DISCHARGE PLANNING - CARE MANAGEMENT     Discharge to post-acute care or home with appropriate resources Not Progressing        Potential for Falls     Patient will remain free of falls Not Progressing

## 2017-08-21 NOTE — PROGRESS NOTES
General Cardiology   Progress Note   Hector Ley 66 y o  female MRN: [id number]  Unit/Bed#: -01 Encounter: [id number]      SUBJECTIVE:   No significant events overnight  Im feeling ok  I still have some dizziness with movement  REVIEW OF SYSTEMS:  Constitutional:  Denies fever or chills   Eyes:  Denies change in visual acuity   HENT:  Denies nasal congestion or sore throat   Respiratory:  Denies cough or shortness of breath   Cardiovascular:  Denies chest pain or edema   GI:  Denies abdominal pain, nausea, vomiting, bloody stools or diarrhea   :  Denies dysuria, frequency, difficulty in micturition and nocturia  Musculoskeletal:  Denies back pain or joint pain   Neurologic: +dizziness Denies headache, focal weakness or sensory changes   Endocrine:  Denies polyuria or polydipsia   Lymphatic:  Denies swollen glands   Psychiatric:  Denies depression or anxiety     OBJECTIVE:   Vitals:  Vitals:    08/21/17 0700   BP: 133/71   Pulse: 73   Resp: 18   Temp: 98 2 °F (36 8 °C)   SpO2: 94%     Body mass index is 33 68 kg/m²  Systolic (14OOC), PZX:354 , Min:120 , BDS:034     Diastolic (69VTS), IKN:08, Min:61, Max:71      Intake/Output Summary (Last 24 hours) at 08/21/17 1035  Last data filed at 08/21/17 4249   Gross per 24 hour   Intake              420 ml   Output             1800 ml   Net            -1380 ml     Weight (last 2 days)     Date/Time   Weight    08/21/17 0541  89 (196 2)    08/20/17 0600  89 (196 21)    08/19/17 0531  89 2 (196 65)              Telemetry Review: non tele    PHYSICAL EXAMS:  General:  Patient is not in acute distress, laying in the bed comfortably, awake, alert responding to commands  Head: Normocephalic, Atraumatic  HEENT:  Both pupils normal-size atraumatic, normocephalic, nonicteric  Neck:  JVP not raised  Trachea central  Respiratory:  Bronchovascular breathing all over the chest without any accompaniment  Cardiovascular:  S1 S2 normal RRR  GI:  Abdomen soft nontender  Liver and spleen normal size  Lymphatic:  No cervical or inguinal lymphadenopathy  Neurologic:  Patient is awake alert, responding to command, well-oriented to time and place and person moving     LABORATORY RESULTS:        CBC with diff:       Invalid input(s):  RBC, TOTALCELLSCOUNTED, SEGS%, GRANS%, LYMPHS%, EOS%, BASO%, ABNEUT, ABGRANS, ABLYMPHS, ABMOMOS, ABEOS, ABBASO    CMP:  Results from last 7 days  Lab Units 08/21/17 0430 08/20/17 0448 08/19/17 2007 08/19/17  0507   SODIUM mmol/L 141 142  --  139   POTASSIUM mmol/L 2 9* 3 1* 3 0* 2 6*   CHLORIDE mmol/L 103 103  --  102   CO2 mmol/L 30 30  --  30   ANION GAP mmol/L 8 9  --  7   BUN mg/dL 19 11  --  10   CREATININE mg/dL 1 04 1 09  --  0 91   GLUCOSE RANDOM mg/dL 95 96  --  116   CALCIUM mg/dL 9 2 9 5  --  9 5   EGFR ml/min/1 73sq m 62 58  --  72       BMP:  Results from last 7 days  Lab Units 08/21/17  0430 08/20/17 0448 08/19/17 2007 08/19/17  0507   SODIUM mmol/L 141 142  --  139   POTASSIUM mmol/L 2 9* 3 1* 3 0* 2 6*   CHLORIDE mmol/L 103 103  --  102   CO2 mmol/L 30 30  --  30   BUN mg/dL 19 11  --  10   CREATININE mg/dL 1 04 1 09  --  0 91   GLUCOSE RANDOM mg/dL 95 96  --  116   CALCIUM mg/dL 9 2 9 5  --  9 5              Results from last 7 days  Lab Units 08/21/17  0430 08/20/17 0448 08/18/17  0728   MAGNESIUM mg/dL 1 6 1 7 1 8           Results from last 7 days  Lab Units 08/18/17  0954   TSH 3RD GENERATON uIU/mL 2 309           Lipid Profile:   Lab Results   Component Value Date    CHOL 187 08/14/2017     Lab Results   Component Value Date    HDL 67 (H) 08/14/2017     Lab Results   Component Value Date    LDLCALC 110 (H) 08/14/2017     Lab Results   Component Value Date    TRIG 50 08/14/2017       Cardiac testing:  Results for orders placed during the hospital encounter of 08/02/17   Echo complete with contrast if indicated    Narrative [address]  [address] [address]  [phone]    Transthoracic Echocardiogram  2D, M-mode, Doppler, and Color Doppler    Study date:  03-Aug-2017    Patient: Bob Phan  MR number: [id number]  Account number: [de-identified]  : 1945  Age: 66 years  Gender: Female  Status: Outpatient  Location: Bedside  Height: 64 in  Weight: 205 lb  BP: 116/ 62 mmHg    Indications: Hypertensive heart disease, Assess left ventricular function  Diagnoses: I11 9 - Hypertensive heart disease without heart failure    Sonographer:   Elton Bryant RDCS  Interpreting Physician:  Drake Reynolds MD  Referring Physician:  Ramsey Rai PA-C  Group:  [facility]    SUMMARY    PROCEDURE INFORMATION:  This was a technically difficult study  Echocardiographic views were limited due to poor acoustic window availability, decreased penetration, and lung interference  LEFT VENTRICLE:  Systolic function was normal  Ejection fraction was estimated to be 60 %  There were no regional wall motion abnormalities  Doppler parameters were consistent with abnormal left ventricular relaxation (grade 1 diastolic dysfunction)  RIGHT VENTRICLE:  The size was normal   Systolic function was normal     TRICUSPID VALVE:  There was mild regurgitation  Pulmonary artery systolic pressure was within the normal range  HISTORY: Dizziness  PRIOR HISTORY: Risk factors: hypertension and morbid obesity  PROCEDURE: The procedure was performed at the bedside  This was a routine study  The transthoracic approach was used  The study included complete 2D imaging, M-mode, complete spectral Doppler, and color Doppler  The heart rate was 63 bpm,  at the start of the study  Images were obtained from the parasternal, apical, and subcostal acoustic windows  Images were not obtained from the suprasternal notch acoustic windows  Echocardiographic views were limited due to poor acoustic  window availability, decreased penetration, and lung interference   This was a technically difficult study  LEFT VENTRICLE: Size was normal  Systolic function was normal  Ejection fraction was estimated to be 60 %  There were no regional wall motion abnormalities  Wall thickness was normal  No evidence of apical thrombus  DOPPLER: Doppler  parameters were consistent with abnormal left ventricular relaxation (grade 1 diastolic dysfunction)  RIGHT VENTRICLE: The size was normal  Systolic function was normal  Wall thickness was normal     LEFT ATRIUM: Size was normal     RIGHT ATRIUM: Size was normal     MITRAL VALVE: Valve structure was normal  There was normal leaflet separation  DOPPLER: The transmitral velocity was within the normal range  There was no evidence for stenosis  There was no significant regurgitation  AORTIC VALVE: The valve was trileaflet  Leaflets exhibited mildly increased thickness and normal cuspal separation  DOPPLER: Transaortic velocity was within the normal range  There was no evidence for stenosis  There was no significant  regurgitation  TRICUSPID VALVE: The valve structure was normal  There was normal leaflet separation  DOPPLER: The transtricuspid velocity was within the normal range  There was no evidence for stenosis  There was mild regurgitation  Pulmonary artery  systolic pressure was within the normal range  PULMONIC VALVE: Leaflets exhibited normal thickness, no calcification, and normal cuspal separation  DOPPLER: The transpulmonic velocity was within the normal range  There was no significant regurgitation  PERICARDIUM: There was no pericardial effusion  The pericardium was normal in appearance  AORTA: The root exhibited normal size  SYSTEMIC VEINS: IVC: The inferior vena cava was normal in size   Respirophasic changes were normal     SYSTEM MEASUREMENT TABLES    2D  %FS: 33 6 %  Ao Diam: 2 6 cm  EDV(Teich): 92 8 ml  EF(Teich): 62 5 %  ESV(Teich): 34 8 ml  IVSd: 0 9 cm  LA Area: 13 4 cm2  LA Diam: 3 1 cm  LVEDV MOD A4C: 105 3 ml  LVEF MOD A4C: 72 2 %  LVESV MOD A4C: 29 2 ml  LVIDd: 4 5 cm  LVIDs: 3 cm  LVLd A4C: 7 3 cm  LVLs A4C: 6 cm  LVPWd: 0 8 cm  RA Area: 12 6 cm2  RVIDd: 3 9 cm  SV MOD A4C: 76 ml  SV(Teich): 58 ml    CW  TR Vmax: 2 8 m/s  TR maxP 8 mmHg    MM  TAPSE: 2 4 cm    PW  E': 0 1 m/s  E/E': 13 7  MV A Darrell: 1 m/s  MV Dec Sabine: 2 4 m/s2  MV DecT: 335 1 ms  MV E Darrell: 0 8 m/s  MV E/A Ratio: 0 8  MV PHT: 97 2 ms  MVA By PHT: 2 3 cm2    [facility] Accredited [facility]chocardi[facility]    Prepared and electronically signed by    Brandon Herrera MD  Signed 03-Aug-2017 17:40:08           Meds/Allergies   all current active meds have been reviewed and current meds:   Current Facility-Administered Medications   Medication Dose Route Frequency    acetaminophen (TYLENOL) tablet 650 mg  650 mg Oral Q6H PRN    aspirin chewable tablet 81 mg  81 mg Oral Daily    chlorthalidone tablet 25 mg  25 mg Oral Daily    hydrALAZINE (APRESOLINE) injection 10 mg  10 mg Intravenous Q4H PRN    labetalol (NORMODYNE) tablet 400 mg  400 mg Oral Q12H [address]    magnesium sulfate 2 g/50 mL IVPB (premix) 2 g  2 g Intravenous Once    ondansetron (ZOFRAN) injection 4 mg  4 mg Intravenous Q4H PRN    potassium chloride (K-DUR,KLOR-CON) CR tablet 40 mEq  40 mEq Oral Daily    potassium chloride 20 mEq IVPB (premix)  20 mEq Intravenous Once    scopolamine (TRANSDERM-SCOP) 1 5 mg/3 days TD 72 hr patch 1 patch  1 patch Transdermal Q72H     Prescriptions Prior to Admission   Medication    aspirin 81 mg chewable tablet    carvedilol (COREG) 25 mg tablet    chlorthalidone 25 mg tablet    magnesium oxide (MAG-OX) 400 mg    omeprazole (PriLOSEC) 20 mg delayed release capsule    potassium chloride (K-DUR,KLOR-CON) 20 mEq tablet          ASSESSMENT & PLAN   1  HTN -- stable  Last bp 133/71  Continue all meds -- chlorthalidone 25mg qd, labetalol 400mg bid  Echo done ef 60%  Monitor vitals    2    Dizziness -- Neuro w/u done-- ct scan and carotid normal  Likely secondary to HTN      Chula Sewell PA-C  8/21/2017,10:35 AM    Portions of the record may have been created with voice recognition software   Occasional wrong word or "sound a like" substitutions may have occurred due to the inherent limitations of voice recognition software   Read the chart carefully and recognize, using context, where substitutions have occurred

## 2017-08-22 LAB
ANION GAP SERPL CALCULATED.3IONS-SCNC: 6 MMOL/L (ref 4–13)
BUN SERPL-MCNC: 13 MG/DL (ref 5–25)
CALCIUM SERPL-MCNC: 9.1 MG/DL (ref 8.3–10.1)
CHLORIDE SERPL-SCNC: 103 MMOL/L (ref 100–108)
CO2 SERPL-SCNC: 30 MMOL/L (ref 21–32)
CREAT SERPL-MCNC: 0.94 MG/DL (ref 0.6–1.3)
GFR SERPL CREATININE-BSD FRML MDRD: 70 ML/MIN/1.73SQ M
GLUCOSE SERPL-MCNC: 97 MG/DL (ref 65–140)
MAGNESIUM SERPL-MCNC: 1.8 MG/DL (ref 1.6–2.6)
POTASSIUM SERPL-SCNC: 2.8 MMOL/L (ref 3.5–5.3)
SODIUM SERPL-SCNC: 139 MMOL/L (ref 136–145)

## 2017-08-22 PROCEDURE — 83735 ASSAY OF MAGNESIUM: CPT | Performed by: FAMILY MEDICINE

## 2017-08-22 PROCEDURE — 80048 BASIC METABOLIC PNL TOTAL CA: CPT | Performed by: FAMILY MEDICINE

## 2017-08-22 RX ORDER — POTASSIUM CHLORIDE 29.8 MG/ML
40 INJECTION INTRAVENOUS ONCE
Status: DISCONTINUED | OUTPATIENT
Start: 2017-08-22 | End: 2017-08-22

## 2017-08-22 RX ORDER — POTASSIUM CHLORIDE 20 MEQ/1
40 TABLET, EXTENDED RELEASE ORAL 2 TIMES DAILY
Status: DISCONTINUED | OUTPATIENT
Start: 2017-08-22 | End: 2017-08-25 | Stop reason: HOSPADM

## 2017-08-22 RX ORDER — POTASSIUM CHLORIDE 14.9 MG/ML
20 INJECTION INTRAVENOUS
Status: COMPLETED | OUTPATIENT
Start: 2017-08-22 | End: 2017-08-24

## 2017-08-22 RX ADMIN — POTASSIUM CHLORIDE 20 MEQ: 200 INJECTION, SOLUTION INTRAVENOUS at 13:16

## 2017-08-22 RX ADMIN — POTASSIUM CHLORIDE 40 MEQ: 1500 TABLET, EXTENDED RELEASE ORAL at 17:21

## 2017-08-22 RX ADMIN — POTASSIUM CHLORIDE 40 MEQ: 1500 TABLET, EXTENDED RELEASE ORAL at 08:34

## 2017-08-22 RX ADMIN — SCOPALAMINE 1 PATCH: 1 PATCH, EXTENDED RELEASE TRANSDERMAL at 13:17

## 2017-08-22 RX ADMIN — LABETALOL HYDROCHLORIDE 400 MG: 100 TABLET, FILM COATED ORAL at 20:19

## 2017-08-22 RX ADMIN — LABETALOL HYDROCHLORIDE 400 MG: 100 TABLET, FILM COATED ORAL at 08:34

## 2017-08-22 RX ADMIN — ASPIRIN 81 MG 81 MG: 81 TABLET ORAL at 08:34

## 2017-08-22 RX ADMIN — POTASSIUM CHLORIDE 20 MEQ: 200 INJECTION, SOLUTION INTRAVENOUS at 11:18

## 2017-08-22 RX ADMIN — CHLORTHALIDONE 25 MG: 25 TABLET ORAL at 08:34

## 2017-08-22 NOTE — PROGRESS NOTES
NEPHROLOGY PROGRESS NOTE    Patient: Loretta Abarca               Sex: female          DOA: 8/18/2017  7:06 AM   YOB: 1945        Age:  66 y o         LOS:  LOS: 3 days     REASON FOR THE CONSULTATION:  Further management of hypertension    HPI     This is a 49-year-old female initially admitted for dizziness and found to have hypertensive urgency on admission  Nephrology is following patient for management of hypertension  SUBJECTIVE     - receive IV KCl yesterday  Patient's breathing has remained stable and also found to have nonoliguric overnight  Patient continued to complain of dizziness  Patient denies nausea / vomiting / headache / SOB / chest pain today    - Reviewed last 24 hrs events     CURRENT MEDICATIONS       Current Facility-Administered Medications:     acetaminophen (TYLENOL) tablet 650 mg, 650 mg, Oral, Q6H PRN, Afia Rudolph MD    aspirin chewable tablet 81 mg, 81 mg, Oral, Daily, Afia Rudolph MD, 81 mg at 08/22/17 0834    chlorthalidone tablet 25 mg, 25 mg, Oral, Daily, Susan Victoria MD, 25 mg at 08/22/17 0834    hydrALAZINE (APRESOLINE) injection 10 mg, 10 mg, Intravenous, Q4H PRN, Rui Magaña PA-C, 10 mg at 08/18/17 2342    labetalol (NORMODYNE) tablet 400 mg, 400 mg, Oral, Q12H [address], Champ Quinones MD, 400 mg at 08/22/17 0834    ondansetron (ZOFRAN) injection 4 mg, 4 mg, Intravenous, Q4H PRN, [place] Gravely, CRNP, 4 mg at 08/19/17 2024    potassium chloride (K-DUR,KLOR-CON) CR tablet 40 mEq, 40 mEq, Oral, BID, Champ Quinones MD    scopolamine (TRANSDERM-SCOP) 1 5 mg/3 days TD 72 hr patch 1 patch, 1 patch, Transdermal, Q72H, Afia Rudolph MD, 1 patch at 08/22/17 1317    OBJECTIVE     Current Weight: Weight - Scale: 89 kg (196 lb 3 4 oz)  Vitals:    08/22/17 0749   BP: 128/73   Pulse: 67   Resp: 18   Temp: 98 4 °F (36 9 °C)   SpO2: 96%       Intake/Output Summary (Last 24 hours) at 08/22/17 1534  Last data filed at 08/22/17 1300   Gross per 24 hour   Intake              480 ml   Output             1175 ml   Net             -695 ml       PHYSICAL EXAMINATION     GEN:  Awake and not in acute distress  RS:  Bilateral equal air entry, no wheezing  CVS:  S1-S2 heard  Abdomen:  Soft, nontender, nondistended, positive bowel sounds  Extremities:  No edema, skin is warm on touch  CNS:  Awake and follows commands  HEENT:  No JVD, head is atraumatic  Psychiatric:  Not suicidal  Musculoskeletal:  No gross bony deformity seen  Lymph Node:  No palpable cervical lymphadenopathy    LAB RESULTS       Results from last 7 days  Lab Units 08/22/17  0537 08/21/17  1429 08/21/17  0430 08/20/17  0448 08/19/17 2007 08/19/17  0507 08/18/17  0728   SODIUM mmol/L 139  --  141 142  --  139 143   POTASSIUM mmol/L 2 8* 3 1* 2 9* 3 1* 3 0* 2 6* 3 5   CHLORIDE mmol/L 103  --  103 103  --  102 103   CO2 mmol/L 30  --  30 30  --  30 30   BUN mg/dL 13  --  19 11  --  10 11   CREATININE mg/dL 0 94  --  1 04 1 09  --  0 91 1 02   EGFR ml/min/1 73sq m 70  --  62 58  --  72 63   CALCIUM mg/dL 9 1  --  9 2 9 5  --  9 5 9 8   MAGNESIUM mg/dL 1 8  --  1 6 1 7  --   --  1 8   GLUCOSE RANDOM mg/dL 97  --  95 96  --  116 113       RADIOLOGY RESULTS      Results for orders placed during the hospital encounter of 08/18/17   XR chest pa & lateral    Narrative CHEST     INDICATION:  Hypertension/dizziness    COMPARISON:  August 13, 2017    VIEWS:  Frontal and lateral projections    IMAGES:  2    FINDINGS:         Cardiomediastinal silhouette appears unremarkable  The lungs are clear  No pneumothorax or pleural effusion  Visualized osseous structures appear within normal limits for the patient's age  Impression No acute consolidation or congestion seen  Workstation performed: [id number]         PLAN / RECOMMENDATIONS      1  Hypertension  Essential, renal artery Doppler showed no signs of renal artery stenosis      Currently patient is on chlorthalidone 25 mg PO daily along with labetalol 400 mg PO BID  Overnight patient's blood pressure has remained controlled and at goal and plan to continue current antihypertensive regimen and monitor hypertension today  2   Hypokalemia  Suspect iatrogenic due to use of chlorthalidone  Current potassium level is 2 8 which is below the goal   Plan to increase potassium chloride to 40 mEq PO BID  Plan to give potassium chloride 40 mEq IV x1 dose now  Recheck potassium level tomorrow  3   Hypomagnesemia  Off PPIs  Received IV magnesium sulfate yesterday and magnesium level has improved to 1 8 which is at goal   Plan to recheck magnesium level again tomorrow  Plan was also d/w Leo Duong MD  Nephrology  8/22/2017        Portions of the record may have been created with voice recognition software  Occasional wrong word or "sound a like" substitutions may have occurred due to the inherent limitations of voice recognition software  Read the chart carefully and recognize, using context, where substitutions have occurred

## 2017-08-22 NOTE — PROGRESS NOTES
Pt asleep in bed  No visible signs of distress noted at this time  Bed low and locked; side rails up; call bell within reach  Will continue to monitor

## 2017-08-22 NOTE — CASE MANAGEMENT
ATTN:  CLINICAL REVIEW - Additional info as requested    8/18/2017 8/19:  Pt, rec'd KCL 20 IV x 2  And  20 po x 1   For K 2 6    8/20  ATTENDING NOTE:  Dizziness   Overview     Present prior to admission, suspect a combination of side effect of Coreg and patient has history of positional dizziness and motion sickness  Today's improved since labetalol has been increased and patient has been on scopolamine patch  Monitor overnight, possibly discharge tomorrow if improved     Hypomagnesemia   Overview     Along with hypokalemia, currently resolved   Hypokalemia   Overview     Patient is on chlorthalidone, no renal artery stenosis, per Nephrology daily supplement   * HTN (hypertension)   Overview     Presented with urgency, which has resolved, patient changed from Coreg to labetalol with increased dose, tolerating well, dizziness is improving  No evidence of carotid or renal artery stenosis        K 3 1  - Added KCL 40 po q d    [facility] in the [employer] [facility] [place] for 2017  Network Utilization Review Department  Phone: [phone]; Fax [phone]  ATTENTION: The Network Utilization Review Department is now centralized for our 7 Facilities  Make a note that we have a new phone and fax numbers for our Department  Please call with any questions or concerns to [phone] and carefully follow the prompts so that you are directed to the right person  All voicemails are confidential  Fax any determinations, approvals, denials, and requests for initial or continue stay review clinical to [phone]  Due to HIGH CALL volume, it would be easier if you could please send faxed requests to expedite your requests and in part, help us provide discharge notifications faster

## 2017-08-22 NOTE — PROGRESS NOTES
General Cardiology   Progress Note   Creston Angry 66 y o  female MRN: [id number]  Unit/Bed#: -01 Encounter: [id number]        Subjective:    No significant events since the last encounter  Patient continues to feel dizzy  Blood pressures normalized  Objective:   Vitals:  Vitals:    08/22/17 0749   BP: 128/73   Pulse: 67   Resp: 18   Temp: 98 4 °F (36 9 °C)   SpO2: 96%       Body mass index is 33 68 kg/m²  Systolic (78MSN), DZJ:281 , Min:112 , TKX:360     Diastolic (02XAC), BPI:46, Min:62, Max:73      Intake/Output Summary (Last 24 hours) at 08/22/17 1113  Last data filed at 08/22/17 1105   Gross per 24 hour   Intake              360 ml   Output             1475 ml   Net            -1115 ml     Weight (last 2 days)     Date/Time   Weight    08/22/17 0537  89 (196 21)    08/21/17 0541  89 (196 2)    08/20/17 0600  89 (196 21)              PHYSICAL EXAMS:  General:  Patient is not in acute distress, laying in the bed comfortably, awake, alert responding to commands  Head: Normocephalic, Atraumatic  HEENT: White sclera, pink conjunctiva,  PERRLA,pharynx benign  Neck:  Supple, no neck vein distention, carotids+2/+2 no bruits, thyromegaly, adenopathy  Respiratory: clear to P/A  Cardiovascular:  PMI normal, S1-S2 normal, No  Murmurs, thrills, gallops, rubs   Regular rhythm  GI:  Abdomen soft nontender   No hepatosplenomegaly, adenopathy, ascites,or rebound tenderness  Extremities: No edema, normal pulses, no calf tenderness, no joint deformities, no venous disease   Integument:  No skin rashes or ulceration  Lymphatic:  No cervical or inguinal lymphadenopathy  Neurologic:  Patient is awake alert, responding to command, well-oriented to time and place and person moving all extremities      LABORATORY RESULTS:      CBC with diff:       Invalid input(s):  RBC, TOTALCELLSCOUNTED, SEGS%, GRANS%, LYMPHS%, EOS%, BASO%, ABNEUT, ABGRANS, ABLYMPHS, ABMOMOS, ABEOS, ABBASO    CMP:  Results from last 7 days  Lab Units 17  0537 17  1429 17  0430 17  0448   SODIUM mmol/L 139  --  141 142   POTASSIUM mmol/L 2 8* 3 1* 2 9* 3 1*   CHLORIDE mmol/L 103  --  103 103   CO2 mmol/L 30  --  30 30   ANION GAP mmol/L 6  --  8 9   BUN mg/dL 13  --  19 11   CREATININE mg/dL 0 94  --  1 04 1 09   GLUCOSE RANDOM mg/dL 97  --  95 96   CALCIUM mg/dL 9 1  --  9 2 9 5   EGFR ml/min/1 73sq m 70  --  62 58       BMP:  Results from last 7 days  Lab Units 17  0537 17  1429 17  0430 17  0448   SODIUM mmol/L 139  --  141 142   POTASSIUM mmol/L 2 8* 3 1* 2 9* 3 1*   CHLORIDE mmol/L 103  --  103 103   CO2 mmol/L 30  --  30 30   BUN mg/dL 13  --  19 11   CREATININE mg/dL 0 94  --  1 04 1 09   GLUCOSE RANDOM mg/dL 97  --  95 96   CALCIUM mg/dL 9 1  --  9 2 9 5              Results from last 7 days  Lab Units 17  0537 17  0430 17  0448 17  0728   MAGNESIUM mg/dL 1 8 1 6 1 7 1 8           Results from last 7 days  Lab Units 17  0954   TSH 3RD GENERATON uIU/mL 2 309           Lipid Profile:   Lab Results   Component Value Date    CHOL 187 2017     Lab Results   Component Value Date    HDL 67 (H) 2017     Lab Results   Component Value Date    LDLCALC 110 (H) 2017     Lab Results   Component Value Date    TRIG 50 2017       Cardiac testing:   Results for orders placed during the hospital encounter of 17   Echo complete with contrast if indicated    Narrative [address] [place] [address] [phone]    Transthoracic Echocardiogram  2D, M-mode, Doppler, and Color Doppler    Study date:  03-Aug-2017    Patient: Dorinda Don  MR number: [id number]  Account number: [de-identified]  : 1945  Age: 66 years  Gender: Female  Status: Outpatient  Location: Bedside  Height: 64 in  Weight: 205 lb  BP: 116/ 62 mmHg    Indications: Hypertensive heart disease, Assess left ventricular function      Diagnoses: I11 9 - Hypertensive heart disease without heart failure    Sonographer:  April [address] [facility]  Interpreting Physician:  Sandhya Rueda MD  Referring Physician:  Edmundo Marie PA-C  Group:  [facility]    SUMMARY    PROCEDURE INFORMATION:  This was a technically difficult study  Echocardiographic views were limited due to poor acoustic window availability, decreased penetration, and lung interference  LEFT VENTRICLE:  Systolic function was normal  Ejection fraction was estimated to be 60 %  There were no regional wall motion abnormalities  Doppler parameters were consistent with abnormal left ventricular relaxation (grade 1 diastolic dysfunction)  RIGHT VENTRICLE:  The size was normal   Systolic function was normal     TRICUSPID VALVE:  There was mild regurgitation  Pulmonary artery systolic pressure was within the normal range  HISTORY: Dizziness  PRIOR HISTORY: Risk factors: hypertension and morbid obesity  PROCEDURE: The procedure was performed at the bedside  This was a routine study  The transthoracic approach was used  The study included complete 2D imaging, M-mode, complete spectral Doppler, and color Doppler  The heart rate was 63 bpm,  at the start of the study  Images were obtained from the parasternal, apical, and subcostal acoustic windows  Images were not obtained from the suprasternal notch acoustic windows  Echocardiographic views were limited due to poor acoustic  window availability, decreased penetration, and lung interference  This was a technically difficult study  LEFT VENTRICLE: Size was normal  Systolic function was normal  Ejection fraction was estimated to be 60 %  There were no regional wall motion abnormalities  Wall thickness was normal  No evidence of apical thrombus  DOPPLER: Doppler  parameters were consistent with abnormal left ventricular relaxation (grade 1 diastolic dysfunction)      RIGHT VENTRICLE: The size was normal  Systolic function was normal  Wall thickness was normal     LEFT ATRIUM: Size was normal     RIGHT ATRIUM: Size was normal     MITRAL VALVE: Valve structure was normal  There was normal leaflet separation  DOPPLER: The transmitral velocity was within the normal range  There was no evidence for stenosis  There was no significant regurgitation  AORTIC VALVE: The valve was trileaflet  Leaflets exhibited mildly increased thickness and normal cuspal separation  DOPPLER: Transaortic velocity was within the normal range  There was no evidence for stenosis  There was no significant  regurgitation  TRICUSPID VALVE: The valve structure was normal  There was normal leaflet separation  DOPPLER: The transtricuspid velocity was within the normal range  There was no evidence for stenosis  There was mild regurgitation  Pulmonary artery  systolic pressure was within the normal range  PULMONIC VALVE: Leaflets exhibited normal thickness, no calcification, and normal cuspal separation  DOPPLER: The transpulmonic velocity was within the normal range  There was no significant regurgitation  PERICARDIUM: There was no pericardial effusion  The pericardium was normal in appearance  AORTA: The root exhibited normal size  SYSTEMIC VEINS: IVC: The inferior vena cava was normal in size   Respirophasic changes were normal     SYSTEM MEASUREMENT TABLES    2D  %FS: 33 6 %  Ao Diam: 2 6 cm  EDV(Teich): 92 8 ml  EF(Teich): 62 5 %  ESV(Teich): 34 8 ml  IVSd: 0 9 cm  LA Area: 13 4 cm2  LA Diam: 3 1 cm  LVEDV MOD A4C: 105 3 ml  LVEF MOD A4C: 72 2 %  LVESV MOD A4C: 29 2 ml  LVIDd: 4 5 cm  LVIDs: 3 cm  LVLd A4C: 7 3 cm  LVLs A4C: 6 cm  LVPWd: 0 8 cm  RA Area: 12 6 cm2  RVIDd: 3 9 cm  SV MOD A4C: 76 ml  SV(Teich): 58 ml    CW  TR Vmax: 2 8 m/s  TR maxP 8 mmHg    MM  TAPSE: 2 4 cm    PW  E': 0 1 m/s  E/E': 13 7  MV A Darrell: 1 m/s  MV Dec [place]: 2 4 m/s2  MV DecT: 335 1 ms  MV E Darrell: 0 8 m/s  MV E/A Ratio: 0 8  MV PHT: 97 2 ms  MVA By PHT: 2 3 cm2    [facility] Accredited Echocardiography Laboratory    Prepared and electronically signed by    Mellisa Castro MD  Signed 03-Aug-2017 17:40:08       No results found for this or any previous visit  No results found for this or any previous visit  No procedure found  No results found for this or any previous visit  Meds/Allergies   all current active meds have been reviewed  Prescriptions Prior to Admission   Medication    aspirin 81 mg chewable tablet    carvedilol (COREG) 25 mg tablet    chlorthalidone 25 mg tablet    magnesium oxide (MAG-OX) 400 mg    omeprazole (PriLOSEC) 20 mg delayed release capsule    potassium chloride (K-DUR,KLOR-CON) 20 mEq tablet            Assessment/Plan:  1  HTN -- stable  Last bp 133/71  Continue all meds -- chlorthalidone 25mg qd, labetalol 400mg bid  Echo done ef 60%  Monitor vitals       2   Dizziness -- non-cardiac  Get Neurology to evaluate  Counseling / Coordination of Care  Total floor / unit time spent today 30 minutes  Greater than 50% of total time was spent with the patient and / or family counseling and / or coordination of care  ** Please Note: Dragon 360 Dictation voice to text software may have been used in the creation of this document   **

## 2017-08-22 NOTE — PROGRESS NOTES
Patient complaining of ear pressure and states that her eyes will fill up with mucus randomly  I'm not sure if what is going on with her ears has a direct relationship with her coordination being off  Will mention to SLIM when available       Preston Renae RN  08/22/17  7:52 PM

## 2017-08-22 NOTE — PROGRESS NOTES
[facility] Progress Note  Patient: Gildardo Douglas 66 y o  female   MRN: [id number]  PCP: No primary care provider on file  Unit/Bed#: -01 Encounter: [id number]  Date Of Visit: 17    Assessment:    Principal Problem:    HTN (hypertension)  Active Problems:    Dizziness    Hypokalemia    Hypomagnesemia      Plan:    · Hypertension: This has improved  · Dizziness:  Make sure we get physical therapy evaluation and recheck orthostatics  Hypokalemia:  Replete  · Hypomagnesemia: This has been repeated  ·       VTE Pharmacologic Prophylaxis:   Pharmacologic: Low risk  Mechanical VTE Prophylaxis in Place: Yes    Patient Centered Rounds: I have performed bedside rounds with nursing staff today  Discussions with Specialists or Other Care Team Provider:  Will discuss with Nephrology    Education and Discussions with Family / Patient:  Patient    Time Spent for Care: 20 minutes  More than 50% of total time spent on counseling and coordination of care as described above      Current Length of Stay: 3 day(s)    Current Patient Status: Inpatient   Certification Statement: The patient will continue to require additional inpatient hospital stay due to Being hypokalemic and dizzy    Discharge Plan:  Anticipate discharge tomorrow    Code Status: Level 1 - Full Code      Subjective:   General Appearance:    Alert, cooperative, no distress, appears stated age                                 Lungs:     Clear to auscultation bilaterally, respirations unlabored       Heart:    Regular rate and rhythm, S1 and S2 normal, no murmur, rub    or gallop   Abdomen:     Soft, non-tender, bowel sounds active all four quadrants,     no masses, no organomegaly           Extremities:   Extremities normal, atraumatic, no cyanosis or edema                         Objective:     Vitals:   Temp (24hrs), Av 3 °F (36 8 °C), Min:98 2 °F (36 8 °C), Max:98 4 °F (36 9 °C)    HR:  [67-76] 67  Resp:  [18] 18  BP: (112-134)/(62-73) 128/73  SpO2:  [93 %-96 %] 96 %  Body mass index is 33 68 kg/m²  Input and Output Summary (last 24 hours): Intake/Output Summary (Last 24 hours) at 08/22/17 1052  Last data filed at 08/22/17 0900   Gross per 24 hour   Intake              360 ml   Output             1050 ml   Net             -690 ml       Physical Exam:     General Appearance:    Alert, cooperative, no distress, appears stated age                               Lungs:     Clear to auscultation bilaterally, respirations unlabored       Heart:    Regular rate and rhythm, S1 and S2 normal, no murmur, rub    or gallop   Abdomen:     Soft, non-tender, bowel sounds active all four quadrants,     no masses, no organomegaly           Extremities:   Extremities normal, atraumatic, no cyanosis or edema                       Additional Data:     Labs:          Results from last 7 days  Lab Units 08/22/17  0537   SODIUM mmol/L 139   POTASSIUM mmol/L 2 8*   CHLORIDE mmol/L 103   CO2 mmol/L 30   BUN mg/dL 13   CREATININE mg/dL 0 94   CALCIUM mg/dL 9 1   GLUCOSE RANDOM mg/dL 97           * I Have Reviewed All Lab Data Listed Above  * Additional Pertinent Lab Tests Reviewed: [facility]        Recent Cultures (last 7 days):           Last 24 Hours Medication List:     aspirin 81 mg Oral Daily   chlorthalidone 25 mg Oral Daily   labetalol 400 mg Oral Q12H [address]   potassium chloride 40 mEq Oral BID   potassium chloride 20 mEq Intravenous Q2H   scopolamine 1 patch Transdermal Q72H        Today, Patient Was Seen By: Shana Almaguer MD    ** Please Note: Dragon 360 Dictation voice to text software may have been used in the creation of this document   **

## 2017-08-23 LAB
ANION GAP SERPL CALCULATED.3IONS-SCNC: 8 MMOL/L (ref 4–13)
BUN SERPL-MCNC: 11 MG/DL (ref 5–25)
CALCIUM SERPL-MCNC: 9.2 MG/DL (ref 8.3–10.1)
CHLORIDE SERPL-SCNC: 103 MMOL/L (ref 100–108)
CO2 SERPL-SCNC: 29 MMOL/L (ref 21–32)
CREAT SERPL-MCNC: 0.95 MG/DL (ref 0.6–1.3)
GFR SERPL CREATININE-BSD FRML MDRD: 69 ML/MIN/1.73SQ M
GLUCOSE SERPL-MCNC: 100 MG/DL (ref 65–140)
MAGNESIUM SERPL-MCNC: 1.7 MG/DL (ref 1.6–2.6)
POTASSIUM SERPL-SCNC: 3.3 MMOL/L (ref 3.5–5.3)
SODIUM SERPL-SCNC: 140 MMOL/L (ref 136–145)

## 2017-08-23 PROCEDURE — 83735 ASSAY OF MAGNESIUM: CPT | Performed by: INTERNAL MEDICINE

## 2017-08-23 PROCEDURE — 80048 BASIC METABOLIC PNL TOTAL CA: CPT | Performed by: FAMILY MEDICINE

## 2017-08-23 RX ORDER — POTASSIUM CHLORIDE 14.9 MG/ML
20 INJECTION INTRAVENOUS
Status: COMPLETED | OUTPATIENT
Start: 2017-08-23 | End: 2017-08-24

## 2017-08-23 RX ORDER — MECLIZINE HYDROCHLORIDE 25 MG/1
25 TABLET ORAL EVERY 8 HOURS SCHEDULED
Status: DISCONTINUED | OUTPATIENT
Start: 2017-08-23 | End: 2017-08-25 | Stop reason: HOSPADM

## 2017-08-23 RX ORDER — CALCIUM CARBONATE 200(500)MG
500 TABLET,CHEWABLE ORAL 3 TIMES DAILY PRN
Status: DISCONTINUED | OUTPATIENT
Start: 2017-08-23 | End: 2017-08-25 | Stop reason: HOSPADM

## 2017-08-23 RX ADMIN — ASPIRIN 81 MG 81 MG: 81 TABLET ORAL at 08:06

## 2017-08-23 RX ADMIN — POTASSIUM CHLORIDE 40 MEQ: 1500 TABLET, EXTENDED RELEASE ORAL at 17:15

## 2017-08-23 RX ADMIN — LABETALOL HYDROCHLORIDE 400 MG: 100 TABLET, FILM COATED ORAL at 08:06

## 2017-08-23 RX ADMIN — ANTACID TABLETS 500 MG: 500 TABLET, CHEWABLE ORAL at 17:44

## 2017-08-23 RX ADMIN — CHLORTHALIDONE 25 MG: 25 TABLET ORAL at 08:07

## 2017-08-23 RX ADMIN — MECLIZINE HYDROCHLORIDE 25 MG: 25 TABLET ORAL at 14:52

## 2017-08-23 RX ADMIN — POTASSIUM CHLORIDE 20 MEQ: 200 INJECTION, SOLUTION INTRAVENOUS at 12:58

## 2017-08-23 RX ADMIN — POTASSIUM CHLORIDE 20 MEQ: 200 INJECTION, SOLUTION INTRAVENOUS at 10:26

## 2017-08-23 RX ADMIN — POTASSIUM CHLORIDE 40 MEQ: 1500 TABLET, EXTENDED RELEASE ORAL at 08:06

## 2017-08-23 RX ADMIN — ENOXAPARIN SODIUM 40 MG: 40 INJECTION SUBCUTANEOUS at 14:52

## 2017-08-23 RX ADMIN — LABETALOL HYDROCHLORIDE 400 MG: 100 TABLET, FILM COATED ORAL at 20:22

## 2017-08-23 RX ADMIN — MECLIZINE HYDROCHLORIDE 25 MG: 25 TABLET ORAL at 21:56

## 2017-08-23 NOTE — PROGRESS NOTES
[facility] Internal Medicine Progress Note  Patient: Tad Kaye 66 y o  female   MRN: [id number]  PCP: No primary care provider on file  Unit/Bed#: [place]01 Encounter: [id number]  Date Of Visit: 17    Assessment:    Principal Problem:    HTN (hypertension)  Active Problems:    Dizziness    Hypokalemia    Hypomagnesemia      Plan:    · Hypertension: This has improved  Continue with the chlorthalidone and monitor  · Dizziness:  Patient seems to possibly have a vertiginous component to this  I will put her on meclizine at 25 mg every 8 hours schedule for the next 24 hours and see how she does  · Hypokalemia: We will replete  VTE Pharmacologic Prophylaxis:   Pharmacologic: Enoxaparin (Lovenox)  Mechanical VTE Prophylaxis in Place: Yes    Patient Centered Rounds: I have performed bedside rounds with nursing staff today  Discussions with Specialists or Other Care Team Provider:  Nephrology    Education and Discussions with Family / Patient:  Patient    Time Spent for Care: 20 minutes  More than 50% of total time spent on counseling and coordination of care as described above  Current Length of Stay: 4 day(s)    Current Patient Status: Inpatient   Certification Statement: The patient will continue to require additional inpatient hospital stay due to Having intractable dizziness    Discharge Plan:  Hopeful discharge in 24 hours    Code Status: Level 1 - Full Code      Subjective:   Patient seen examined  She feels dizzy at times where she does have a spinning sensation  Objective:     Vitals:   Temp (24hrs), Av 5 °F (36 9 °C), Min:98 3 °F (36 8 °C), Max:98 7 °F (37 1 °C)    HR:  [63-77] 67  Resp:  [18] 18  BP: (130-139)/(65-73) 138/73  SpO2:  [96 %-97 %] 96 %  Body mass index is 33 6 kg/m²  Input and Output Summary (last 24 hours):        Intake/Output Summary (Last 24 hours) at 17 1335  Last data filed at 17 0727   Gross per 24 hour   Intake              960 ml   Output 1725 ml   Net             -765 ml       Physical Exam:     General Appearance:    Alert, cooperative, no distress, appears stated age                               Lungs:     Clear to auscultation bilaterally, respirations unlabored       Heart:    Regular rate and rhythm, S1 and S2 normal, no murmur, rub    or gallop   Abdomen:     Soft, non-tender, bowel sounds active all four quadrants,     no masses, no organomegaly           Extremities:   Extremities normal, atraumatic, no cyanosis or edema                       Additional Data:     Labs:          Results from last 7 days  Lab Units 08/23/17  0530   SODIUM mmol/L 140   POTASSIUM mmol/L 3 3*   CHLORIDE mmol/L 103   CO2 mmol/L 29   BUN mg/dL 11   CREATININE mg/dL 0 95   CALCIUM mg/dL 9 2   GLUCOSE RANDOM mg/dL 100           * I Have Reviewed All Lab Data Listed Above  * Additional Pertinent Lab Tests Reviewed: All [facility] Admission Reviewed        Recent Cultures (last 7 days):           Last 24 Hours Medication List:     aspirin 81 mg Oral Daily   chlorthalidone 25 mg Oral Daily   labetalol 400 mg Oral Q12H ALE[facility]ANDRA   meclizine 25 mg Oral Q8H [address]   potassium chloride 40 mEq Oral BID   potassium chloride 20 mEq Intravenous Q2H        Today, Patient Was Seen By: Kvng Velazquez MD    ** Please Note: Dragon 360 Dictation voice to text software may have been used in the creation of this document   **

## 2017-08-23 NOTE — PROGRESS NOTES
NEPHROLOGY PROGRESS NOTE    Patient: Sesar Ayala               Sex: female          DOA: 8/18/2017  7:06 AM   YOB: 1945        Age:  66 y o         LOS:  LOS: 4 days     REASON FOR THE CONSULTATION:  Further management of hypertension    HPI      This is a 77-year-old female initially admitted for dizziness will also found to have hypertensive urgency on admission  SUBJECTIVE     - remained nonoliguric and afebrile in last 24 hours  Patient continued to complain of dizziness upon head movement  Patient denies nausea / vomiting / headache / SOB / chest pain today    - Reviewed last 24 hrs events     CURRENT MEDICATIONS       Current Facility-Administered Medications:     acetaminophen (TYLENOL) tablet 650 mg, 650 mg, Oral, Q6H PRN, Janet Ibarra MD    aspirin chewable tablet 81 mg, 81 mg, Oral, Daily, Janet Ibarra MD, 81 mg at 08/23/17 0806    chlorthalidone tablet 25 mg, 25 mg, Oral, Daily, Yong Godinez MD, 25 mg at 08/23/17 0807    hydrALAZINE (APRESOLINE) injection 10 mg, 10 mg, Intravenous, Q4H PRN, Drea Bernal PA-C, 10 mg at 08/18/17 2342    labetalol (NORMODYNE) tablet 400 mg, 400 mg, Oral, Q12H [facility], Perry Ku MD, 400 mg at 08/23/17 0806    ondansetron (ZOFRAN) injection 4 mg, 4 mg, Intravenous, Q4H PRN, PATRICK Baker, 4 mg at 08/19/17 2024    potassium chloride (K-DUR,KLOR-CON) CR tablet 40 mEq, 40 mEq, Oral, BID, Perry Ku MD, 40 mEq at 08/23/17 0806    potassium chloride 20 mEq IVPB (premix), 20 mEq, Intravenous, Q2H, Perry Ku MD, Last Rate: 50 mL/hr at 08/23/17 1026, 20 mEq at 08/23/17 1026    scopolamine (TRANSDERM-SCOP) 1 5 mg/3 days TD 72 hr patch 1 patch, 1 patch, Transdermal, Q72H, Janet Ibarra MD, 1 patch at 08/22/17 1317    OBJECTIVE     Current Weight: Weight - Scale: 88 8 kg (195 lb 12 3 oz)  Vitals:    08/23/17 0700   BP: 138/73   Pulse: 67   Resp: 18   Temp: 98 7 °F (37 1 °C)   SpO2: 96% Intake/Output Summary (Last 24 hours) at 08/23/17 1249  Last data filed at 08/23/17 4998   Gross per 24 hour   Intake             1200 ml   Output             1725 ml   Net             -525 ml       PHYSICAL EXAMINATION     GEN:  Awake and not in acute distress  RS:  Bilateral equal air entry, no wheezing  CVS:  S1-S2 heard  Abdomen:  Soft, nontender, positive bowel sounds  Extremities:  No pedal edema, skin is warm on touch  CNS:  Awake and follows commands  HEENT:  No JVD, head is atraumatic  Psychiatric:  Normal mood and affect, not suicidal  Musculoskeletal:  No gross bony deformity seen  Lymph Node:  No palpable cervical lymphadenopathy    LAB RESULTS       Results from last 7 days  Lab Units 08/23/17  0530 08/22/17  0537 08/21/17  1429 08/21/17  0430 08/20/17  0448 08/19/17 2007 08/19/17  0507 08/18/17  0728   SODIUM mmol/L 140 139  --  141 142  --  139 143   POTASSIUM mmol/L 3 3* 2 8* 3 1* 2 9* 3 1* 3 0* 2 6* 3 5   CHLORIDE mmol/L 103 103  --  103 103  --  102 103   CO2 mmol/L 29 30  --  30 30  --  30 30   BUN mg/dL 11 13  --  19 11  --  10 11   CREATININE mg/dL 0 95 0 94  --  1 04 1 09  --  0 91 1 02   EGFR ml/min/1 73sq m 69 70  --  62 58  --  72 63   CALCIUM mg/dL 9 2 9 1  --  9 2 9 5  --  9 5 9 8   MAGNESIUM mg/dL 1 7 1 8  --  1 6 1 7  --   --  1 8   GLUCOSE RANDOM mg/dL 100 97  --  95 96  --  116 113       RADIOLOGY RESULTS      Results for orders placed during the hospital encounter of 08/18/17   XR chest pa & lateral    Narrative CHEST     INDICATION:  Hypertension/dizziness    COMPARISON:  August 13, 2017    VIEWS:  Frontal and lateral projections    IMAGES:  2    FINDINGS:         Cardiomediastinal silhouette appears unremarkable  The lungs are clear  No pneumothorax or pleural effusion  Visualized osseous structures appear within normal limits for the patient's age  Impression No acute consolidation or congestion seen        Workstation performed: [id number]           PLAN / RECOMMENDATIONS      1  Hypertension  Essential, renal artery Doppler showed no signs of renal artery stenosis either    Currently patient is on chlorthalidone 25 mg PO daily and labetalol 400 mg PO BID  Patient's blood pressure currently well controlled in last 24 hours patient's blood pressure has also remained under control and plan to continue current antihypertensive regimen and monitor hypertension while in the hospital     2   Hypokalemia  Iatrogenic and suspect due to use of chlorthalidone  Currently patient is on potassium chloride 40 mEq PO BID  Potassium level today was 3 3 which is below the goal and plan to give potassium chloride 40 mEq IV x1 dose now  Disposition:  Stable from renal standpoint for discharge  Will consider to continue current antihypertensive regimen along with potassium chloride 40 mEq PO BID upon discharge  Please schedule to have BMP checked within 3-4 days by PCP  Plan was also d/w Sivakumar Woo MD  Nephrology  8/23/2017        Portions of the record may have been created with voice recognition software  Occasional wrong word or "sound a like" substitutions may have occurred due to the inherent limitations of voice recognition software  Read the chart carefully and recognize, using context, where substitutions have occurred

## 2017-08-24 ENCOUNTER — APPOINTMENT (INPATIENT)
Dept: MRI IMAGING | Facility: HOSPITAL | Age: 72
DRG: 304 | End: 2017-08-24
Payer: COMMERCIAL

## 2017-08-24 LAB
ANION GAP SERPL CALCULATED.3IONS-SCNC: 9 MMOL/L (ref 4–13)
BUN SERPL-MCNC: 11 MG/DL (ref 5–25)
CALCIUM SERPL-MCNC: 9.5 MG/DL (ref 8.3–10.1)
CHLORIDE SERPL-SCNC: 104 MMOL/L (ref 100–108)
CO2 SERPL-SCNC: 29 MMOL/L (ref 21–32)
CREAT SERPL-MCNC: 0.95 MG/DL (ref 0.6–1.3)
GFR SERPL CREATININE-BSD FRML MDRD: 69 ML/MIN/1.73SQ M
GLUCOSE SERPL-MCNC: 96 MG/DL (ref 65–140)
PLATELET # BLD AUTO: 105 THOUSANDS/UL (ref 149–390)
PMV BLD AUTO: 13.2 FL (ref 8.9–12.7)
POTASSIUM SERPL-SCNC: 3.4 MMOL/L (ref 3.5–5.3)
SODIUM SERPL-SCNC: 142 MMOL/L (ref 136–145)

## 2017-08-24 PROCEDURE — 85049 AUTOMATED PLATELET COUNT: CPT | Performed by: FAMILY MEDICINE

## 2017-08-24 PROCEDURE — 70551 MRI BRAIN STEM W/O DYE: CPT

## 2017-08-24 PROCEDURE — 80048 BASIC METABOLIC PNL TOTAL CA: CPT | Performed by: FAMILY MEDICINE

## 2017-08-24 RX ORDER — DIAZEPAM 5 MG/1
5 TABLET ORAL EVERY 6 HOURS PRN
Status: DISCONTINUED | OUTPATIENT
Start: 2017-08-24 | End: 2017-08-25 | Stop reason: HOSPADM

## 2017-08-24 RX ORDER — FLUTICASONE PROPIONATE 50 MCG
1 SPRAY, SUSPENSION (ML) NASAL DAILY
Status: DISCONTINUED | OUTPATIENT
Start: 2017-08-24 | End: 2017-08-25 | Stop reason: HOSPADM

## 2017-08-24 RX ADMIN — POTASSIUM CHLORIDE 40 MEQ: 1500 TABLET, EXTENDED RELEASE ORAL at 09:32

## 2017-08-24 RX ADMIN — MECLIZINE HYDROCHLORIDE 25 MG: 25 TABLET ORAL at 21:06

## 2017-08-24 RX ADMIN — FLUTICASONE PROPIONATE 1 SPRAY: 50 SPRAY, METERED NASAL at 15:43

## 2017-08-24 RX ADMIN — CHLORTHALIDONE 25 MG: 25 TABLET ORAL at 09:33

## 2017-08-24 RX ADMIN — DIAZEPAM 5 MG: 5 TABLET ORAL at 13:09

## 2017-08-24 RX ADMIN — ENOXAPARIN SODIUM 40 MG: 40 INJECTION SUBCUTANEOUS at 09:32

## 2017-08-24 RX ADMIN — LABETALOL HYDROCHLORIDE 400 MG: 100 TABLET, FILM COATED ORAL at 09:32

## 2017-08-24 RX ADMIN — MECLIZINE HYDROCHLORIDE 25 MG: 25 TABLET ORAL at 05:24

## 2017-08-24 RX ADMIN — MECLIZINE HYDROCHLORIDE 25 MG: 25 TABLET ORAL at 13:09

## 2017-08-24 RX ADMIN — ANTACID TABLETS 500 MG: 500 TABLET, CHEWABLE ORAL at 21:06

## 2017-08-24 RX ADMIN — POTASSIUM CHLORIDE 40 MEQ: 1500 TABLET, EXTENDED RELEASE ORAL at 18:51

## 2017-08-24 RX ADMIN — ASPIRIN 81 MG 81 MG: 81 TABLET ORAL at 09:32

## 2017-08-24 RX ADMIN — LABETALOL HYDROCHLORIDE 400 MG: 100 TABLET, FILM COATED ORAL at 21:06

## 2017-08-24 NOTE — PROGRESS NOTES
[facility] Progress Note  Patient: Irvin Muller 66 y o  female   MRN: [id number]  PCP: No primary care provider on file  Unit/Bed#: -01 Encounter: [id number]  Date Of Visit: 17    Assessment:    Principal Problem:    HTN (hypertension)  Active Problems:    Dizziness    Hypokalemia    Hypomagnesemia      Plan:    · Hypertension: This has improved  · Hypokalemia: We will replete and monitor  · Dizziness  The patient still continues to have dizziness  Will get MRI this point is not improving  I will put the Valium for possible vertigo as well  If it does not improve we may need to consider Neurology evaluation  · Hypomagnesemia: This has been repleted      VTE Pharmacologic Prophylaxis:   Pharmacologic: Enoxaparin (Lovenox)  Mechanical VTE Prophylaxis in Place: Yes    Patient Centered Rounds: I have performed bedside rounds with nursing staff today  Education and Discussions with Family / Patient:  Patient    Time Spent for Care: 20 minutes  More than 50% of total time spent on counseling and coordination of care as described above  Current Length of Stay: 5 day(s)    Current Patient Status: Inpatient   Certification Statement: The patient will continue to require additional inpatient hospital stay due to Still having intractable dizziness    Discharge Plan:  Anticipate discharge tomorrow    Code Status: Level 1 - Full Code      Subjective:   Patient seen examined  States she is having trouble walking secondary to the dizziness  Patient states it is very bad  He does feel the room spinning time    Objective:     Vitals:   Temp (24hrs), Av 4 °F (36 9 °C), Min:98 °F (36 7 °C), Max:98 8 °F (37 1 °C)    HR:  [65-77] 77  Resp:  [18] 18  BP: (130-183)/(65-82) 130/72  SpO2:  [90 %-100 %] 95 %  Body mass index is 33 57 kg/m²  Input and Output Summary (last 24 hours):        Intake/Output Summary (Last 24 hours) at 17 1257  Last data filed at 17 8213 Gross per 24 hour   Intake              240 ml   Output              450 ml   Net             -210 ml       Physical Exam:     General Appearance:    Alert, cooperative, no distress, appears stated age                               Lungs:     Clear to auscultation bilaterally, respirations unlabored       Heart:    Regular rate and rhythm, S1 and S2 normal, no murmur, rub    or gallop   Abdomen:     Soft, non-tender, bowel sounds active all four quadrants,     no masses, no organomegaly           Extremities:   Extremities normal, atraumatic, no cyanosis or edema                       Additional Data:     Labs:      Results from last 7 days  Lab Units 08/24/17  0445   PLATELETS Thousands/uL 105*       Results from last 7 days  Lab Units 08/24/17  0445   SODIUM mmol/L 142   POTASSIUM mmol/L 3 4*   CHLORIDE mmol/L 104   CO2 mmol/L 29   BUN mg/dL 11   CREATININE mg/dL 0 95   CALCIUM mg/dL 9 5   GLUCOSE RANDOM mg/dL 96           * I Have Reviewed All Lab Data Listed Above  * Additional Pertinent Lab Tests Reviewed: [facility] Admission Reviewed        Recent Cultures (last 7 days):           Last 24 Hours Medication List:     aspirin 81 mg Oral Daily   chlorthalidone 25 mg Oral Daily   enoxaparin 40 mg Subcutaneous Q24H ALEJANDRA   fluticasone 1 spray Each Nare Daily   labetalol 400 mg Oral Q12H ALEJANDRA   meclizine 25 mg Oral Q8H [address]   potassium chloride 40 mEq Oral BID        Today, Patient Was Seen By: Kvng Velazquez MD    ** Please Note: Dragon 360 Dictation voice to text software may have been used in the creation of this document   **

## 2017-08-24 NOTE — PROGRESS NOTES
NEPHROLOGY PROGRESS NOTE    Patient: Loretta Abarca               Sex: female          DOA: 8/18/2017  7:06 AM   YOB: 1945        Age:  66 y o         LOS:  LOS: 5 days     REASON FOR THE CONSULTATION:  Further management of hypertension    HPI     This is a 49-year-old female initially admitted for dizziness and also found to have hypertensive urgency on admission  SUBJECTIVE     - continue to complain of dizziness upon head movement  Found to be afebrile and nonoliguric overnight  Patient denies nausea / vomiting / headache / SOB / chest pain today    - Reviewed last 24 hrs events     CURRENT MEDICATIONS       Current Facility-Administered Medications:     acetaminophen (TYLENOL) tablet 650 mg, 650 mg, Oral, Q6H PRN, Afia Rudolph MD    aspirin chewable tablet 81 mg, 81 mg, Oral, Daily, Afia Rudolph MD, 81 mg at 08/24/17 0932    calcium carbonate (TUMS) chewable tablet 500 mg, 500 mg, Oral, TID PRN, Concepcion Gravely, CRNP, 500 mg at 08/23/17 1744    chlorthalidone tablet 25 mg, 25 mg, Oral, Daily, Susan Victoria MD, 25 mg at 08/24/17 0933    enoxaparin (LOVENOX) subcutaneous injection 40 mg, 40 mg, Subcutaneous, Q24H [address], Virginia Alfaro MD, 40 mg at 08/24/17 0932    hydrALAZINE (APRESOLINE) injection 10 mg, 10 mg, Intravenous, Q4H PRN, Rui Magaña PA-C, 10 mg at 08/18/17 2342    labetalol (NORMODYNE) tablet 400 mg, 400 mg, Oral, Q12H [address], Champ Quinones MD, 400 mg at 08/24/17 0932    meclizine (ANTIVERT) tablet 25 mg, 25 mg, Oral, Q8H [address], Virginia Alfaro MD, 25 mg at 08/24/17 0524    ondansetron (ZOFRAN) injection 4 mg, 4 mg, Intravenous, Q4H PRN, Concepcion Gravely, CRNP, 4 mg at 08/19/17 2024    potassium chloride (K-DUR,KLOR-CON) CR tablet 40 mEq, 40 mEq, Oral, BID, Liddie Joni, MD, 40 mEq at 08/24/17 0932    OBJECTIVE     Current Weight: Weight - Scale: 88 7 kg (195 lb 8 8 oz)  Vitals:    08/24/17 0700   BP: 130/72   Pulse: 77   Resp: 18   Temp: 98 8 °F (37 1 °C)   SpO2: 95%       Intake/Output Summary (Last 24 hours) at 08/24/17 1105  Last data filed at 08/24/17 0649   Gross per 24 hour   Intake              240 ml   Output              450 ml   Net             -210 ml       PHYSICAL EXAMINATION     GEN:  Awake and not in acute distress  RS:  Bilateral equal air entry, no wheezing  CVS:  S1-S2 heard  Abdomen:  Soft, nontender, positive bowel sounds  Extremities:  No edema, skin is warm on touch  CNS:  Awake and follows commands  HEENT:  No JVD, head is atraumatic  Psychiatric:  Not suicidal  Lymph Node:  No palpable cervical lymphadenopathy    LAB RESULTS       Results from last 7 days  Lab Units 08/24/17  0445 08/23/17  0530 08/22/17  0537 08/21/17  1429 08/21/17  0430 08/20/17  0448 08/19/17 2007 08/19/17  0507 08/18/17  0728   PLATELETS Thousands/uL 105*  --   --   --   --   --   --   --   --    SODIUM mmol/L 142 140 139  --  141 142  --  139 143   POTASSIUM mmol/L 3 4* 3 3* 2 8* 3 1* 2 9* 3 1* 3 0* 2 6* 3 5   CHLORIDE mmol/L 104 103 103  --  103 103  --  102 103   CO2 mmol/L 29 29 30  --  30 30  --  30 30   BUN mg/dL 11 11 13  --  19 11  --  10 11   CREATININE mg/dL 0 95 0 95 0 94  --  1 04 1 09  --  0 91 1 02   EGFR ml/min/1 73sq m 69 69 70  --  62 58  --  72 63   CALCIUM mg/dL 9 5 9 2 9 1  --  9 2 9 5  --  9 5 9 8   MAGNESIUM mg/dL  --  1 7 1 8  --  1 6 1 7  --   --  1 8   GLUCOSE RANDOM mg/dL 96 100 97  --  95 96  --  116 113       RADIOLOGY RESULTS      Results for orders placed during the hospital encounter of 08/18/17   XR chest pa & lateral    Narrative CHEST     INDICATION:  Hypertension/dizziness    COMPARISON:  August 13, 2017    VIEWS:  Frontal and lateral projections    IMAGES:  2    FINDINGS:         Cardiomediastinal silhouette appears unremarkable  The lungs are clear  No pneumothorax or pleural effusion  Visualized osseous structures appear within normal limits for the patient's age        Impression No acute consolidation or congestion seen       Workstation performed: [id number]         PLAN / RECOMMENDATIONS      1  Hypertension  Essential, renal artery Doppler earlier did not show any signs of renal artery stenosis  Currently patient's blood pressure is well controlled with chlorthalidone 25 mg PO daily along with labetalol 400 mg PO BID and plan to monitor hypertension today on current regimen  2   Hypokalemia  Iatrogenic and was suspected due to chlorthalidone  Currently patient is on potassium chloride 40 mEq PO BID and today's potassium level was 3 4  Plan to monitor potassium level on current potassium supplement  Disposition:  Stable for discharge from renal standpoint  Would continue current antihypertensive regimen along with potassium chloride 40 mEq PO BID upon discharge and please have BMP recheck with PCP within 3-4 days after discharge  Plan was also d/w Edie Claudio MD  Nephrology  8/24/2017        Portions of the record may have been created with voice recognition software  Occasional wrong word or "sound a like" substitutions may have occurred due to the inherent limitations of voice recognition software  Read the chart carefully and recognize, using context, where substitutions have occurred

## 2017-08-24 NOTE — PROGRESS NOTES
SLIM paged  Scopolamine patch removed due to MRI   NP Herline stated she will review the chart and order if appropriate

## 2017-08-25 VITALS
WEIGHT: 194.89 LBS | DIASTOLIC BLOOD PRESSURE: 75 MMHG | BODY MASS INDEX: 33.27 KG/M2 | HEART RATE: 74 BPM | HEIGHT: 64 IN | SYSTOLIC BLOOD PRESSURE: 130 MMHG | TEMPERATURE: 98.2 F | RESPIRATION RATE: 18 BRPM | OXYGEN SATURATION: 96 %

## 2017-08-25 PROBLEM — E87.6 HYPOKALEMIA: Status: RESOLVED | Noted: 2017-08-02 | Resolved: 2017-08-25

## 2017-08-25 PROBLEM — I10 HTN (HYPERTENSION): Status: RESOLVED | Noted: 2017-08-19 | Resolved: 2017-08-25

## 2017-08-25 PROBLEM — E83.42 HYPOMAGNESEMIA: Status: RESOLVED | Noted: 2017-08-20 | Resolved: 2017-08-25

## 2017-08-25 PROBLEM — R42 DIZZINESS: Status: RESOLVED | Noted: 2017-08-02 | Resolved: 2017-08-25

## 2017-08-25 LAB
ANION GAP SERPL CALCULATED.3IONS-SCNC: 7 MMOL/L (ref 4–13)
BUN SERPL-MCNC: 11 MG/DL (ref 5–25)
CALCIUM SERPL-MCNC: 9.6 MG/DL (ref 8.3–10.1)
CHLORIDE SERPL-SCNC: 104 MMOL/L (ref 100–108)
CO2 SERPL-SCNC: 30 MMOL/L (ref 21–32)
CREAT SERPL-MCNC: 0.89 MG/DL (ref 0.6–1.3)
GFR SERPL CREATININE-BSD FRML MDRD: 74 ML/MIN/1.73SQ M
GLUCOSE SERPL-MCNC: 100 MG/DL (ref 65–140)
POTASSIUM SERPL-SCNC: 3.4 MMOL/L (ref 3.5–5.3)
SODIUM SERPL-SCNC: 141 MMOL/L (ref 136–145)

## 2017-08-25 PROCEDURE — 80048 BASIC METABOLIC PNL TOTAL CA: CPT | Performed by: FAMILY MEDICINE

## 2017-08-25 RX ORDER — MECLIZINE HYDROCHLORIDE 25 MG/1
25 TABLET ORAL EVERY 8 HOURS PRN
Qty: 20 TABLET | Refills: 0 | Status: ON HOLD | OUTPATIENT
Start: 2017-08-25 | End: 2022-05-20

## 2017-08-25 RX ORDER — POTASSIUM CHLORIDE 20 MEQ/1
40 TABLET, EXTENDED RELEASE ORAL 2 TIMES DAILY
Qty: 80 TABLET | Refills: 0 | Status: SHIPPED | OUTPATIENT
Start: 2017-08-25

## 2017-08-25 RX ORDER — FLUTICASONE PROPIONATE 50 MCG
1 SPRAY, SUSPENSION (ML) NASAL DAILY
Qty: 16 G | Refills: 0 | Status: SHIPPED | OUTPATIENT
Start: 2017-08-25

## 2017-08-25 RX ORDER — LABETALOL 200 MG/1
400 TABLET, FILM COATED ORAL EVERY 12 HOURS SCHEDULED
Qty: 60 TABLET | Refills: 0 | Status: SHIPPED | OUTPATIENT
Start: 2017-08-25

## 2017-08-25 RX ADMIN — CHLORTHALIDONE 25 MG: 25 TABLET ORAL at 09:46

## 2017-08-25 RX ADMIN — MECLIZINE HYDROCHLORIDE 25 MG: 25 TABLET ORAL at 05:09

## 2017-08-25 RX ADMIN — LABETALOL HYDROCHLORIDE 400 MG: 100 TABLET, FILM COATED ORAL at 09:45

## 2017-08-25 RX ADMIN — FLUTICASONE PROPIONATE 1 SPRAY: 50 SPRAY, METERED NASAL at 09:46

## 2017-08-25 RX ADMIN — POTASSIUM CHLORIDE 40 MEQ: 1500 TABLET, EXTENDED RELEASE ORAL at 09:44

## 2017-08-25 RX ADMIN — ASPIRIN 81 MG 81 MG: 81 TABLET ORAL at 09:45

## 2017-08-25 RX ADMIN — ENOXAPARIN SODIUM 40 MG: 40 INJECTION SUBCUTANEOUS at 09:45

## 2017-08-25 RX ADMIN — MECLIZINE HYDROCHLORIDE 25 MG: 25 TABLET ORAL at 14:06

## 2017-08-25 NOTE — PROGRESS NOTES
NEPHROLOGY PROGRESS NOTE    Patient: Dilip Royal               Sex: female          DOA: 8/18/2017  7:06 AM   YOB: 1945        Age:  66 y o         LOS:  LOS: 6 days       HPI   [facility] admitted with uncontrolled hypertension and dizziness      SUBJECTIVE     She is feeling better dizziness is much better    No other acute complaint    CURRENT MEDICATIONS       Current Facility-Administered Medications:     acetaminophen (TYLENOL) tablet 650 mg, 650 mg, Oral, Q6H PRN, Sree Church MD    aspirin chewable tablet 81 mg, 81 mg, Oral, Daily, Sree Church MD, 81 mg at 08/25/17 0945    calcium carbonate (TUMS) chewable tablet 500 mg, 500 mg, Oral, TID PRN, PATRICK Paulino, 500 mg at 08/24/17 2106    chlorthalidone tablet 25 mg, 25 mg, Oral, Daily, Stephan Sears MD, 25 mg at 08/25/17 0946    diazepam (VALIUM) tablet 5 mg, 5 mg, Oral, Q6H PRN, Shana Almaguer MD, 5 mg at 08/24/17 1309    enoxaparin (LOVENOX) subcutaneous injection 40 mg, 40 mg, Subcutaneous, Q24H [address], Shana Almaguer MD, 40 mg at 08/25/17 0945    fluticasone (FLONASE) 50 mcg/act nasal spray 1 spray, 1 spray, Each Nare, Daily, Shana Almaguer MD, 1 spray at 08/25/17 0946    hydrALAZINE (APRESOLINE) injection 10 mg, 10 mg, Intravenous, Q4H PRN, Taylor Oneil PA-C, 10 mg at 08/18/17 2342    labetalol (NORMODYNE) tablet 400 mg, 400 mg, Oral, Q12H [address], Tina May MD, 400 mg at 08/25/17 0945    meclizine (ANTIVERT) tablet 25 mg, 25 mg, Oral, Q8H [address], Shana Almaguer MD, 25 mg at 08/25/17 0509    ondansetron (ZOFRAN) injection 4 mg, 4 mg, Intravenous, Q4H PRN, PATRICK Paulino, 4 mg at 08/19/17 2024    potassium chloride (K-DUR,KLOR-CON) CR tablet 40 mEq, 40 mEq, Oral, BID, Tina May MD, 40 mEq at 08/25/17 0944    OBJECTIVE     Current Weight: Weight - Scale: 88 4 kg (194 lb 14 2 oz)  Vitals:    08/25/17 0657   BP: 139/67   Pulse: 66   Resp: 18   Temp: 98 3 °F (36 8 °C)   SpO2: 97% Intake/Output Summary (Last 24 hours) at 08/25/17 1042  Last data filed at 08/25/17 0900   Gross per 24 hour   Intake              780 ml   Output              650 ml   Net              130 ml       PHYSICAL EXAMINATION     Physical Exam:   Eyes:  Pupils equally reacting to light  ENT:  Moist tongue  Gen:  Not in any acute distress  Neck:  Supple  Chest:  Clear  Cor:  S1-S2 normal  Abdomen:  Soft nontender  Ext:  No swelling  Neuro:  Awake and alert  Skin:  Good skin turgor      LAB RESULTS       Results from last 7 days  Lab Units 08/25/17  0445 08/24/17  0445 08/23/17  0530 08/22/17  0537 08/21/17  1429 08/21/17  0430 08/20/17  0448  08/19/17  0507   PLATELETS Thousands/uL  --  105*  --   --   --   --   --   --   --    SODIUM mmol/L 141 142 140 139  --  141 142  --  139   POTASSIUM mmol/L 3 4* 3 4* 3 3* 2 8* 3 1* 2 9* 3 1*  < > 2 6*   CHLORIDE mmol/L 104 104 103 103  --  103 103  --  102   CO2 mmol/L 30 29 29 30  --  30 30  --  30   BUN mg/dL 11 11 11 13  --  19 11  --  10   CREATININE mg/dL 0 89 0 95 0 95 0 94  --  1 04 1 09  --  0 91   CALCIUM mg/dL 9 6 9 5 9 2 9 1  --  9 2 9 5  --  9 5   MAGNESIUM mg/dL  --   --  1 7 1 8  --  1 6 1 7  --   --    GLUCOSE RANDOM mg/dL 100 96 100 97  --  95 96  --  116   < > = values in this interval not displayed  RADIOLOGY RESULTS      No results found for this or any previous visit  Results for orders placed during the hospital encounter of 08/18/17   XR chest pa & lateral    Narrative CHEST     INDICATION:  Hypertension/dizziness    COMPARISON:  August 13, 2017    VIEWS:  Frontal and lateral projections    IMAGES:  2    FINDINGS:         Cardiomediastinal silhouette appears unremarkable  The lungs are clear  No pneumothorax or pleural effusion  Visualized osseous structures appear within normal limits for the patient's age  Impression No acute consolidation or congestion seen        Workstation performed: [id number]       No results found for this or any previous visit  No results found for this or any previous visit  No results found for this or any previous visit  No results found for this or any previous visit  PLAN / RECOMMENDATIONS      Uncontrolled hypertension:  Much better with labetalol and diuretic which I will continue    Dizziness:  Better though with abnormal MRI neurological evaluation may be helpful    Disposition:  Stable to be discharged renal point of view    Kiran Rubalcava MD  Nephrology  8/25/2017        Portions of the record may have been created with voice recognition software  Occasional wrong word or "sound a like" substitutions may have occurred due to the inherent limitations of voice recognition software  Read the chart carefully and recognize, using context, where substitutions have occurred

## 2017-08-25 NOTE — PLAN OF CARE
Problem: DISCHARGE PLANNING - CARE MANAGEMENT  Goal: Discharge to post-acute care or home with appropriate resources  INTERVENTIONS:  - Conduct assessment to determine patient/family and health care team treatment goals, and need for post-acute services based on payer coverage, community resources, and patient preferences, and barriers to discharge  - Address psychosocial, clinical, and financial barriers to discharge as identified in assessment in conjunction with the patient/family and health care team  - Arrange appropriate level of post-acute services according to patient's   needs and preference and payer coverage in collaboration with the physician and health care team  - Communicate with and update the patient/family, physician, and health care team regarding progress on the discharge plan  - Arrange appropriate transportation to post-acute venues   INTERVENTIONS:  - Conduct assessment to determine patient/family and health care team treatment goals, and need for post-acute services based on payer coverage, community resources, and patient preferences, and barriers to discharge  - Address psychosocial, clinical, and financial barriers to discharge as identified in assessment in conjunction with the patient/family and health care team  - Arrange appropriate level of post-acute services according to patient's   needs and preference and payer coverage in collaboration with the physician and health care team  - Communicate with and update the patient/family, physician, and health care team regarding progress on the discharge plan  - Arrange appropriate transportation to post-acute venues  Outcome: Completed Date Met: 08/25/17  CM met with pt at bedside  IMM reviewed with pt  Answered all questions  Copy to pt and copy to MR for scanning  Pt to be discharged home with daughter Ry Blades transporting  CM again discussed [facility] services, but pt refuses    States that she lives with her daughter and granddaughter and does not feel HH will be necessary  Will go home w/no needs

## 2017-08-25 NOTE — PLAN OF CARE
Potential for Falls     Patient will remain free of falls Completed        Prexisting or High Potential for Compromised Skin Integrity     Skin integrity is maintained or improved Completed

## 2017-08-25 NOTE — SOCIAL WORK
CM met with pt at bedside  IMM reviewed with pt  Answered all questions  Copy to pt and copy to MR for scanning  Pt to be discharged home with daughter Daya Shanks transporting  CM again discussed [facility] services, but pt refuses  States that she lives with her daughter and granddaughter and does not feel HH will be necessary  Will go home w/no needs

## 2017-08-25 NOTE — DISCHARGE SUMMARY
Discharge Summary - [address] [facility]    Patient Information: Nickie Fuller 66 y o  female MRN: [id number]  Unit/Bed#: -01 Encounter: [id number]    Discharging Physician / Practitioner: Dacia Osorio MD  PCP: No primary care provider on file  Admission Date: 8/18/2017  Discharge Date: 08/25/17    Reason for Admission:  Dizziness    Discharge Diagnoses:     Principal Problem (Resolved):    HTN (hypertension)  Active Problems:    * No active hospital problems  *  Resolved Problems:    Dizziness    Hypokalemia    Hypertensive urgency    Hypomagnesemia      Consultations During Hospital Stay:  · [facility]    Procedures Performed:     · MRI of the brain showing a 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 tonsils below the Rendon magnum    Significant Findings / Test Results:     · None    Incidental Findings:   · Hypokalemia  · See above the MRI report     Test Results Pending at Discharge (will require follow up): · None     Outpatient Tests Requested:  · Patient will need an MRI of the cervical spine    Complications:  None    Hospital Course:     Nickie Fuller is a 66 y o  female patient who originally presented to the hospital on 8/18/2017 due to dizziness  History of presenting Sherrie Capone is a 66 y o  female with past medical history pertinent for hypertension, recently not controlled, discharged from our service earlier this week who presents with significant dizziness, progressively worse over night and this morning  She has been struggling with dizziness since she started Coreg  She has had problems with hypertension since she was 42years old  She admits that she has been inconsistent with her potassium supplement at home    She denies any recent fall, head injury, focal neurological deficits like arm or leg weakness, visual disturbance, speech difficulty, isolated numbness  All reviewed prior admission and discharge  Patient was evaluated in the ED, she was given IV labetalol, her blood pressure has not trended down  Cardiology was called for evaluation, advised admission for observation  Coreg was stopped, patient started on oral labetalol  Currently she is complaining of feeling fatigued and in patient with the medication adjustment process    Hospital course: The patient was admitted for the above reasons  She was seen in consultation by both Cardiology as well as Nephrology  The patient did have some hypertensive urgency and was switched to labetalol and her blood pressure has improved  She was also seen in consultation by Nephrology for persistent hypokalemia  The patient is going to be on potassium at 40 mEq twice a day per Nephrology recommendations  The patient's potassium today is 3 4 has been stable  While she was here the patient still complained of intractable dizziness  It did seem to have a slightly vertiginous component to it and the patient was given some Valium and she had this ear fullness for which I also put the patient on intranasal steroids and she seemed to improve  I will send her home with some meclizine as an outpatient  For completeness sake to rule out a central cause we did do an MRI of the cervical spine which showed a Chiari 1 malformation  I did speak to Neurosurgery  They just recommended an outpatient MRI of the cervical spine with neurosurgical follow-up for which I recommended the patient get through her family doctor  The patient feels well today eager and anxious to go home she will need a BMP done later this week or early next week    Condition at Discharge: good     Discharge Day Visit / Exam:     Subjective:  Patient seen examined    She feels better today  Vitals: Blood Pressure: 139/67 (08/25/17 0657)  Pulse: 66 (08/25/17 0657)  Temperature: 98 3 °F (36 8 °C) (08/25/17 0657)  Temp Source: Oral (08/25/17 0657)  Respirations: 18 (08/25/17 0657)  Height: 5' 4" (162 6 cm) (08/18/17 1537)  Weight - Scale: 88 4 kg (194 lb 14 2 oz) (08/25/17 0510)  SpO2: 97 % (08/25/17 0657)  Exam:   Physical Exam  (   General Appearance:    Alert, cooperative, no distress, appears stated age                               Lungs:     Clear to auscultation bilaterally, respirations unlabored       Heart:    Regular rate and rhythm, S1 and S2 normal, no murmur, rub    or gallop   Abdomen:     Soft, non-tender, bowel sounds active all four quadrants,     no masses, no organomegaly           Extremities:   Extremities normal, atraumatic, no cyanosis or edema     Discussion with Family:  Discussed with the patient    Discharge instructions/Information to patient and family:   See after visit summary for information provided to patient and family  Provisions for Follow-Up Care:  See after visit summary for information related to follow-up care and any pertinent home health orders  Disposition:     Home    For Discharges to [facility] SNF:   · Not Applicable to this Patient - Not Applicable to this Patient    Planned Readmission:  None anticipated     Discharge Statement:  I spent 25 minutes discharging the patient  This time was spent on the day of discharge  I had direct contact with the patient on the day of discharge  Greater than 50% of the total time was spent examining patient, answering all patient questions, arranging and discussing plan of care with patient as well as directly providing post-discharge instructions  Additional time then spent on discharge activities  Discharge Medications:  See after visit summary for reconciled discharge medications provided to patient and family        ** Please Note: This note has been constructed using a voice recognition system **

## 2017-09-14 ENCOUNTER — ALLSCRIPTS OFFICE VISIT (OUTPATIENT)
Dept: OTHER | Facility: OTHER | Age: 72
End: 2017-09-14

## 2017-09-14 DIAGNOSIS — E83.42 HYPOMAGNESEMIA: ICD-10-CM

## 2017-09-14 DIAGNOSIS — E87.6 HYPOKALEMIA: ICD-10-CM

## 2017-09-14 DIAGNOSIS — I10 ESSENTIAL (PRIMARY) HYPERTENSION: ICD-10-CM

## 2017-09-21 ENCOUNTER — TRANSCRIBE ORDERS (OUTPATIENT)
Dept: ADMINISTRATIVE | Facility: HOSPITAL | Age: 72
End: 2017-09-21

## 2017-09-21 ENCOUNTER — APPOINTMENT (OUTPATIENT)
Dept: LAB | Facility: HOSPITAL | Age: 72
End: 2017-09-21
Attending: INTERNAL MEDICINE
Payer: COMMERCIAL

## 2017-09-21 DIAGNOSIS — E83.42 HYPOMAGNESEMIA: ICD-10-CM

## 2017-09-21 DIAGNOSIS — I10 ESSENTIAL (PRIMARY) HYPERTENSION: ICD-10-CM

## 2017-09-21 DIAGNOSIS — E87.6 HYPOKALEMIA: ICD-10-CM

## 2017-09-21 LAB
ANION GAP SERPL CALCULATED.3IONS-SCNC: 7 MMOL/L (ref 4–13)
BACTERIA UR QL AUTO: ABNORMAL /HPF
BILIRUB UR QL STRIP: NEGATIVE
BUN SERPL-MCNC: 13 MG/DL (ref 5–25)
CALCIUM SERPL-MCNC: 10 MG/DL (ref 8.3–10.1)
CHLORIDE SERPL-SCNC: 104 MMOL/L (ref 100–108)
CLARITY UR: CLEAR
CO2 SERPL-SCNC: 29 MMOL/L (ref 21–32)
COLOR UR: YELLOW
CREAT SERPL-MCNC: 1.07 MG/DL (ref 0.6–1.3)
GFR SERPL CREATININE-BSD FRML MDRD: 60 ML/MIN/1.73SQ M
GLUCOSE SERPL-MCNC: 92 MG/DL (ref 65–140)
GLUCOSE UR STRIP-MCNC: NEGATIVE MG/DL
HGB UR QL STRIP.AUTO: ABNORMAL
KETONES UR STRIP-MCNC: NEGATIVE MG/DL
LEUKOCYTE ESTERASE UR QL STRIP: ABNORMAL
MAGNESIUM SERPL-MCNC: 1.6 MG/DL (ref 1.6–2.6)
MUCOUS THREADS UR QL AUTO: ABNORMAL
NITRITE UR QL STRIP: NEGATIVE
NON-SQ EPI CELLS URNS QL MICRO: ABNORMAL /HPF
PH UR STRIP.AUTO: 7.5 [PH] (ref 4.5–8)
PHOSPHATE SERPL-MCNC: 2.9 MG/DL (ref 2.3–4.1)
POTASSIUM SERPL-SCNC: 4 MMOL/L (ref 3.5–5.3)
PROT UR STRIP-MCNC: ABNORMAL MG/DL
RBC #/AREA URNS AUTO: ABNORMAL /HPF
SODIUM SERPL-SCNC: 140 MMOL/L (ref 136–145)
SP GR UR STRIP.AUTO: 1.02 (ref 1–1.03)
UROBILINOGEN UR QL STRIP.AUTO: 0.2 E.U./DL
WBC #/AREA URNS AUTO: ABNORMAL /HPF

## 2017-09-21 PROCEDURE — 83735 ASSAY OF MAGNESIUM: CPT

## 2017-09-21 PROCEDURE — 80048 BASIC METABOLIC PNL TOTAL CA: CPT

## 2017-09-21 PROCEDURE — 36415 COLL VENOUS BLD VENIPUNCTURE: CPT

## 2017-09-21 PROCEDURE — 81001 URINALYSIS AUTO W/SCOPE: CPT

## 2017-09-21 PROCEDURE — 84100 ASSAY OF PHOSPHORUS: CPT

## 2017-10-24 ENCOUNTER — APPOINTMENT (EMERGENCY)
Dept: RADIOLOGY | Facility: HOSPITAL | Age: 72
End: 2017-10-24
Payer: COMMERCIAL

## 2017-10-24 ENCOUNTER — APPOINTMENT (EMERGENCY)
Dept: CT IMAGING | Facility: HOSPITAL | Age: 72
End: 2017-10-24
Payer: COMMERCIAL

## 2017-10-24 ENCOUNTER — HOSPITAL ENCOUNTER (EMERGENCY)
Facility: HOSPITAL | Age: 72
Discharge: HOME | End: 2017-10-25
Attending: EMERGENCY MEDICINE
Payer: COMMERCIAL

## 2017-10-24 DIAGNOSIS — R91.1 LUNG NODULE: ICD-10-CM

## 2017-10-24 DIAGNOSIS — R07.9 CHEST PAIN: Primary | ICD-10-CM

## 2017-10-24 LAB
ALBUMIN SERPL BCP-MCNC: 3.9 G/DL (ref 3.5–5)
ALP SERPL-CCNC: 54 U/L (ref 46–116)
ALT SERPL W P-5'-P-CCNC: 30 U/L (ref 12–78)
ANION GAP SERPL CALCULATED.3IONS-SCNC: 5 MMOL/L (ref 4–13)
AST SERPL W P-5'-P-CCNC: 20 U/L (ref 5–45)
BASOPHILS # BLD AUTO: 0.03 THOUSANDS/ΜL (ref 0–0.1)
BASOPHILS NFR BLD AUTO: 1 % (ref 0–1)
BILIRUB SERPL-MCNC: 1.4 MG/DL (ref 0.2–1)
BUN SERPL-MCNC: 15 MG/DL (ref 5–25)
CALCIUM SERPL-MCNC: 9.6 MG/DL (ref 8.3–10.1)
CHLORIDE SERPL-SCNC: 102 MMOL/L (ref 100–108)
CO2 SERPL-SCNC: 31 MMOL/L (ref 21–32)
CREAT SERPL-MCNC: 1.01 MG/DL (ref 0.6–1.3)
DEPRECATED D DIMER PPP: 1197 NG/ML (FEU) (ref 0–424)
EOSINOPHIL # BLD AUTO: 0.14 THOUSAND/ΜL (ref 0–0.61)
EOSINOPHIL NFR BLD AUTO: 4 % (ref 0–6)
ERYTHROCYTE [DISTWIDTH] IN BLOOD BY AUTOMATED COUNT: 15.3 % (ref 11.6–15.1)
GFR SERPL CREATININE-BSD FRML MDRD: 64 ML/MIN/1.73SQ M
GLUCOSE SERPL-MCNC: 105 MG/DL (ref 65–140)
HCT VFR BLD AUTO: 37.4 % (ref 34.8–46.1)
HGB BLD-MCNC: 12.1 G/DL (ref 11.5–15.4)
LYMPHOCYTES # BLD AUTO: 1.33 THOUSANDS/ΜL (ref 0.6–4.47)
LYMPHOCYTES NFR BLD AUTO: 35 % (ref 14–44)
MCH RBC QN AUTO: 29.4 PG (ref 26.8–34.3)
MCHC RBC AUTO-ENTMCNC: 32.4 G/DL (ref 31.4–37.4)
MCV RBC AUTO: 91 FL (ref 82–98)
MONOCYTES # BLD AUTO: 0.43 THOUSAND/ΜL (ref 0.17–1.22)
MONOCYTES NFR BLD AUTO: 11 % (ref 4–12)
NEUTROPHILS # BLD AUTO: 1.88 THOUSANDS/ΜL (ref 1.85–7.62)
NEUTS SEG NFR BLD AUTO: 49 % (ref 43–75)
NRBC BLD AUTO-RTO: 0 /100 WBCS
PLATELET # BLD AUTO: 134 THOUSANDS/UL (ref 149–390)
PMV BLD AUTO: 12.5 FL (ref 8.9–12.7)
POTASSIUM SERPL-SCNC: 3.6 MMOL/L (ref 3.5–5.3)
PROT SERPL-MCNC: 7.3 G/DL (ref 6.4–8.2)
RBC # BLD AUTO: 4.12 MILLION/UL (ref 3.81–5.12)
SODIUM SERPL-SCNC: 138 MMOL/L (ref 136–145)
TROPONIN I SERPL-MCNC: <0.02 NG/ML
WBC # BLD AUTO: 3.83 THOUSAND/UL (ref 4.31–10.16)

## 2017-10-24 PROCEDURE — 85379 FIBRIN DEGRADATION QUANT: CPT | Performed by: EMERGENCY MEDICINE

## 2017-10-24 PROCEDURE — 71010 HB CHEST X-RAY 1 VIEW FRONTAL (PORTABLE): CPT

## 2017-10-24 PROCEDURE — 96374 THER/PROPH/DIAG INJ IV PUSH: CPT

## 2017-10-24 PROCEDURE — 71275 CT ANGIOGRAPHY CHEST: CPT

## 2017-10-24 PROCEDURE — 84484 ASSAY OF TROPONIN QUANT: CPT | Performed by: EMERGENCY MEDICINE

## 2017-10-24 PROCEDURE — 36415 COLL VENOUS BLD VENIPUNCTURE: CPT | Performed by: EMERGENCY MEDICINE

## 2017-10-24 PROCEDURE — 85025 COMPLETE CBC W/AUTO DIFF WBC: CPT | Performed by: EMERGENCY MEDICINE

## 2017-10-24 PROCEDURE — 80053 COMPREHEN METABOLIC PANEL: CPT | Performed by: EMERGENCY MEDICINE

## 2017-10-24 PROCEDURE — 93005 ELECTROCARDIOGRAM TRACING: CPT | Performed by: EMERGENCY MEDICINE

## 2017-10-24 RX ORDER — LABETALOL HYDROCHLORIDE 5 MG/ML
10 INJECTION, SOLUTION INTRAVENOUS ONCE
Status: COMPLETED | OUTPATIENT
Start: 2017-10-24 | End: 2017-10-24

## 2017-10-24 RX ORDER — ACETAMINOPHEN 325 MG/1
975 TABLET ORAL ONCE
Status: COMPLETED | OUTPATIENT
Start: 2017-10-24 | End: 2017-10-24

## 2017-10-24 RX ADMIN — LABETALOL 20 MG/4 ML (5 MG/ML) INTRAVENOUS SYRINGE 10 MG: at 23:19

## 2017-10-24 RX ADMIN — ACETAMINOPHEN 975 MG: 325 TABLET ORAL at 22:02

## 2017-10-24 RX ADMIN — IOHEXOL 85 ML: 350 INJECTION, SOLUTION INTRAVENOUS at 22:40

## 2017-10-25 VITALS
BODY MASS INDEX: 33.3 KG/M2 | DIASTOLIC BLOOD PRESSURE: 101 MMHG | HEART RATE: 65 BPM | WEIGHT: 194 LBS | SYSTOLIC BLOOD PRESSURE: 213 MMHG | OXYGEN SATURATION: 99 % | RESPIRATION RATE: 18 BRPM | TEMPERATURE: 97.3 F

## 2017-10-25 LAB
ATRIAL RATE: 68 BPM
P AXIS: 62 DEGREES
PR INTERVAL: 156 MS
QRS AXIS: -10 DEGREES
QRSD INTERVAL: 78 MS
QT INTERVAL: 354 MS
QTC INTERVAL: 376 MS
T WAVE AXIS: -11 DEGREES
TROPONIN I SERPL-MCNC: <0.02 NG/ML
VENTRICULAR RATE: 68 BPM

## 2017-10-25 PROCEDURE — 99284 EMERGENCY DEPT VISIT MOD MDM: CPT

## 2017-10-25 PROCEDURE — 36415 COLL VENOUS BLD VENIPUNCTURE: CPT | Performed by: EMERGENCY MEDICINE

## 2017-10-25 PROCEDURE — 93005 ELECTROCARDIOGRAM TRACING: CPT | Performed by: EMERGENCY MEDICINE

## 2017-10-25 PROCEDURE — 84484 ASSAY OF TROPONIN QUANT: CPT | Performed by: EMERGENCY MEDICINE

## 2017-10-25 NOTE — DISCHARGE INSTRUCTIONS
Pulmonary Nodules   WHAT YOU NEED TO KNOW:   Pulmonary nodules are areas of abnormal tissue in your lungs  You may not have any symptoms, or you may have chest tightness, a cough, chest pain, or shortness of breath  Nodules are usually found with an x-ray or CT scan  Most nodules are not cancerous  However, it is still important for you to return for follow-up testing to monitor your condition  DISCHARGE INSTRUCTIONS:   Call 911 for any of the following:   · You have severe shortness of breath or trouble breathing  · Your lips or nails look blue or pale  Return to the emergency department if:   · You cough up blood  · You suddenly feel lightheaded or are short of breath  · You have chest pain when you take a deep breath or cough  · You cannot think clearly  Contact your healthcare provider if:   · Your symptoms do not improve  · You have new symptoms  · You have questions or concerns about your condition or care  Follow up with your healthcare provider:  Your healthcare provider will refer you to a pulmonologist  Your pulmonologist will monitor your nodules for any change or growth  Nodules that grow quickly may require a biopsy to check for cancer  You may need to be monitored for 1 to 3 years  Write down your questions so you remember to ask them during your visits  Do not smoke:  Nicotine and other chemicals in cigarettes and cigars can cause lung damage or cancer  Stay away from others who smoke  Ask your healthcare provider for information if you or someone close to you currently smokes and needs help to quit  E-cigarettes or smokeless tobacco still contain nicotine  Talk to your healthcare provider before you use these products  © 2017 2600 [facility] Information is for End User's use only and may not be sold, redistributed or otherwise used for commercial purposes   All illustrations and images included in CareNotes® are the copyrighted property of [employer]  or [employer]  The above information is an  only  It is not intended as medical advice for individual conditions or treatments  Talk to your doctor, nurse or pharmacist before following any medical regimen to see if it is safe and effective for you  Chest Pain   WHAT YOU NEED TO KNOW:   Chest pain can be caused by a range of conditions, from not serious to life-threatening  Chest pain can be a symptom of a digestive problem, such as acid reflux or a stomach ulcer  An anxiety attack or a strong emotion, such as anger, can also cause chest pain  Infection, inflammation, or a fracture in the bones or cartilage in your chest can cause pain or discomfort  Sometimes chest pain or pressure is caused by poor blood flow to your heart (angina)  Chest pain may also be caused by life-threatening conditions such as a heart attack or blood clot in your lungs  DISCHARGE INSTRUCTIONS:   Call 911 if:   · You have any of the following signs of a heart attack:      ¨ Squeezing, pressure, or pain in your chest that lasts longer than 5 minutes or returns    ¨ Discomfort or pain in your back, neck, jaw, stomach, or arm     ¨ Trouble breathing    ¨ Nausea or vomiting    ¨ Lightheadedness or a sudden cold sweat, especially with chest pain or trouble breathing    Return to the emergency department if:   · You have chest discomfort that gets worse, even with medicine  · You cough or vomit blood  · Your bowel movements are black or bloody  · You cannot stop vomiting, or it hurts to swallow  Contact your healthcare provider if:   · You have questions or concerns about your condition or care  Medicines:   · Medicines  may be given to treat the cause of your chest pain  Examples include pain medicine, anxiety medicine, or medicines to increase blood flow to your heart       · Do not take certain medicines without asking your healthcare provider first   These include NSAIDs, herbal or vitamin supplements, or hormones (estrogen or progestin)  · Take your medicine as directed  Contact your healthcare provider if you think your medicine is not helping or if you have side effects  Tell him or her if you are allergic to any medicine  Keep a list of the medicines, vitamins, and herbs you take  Include the amounts, and when and why you take them  Bring the list or the pill bottles to follow-up visits  Carry your medicine list with you in case of an emergency  Follow up with your healthcare provider within 72 hours, or as directed: You may need to return for more tests to find the cause of your chest pain  You may be referred to a specialist, such as a cardiologist or gastroenterologist  Write down your questions so you remember to ask them during your visits  Healthy living tips: The following are general healthy guidelines  If your chest pain is caused by a heart problem, your healthcare provider will give you specific guidelines to follow  · Do not smoke  Nicotine and other chemicals in cigarettes and cigars can cause lung and heart damage  Ask your healthcare provider for information if you currently smoke and need help to quit  E-cigarettes or smokeless tobacco still contain nicotine  Talk to your healthcare provider before you use these products  · Eat a variety of healthy, low-fat foods  Healthy foods include fruits, vegetables, whole-grain breads, low-fat dairy products, beans, lean meats, and fish  Ask for more information about a heart healthy diet  · Ask about activity  Your healthcare provider will tell you which activities to limit or avoid  Ask when you can drive, return to work, and have sex  Ask about the best exercise plan for you  · Maintain a healthy weight  Ask your healthcare provider how much you should weigh  Ask him or her to help you create a weight loss plan if you are overweight  · Get the flu and pneumonia vaccines    All adults should get the influenza (flu) vaccine  Get it every year as soon as it becomes available  The pneumococcal vaccine is given to adults aged 72 years or older  The vaccine is given every 5 years to prevent pneumococcal disease, such as pneumonia  © 2017 [address] [employer] Information is for End User's use only and may not be sold, redistributed or otherwise used for commercial purposes  All illustrations and images included in CareNotes® are the copyrighted property of A D A M [employer]  or [employer]  The above information is an  only  It is not intended as medical advice for individual conditions or treatments  Talk to your doctor, nurse or pharmacist before following any medical regimen to see if it is safe and effective for you

## 2017-10-25 NOTE — ED NOTES
Patient transported to [facility] [place], [address] [place] [address]  10/24/17 5851

## 2017-10-25 NOTE — ED PROVIDER NOTES
History  Chief Complaint   Patient presents with    Rib Pain     Patient reports L sided rib pain on and off the for the past few days  Patient reports occasionally gets sharp pains with it  Patient did not take blood pressue medicaiton yet today     HPI  66 y o  female presents with a week of intermittent episodes of left sided chest pain along the inferior border of the breast without radiation  Patient describes moderate pressure that was intermittent and has worsened today being constant today since awakening  Patient states nothing worsens the pain and nothing improves the pain  Patient denies exertional component to her chest pain  ROS: Patient associates increasing belching and denies fever, nausea/vomiting, diaphoresis, dyspnea, cough, hemoptysis, weakness, dizziness, back pain, syncope, focal weakness or numbness, leg pain or swelling  All other review of systems reviewed and noted to be negative  The patient affirms a history of atherosclerotic disease (TIA)  Patient affirms any history of hypertension, denies hyperlipidemia, denies diabetes; affirms any early family history of CAD (male less than 51yo or female less than 73 yo)  The patient denies any use of tobacco in the past 90 days  The patient denies any use of illicit drugs, including cocaine  Patient denies any immobilization of at least 3 days or surgery in the past 4 weeks  Patient denies any history of DVT or PE  Patient denies any malignancy with treatment within the past 6 months  Objective  PHYSICAL EXAM:  Constitutional:  No acute distress  Eyes: No scleral icterus or erythema  HENT:  Head normocephalic and atraumatic  Pharynx moist without erythema or exudate  CV:  Normal inspection with no rash, signs of infection, or trauma  Regular rate and rhythm  No murmur  Peripheral pulses intact and equal   Respiratory:  Lungs clear to auscultation bilaterally without adventitious sounds    Abdomen:  Soft, non-tender, non-distended  Back:  No rash or signs of herpes zoster  Skin:  Normal color  Warm and Dry  Extremities:  Non-tender lower extremities without asymmetry; no clinical signs of DVT  No lower extremity edema  Neuro:  Alert  No gross motor deficits  Psych: Normal mood and affect  Medical Decision Making    HEART SCORE  History: 0  EK (ST changes +2, Other abnormalities +1)  Age: 2 (greater 65 +2, 45-65 +1)  Risk factors: 2 (3 or more +2, 1-2 factors +1)  Troponin: 0 (greater than 3 times limit +2)  Total: 5    Patient presenting with chest pain  The patient's HEART score is 4  EKG obtained and reviewed independently by myself, which was NSR without any acute ST/T wave changes  CXR will be obtained to evaluate for alternative pathologies, including pneumothorax or pneumonia  CBC will be obtained to evaluate for leukocytosis suggestive of infectious process such as pneumonia or myocarditis  Ml Mckinney' Criteria for Pulmonary Embolism  no - clinical signs or symptoms of DVT (3)  no - PE most likely diagnosis (3)  no - Heart rate greater than 100 (1 5)  no - Immobilization at least 3 days OR surgery in the previous 4 weeks (1 5)  no - Previous, objectively diagnosed PE or DVT (1 5)  no - Hemoptysis (1)  no - Malignancy with treatment within 6 months or palliative (1)    PERC Criteria for Pulmonary Embolism  yes - age is greater than or equal to 50  no - Heart rate greater than 100  no - O2 sat on room air < 95%  no - Prior history of PE or DVT  no - Recent trauma or surgery  no - Hemoptysis  no - Use of exogenous estrogen  no - Unilateral leg swelling    Patient has a low risk Wells' criteria but is unable to be cleared via the [address]  criteria  As such, a D-Dimer will be ordered for the patient to evaluate for the potential for pulmonary embolism  If positive, CT imaging of the patient's chest will be obtained to evaluate for potential pulmonary embolism      CXR obtained and reviewed independently by myself; this did not demonstrate any acute abnormalities  Labs obtained and reviewed and were essentially unremarkable  Initial troponin was negative  Repeated delta troponin and EKG at three hour agustin after initial troponin was negative, indicating a low likelihood of ACS  Laboratory and radiographic findings were discussed with the patient  There is a broad differential and I considered emergent diagnoses including pericarditis, ACS, angina, PE, pneumonia, rib fracture, and PTX, but these appear less likely considering the data gathered thus far  I have discussed potential options with the patient including observation, additional stress testing, or follow up with her cardiologist   Considering she has close follow up with her cardiologist and they have ordered a myoperfusion scan already, will contact them in the AM to discuss her continued elevated BP and continued chest pain following their evaluation  I have instructed the patient to return to the ER at any time if there are any new or worsening symptoms  The patient expressed understanding of and agreement with this plan  Opportunity was given for questions prior to discharge and all stated questions were answered to the patient's satisfaction  Home care instructions provided  Prior to Admission Medications   Prescriptions Last Dose Informant Patient Reported?  Taking?   aspirin 81 mg chewable tablet   Yes No   Sig: Chew 81 mg daily   chlorthalidone 25 mg tablet   No No   Sig: Take 1 tablet by mouth daily   fluticasone (FLONASE) 50 mcg/act nasal spray   No No   Si spray into each nostril daily   labetalol (NORMODYNE) 200 mg tablet   No No   Sig: Take 2 tablets by mouth every 12 (twelve) hours   magnesium oxide (MAG-OX) 400 mg   No No   Sig: Take 1 tablet by mouth daily for 5 days   meclizine (ANTIVERT) 25 mg tablet   No No   Sig: Take 1 tablet by mouth every 8 (eight) hours as needed for dizziness   omeprazole (PriLOSEC) 20 mg delayed release capsule   Yes No   Sig: Take 20 mg by mouth daily   potassium chloride (K-DUR,KLOR-CON) 20 mEq tablet   No No   Sig: Take 2 tablets by mouth 2 (two) times a day      Facility-Administered Medications: None       Past Medical History:   Diagnosis Date    Hypertension        Past Surgical History:   Procedure Laterality Date    BREAST CYST EXCISION         Family History   Problem Relation Age of Onset    Family history unknown: Yes     I have reviewed and agree with the history as documented      Social History   Substance Use Topics    Smoking status: Never Smoker    Smokeless tobacco: Never Used    Alcohol use Yes      Comment: Socially        Review of Systems    Physical Exam  ED Triage Vitals [10/24/17 1957]   Temperature Pulse Respirations Blood Pressure SpO2   (!) 97 3 °F (36 3 °C) 73 18 (!) 205/144 98 %      Temp Source Heart Rate Source Patient Position - Orthostatic VS BP Location FiO2 (%)   Temporal Monitor Sitting Right arm --      Pain Score       4           Physical Exam    ED Medications  Medications   acetaminophen (TYLENOL) tablet 975 mg (975 mg Oral Given 10/24/17 2202)   iohexol (OMNIPAQUE) 350 MG/ML injection (MULTI-DOSE) 85 mL (85 mL Intravenous Given 10/24/17 2240)   labetalol (NORMODYNE) injection 10 mg (10 mg Intravenous Given 10/24/17 2319)       Diagnostic Studies  Labs Reviewed   COMPREHENSIVE METABOLIC PANEL - Abnormal        Result Value Ref Range Status    Total Bilirubin 1 40 (*) 0 20 - 1 00 mg/dL Final    Sodium 138  136 - 145 mmol/L Final    Potassium 3 6  3 5 - 5 3 mmol/L Final    Chloride 102  100 - 108 mmol/L Final    CO2 31  21 - 32 mmol/L Final    Anion Gap 5  4 - 13 mmol/L Final    BUN 15  5 - 25 mg/dL Final    Creatinine 1 01  0 60 - 1 30 mg/dL Final    Comment: Standardized to IDMS reference method    Glucose 105  65 - 140 mg/dL Final    Comment:   If the patient is fasting, the ADA then defines impaired fasting glucose as > 100 mg/dL and diabetes as > or equal to 123 mg/dL  Specimen collection should occur prior to Sulfasalazine administration due to the potential for falsely depressed results  Specimen collection should occur prior to Sulfapyridine administration due to the potential for falsely elevated results  Calcium 9 6  8 3 - 10 1 mg/dL Final    AST 20  5 - 45 U/L Final    Comment:   Specimen collection should occur prior to Sulfasalazine administration due to the potential for falsely depressed results  ALT 30  12 - 78 U/L Final    Comment:   Specimen collection should occur prior to Sulfasalazine administration due to the potential for falsely depressed results  Alkaline Phosphatase 54  46 - 116 U/L Final    Total Protein 7 3  6 4 - 8 2 g/dL Final    Albumin 3 9  3 5 - 5 0 g/dL Final    eGFR 64  ml/min/1 73sq m Final    Narrative:     National Kidney Disease Education Program recommendations are as follows:  GFR calculation is accurate only with a steady state creatinine  Chronic Kidney disease less than 60 ml/min/1 73 sq  meters  Kidney failure less than 15 ml/min/1 73 sq  meters     CBC AND DIFFERENTIAL - Abnormal     WBC 3 83 (*) 4 31 - 10 16 Thousand/uL Final    RDW 15 3 (*) 11 6 - 15 1 % Final    Platelets 387 (*) 837 - 390 Thousands/uL Final    RBC 4 12  3 81 - 5 12 Million/uL Final    Hemoglobin 12 1  11 5 - 15 4 g/dL Final    Hematocrit 37 4  34 8 - 46 1 % Final    MCV 91  82 - 98 fL Final    MCH 29 4  26 8 - 34 3 pg Final    MCHC 32 4  31 4 - 37 4 g/dL Final    MPV 12 5  8 9 - 12 7 fL Final    nRBC 0  /100 WBCs Final    Neutrophils Relative 49  43 - 75 % Final    Lymphocytes Relative 35  14 - 44 % Final    Monocytes Relative 11  4 - 12 % Final    Eosinophils Relative 4  0 - 6 % Final    Basophils Relative 1  0 - 1 % Final    Neutrophils Absolute 1 88  1 85 - 7 62 Thousands/µL Final    Lymphocytes Absolute 1 33  0 60 - 4 47 Thousands/µL Final    Monocytes Absolute 0 43  0 17 - 1 22 Thousand/µL Final    Eosinophils Absolute 0 14  0 00 - 0 61 Thousand/µL Final    Basophils Absolute 0 03  0 00 - 0 10 Thousands/µL Final   D-DIMER, QUANTITATIVE - Abnormal     D-Dimer, Quant 1,197 (*) 0 - 424 ng/ml (FEU) Final    Comment:   Reference and upper limits to exclude DVT and PE are the same  Do not use to exclude if clinical symptoms are present  Pregnant women:  1st trimester:  <220 - 1060 ng/ml (FEU)  2nd trimester:  <220 - 1880 ng/ml (FEU)  3rd trimester:   238 - 3280 ng/ml (FEU)         TROPONIN I - Normal    Troponin I <0 02  <=0 04 ng/mL Final    Comment: 3Autovalidation override    Narrative:     [employer] Chemistry analyzer 99% cutoff is > 0 04 ng/mL in network labs    o cTnI 99% cutoff is useful only when applied to patients in the clinical setting of myocardial ischemia  o cTnI 99% cutoff should be interpreted in the context of clinical history, ECG findings and possibly cardiac imaging to establish correct diagnosis  o cTnI 99% cutoff may be suggestive but clearly not indicative of a coronary event without the clinical setting of myocardial ischemia  TROPONIN I - Normal    Troponin I <0 02  <=0 04 ng/mL Final    Comment: 3Autovalidation override    Narrative:     [employer] Chemistry analyzer 99% cutoff is > 0 04 ng/mL in network labs    o cTnI 99% cutoff is useful only when applied to patients in the clinical setting of myocardial ischemia  o cTnI 99% cutoff should be interpreted in the context of clinical history, ECG findings and possibly cardiac imaging to establish correct diagnosis  o cTnI 99% cutoff may be suggestive but clearly not indicative of a coronary event without the clinical setting of myocardial ischemia  LIGHT BLUE TOP       X-ray chest 1 view portable   Final Result      No active pulmonary disease  Workstation performed: [id number]         CTA ED chest PE study   Final Result      No evidence of pulmonary embolus  Stable pulmonary nodules  Continued follow-up is recommended                    Workstation performed: [id number]             Procedures  Procedures      Phone Contacts  ED Phone Contact    ED Course  ED Course as of Oct 26 0423   Tue Oct 24, 2017   2114 EKG demonstrates normal sinus rhythm with T wave inversions and flattening that appears similar to prior EKG  2126 Angelinaer Note 10/16/17 Cardiology:    Cristina Cranker is a 67-year old female referred by Valdez Wheatley PA-C, for evaluation of EKG changes  The patient notes pain below her left axilla that radiates into the lower breast area  The current EKG shows normal sinus rhythm with non-specific T-wave abnormality  PMH includes HTN, TIA, GERD, and family history of premature CAD (brother)  There are no prior cardiac imaging studies on file  Dr Aminata Linn reviewed the imaging referral and ordered an Echocardiogram and Nuclear Stress test  A pre-cert will be obtained [facility]) and the patient will be contacted and scheduled at [facility] within 1 month  Wed Oct 25, 2017   0213 Repeat EKG demonstrates continued nonspecific T-wave changes with normal sinus rhythm similar to the prior completed earlier  5603 Patient's repeat troponin is negative  Patient's pain currently resolved and is asymptomatic  Patient had myoperfusion scan in August completed here  Patient was seen by Cardiology for this pain 9 days ago  Patient continues to have this pain after being evaluated by Cardiology and has a repeat myoperfusion scan being scheduled  I discussed the findings of the evaluation with the patient  I discussed her continued hypertension  I discussed calling her cardiologist tomorrow for re-evaluation and reassessment  I discussed return precautions in detail                                  [facility]  CritCare Time    Disposition  Final diagnoses:   Chest pain   Lung nodule     Time reflects when diagnosis was documented in both MDM as applicable and the Disposition within this note     Time User Action Codes Description Comment    10/25/2017  2:36 AM Jayden Bran Kendall Danielle Add [R07 9] Chest pain     10/25/2017  2:39 AM Kareen Lanes Add [R91 1] Lung nodule       ED Disposition     ED Disposition Condition Comment    Discharge  Clemon Adjutant discharge to home/self care  Condition at discharge: Stable        Follow-up Information     Follow up With Specialties Details Why Lynne 18, DO Cardiology Schedule an appointment as soon as possible for a visit Follow up on chest pain  [place] [id number]  [phone]      Martha Guevara MD  Call Continued monitoring of lung nodules  [address]   [phone]          Discharge Medication List as of 10/25/2017  2:39 AM      CONTINUE these medications which have NOT CHANGED    Details   aspirin 81 mg chewable tablet Chew 81 mg daily, Starting Thu 9/20/2012, Historical Med      chlorthalidone 25 mg tablet Take 1 tablet by mouth daily, Starting Sat 8/5/2017, Print      fluticasone (FLONASE) 50 mcg/act nasal spray 1 spray into each nostril daily, Starting Fri 8/25/2017, Normal      labetalol (NORMODYNE) 200 mg tablet Take 2 tablets by mouth every 12 (twelve) hours, Starting Fri 8/25/2017, Normal      magnesium oxide (MAG-OX) 400 mg Take 1 tablet by mouth daily for 5 days, Starting Sat 8/5/2017, Until Fri 8/18/2017, Print      meclizine (ANTIVERT) 25 mg tablet Take 1 tablet by mouth every 8 (eight) hours as needed for dizziness, Starting Fri 8/25/2017, Normal      omeprazole (PriLOSEC) 20 mg delayed release capsule Take 20 mg by mouth daily, Starting Mon 4/10/2017, Historical Med      potassium chloride (K-DUR,KLOR-CON) 20 mEq tablet Take 2 tablets by mouth 2 (two) times a day, Starting Fri 8/25/2017, Normal           No discharge procedures on file      ED Provider  Electronically Signed by       Corrina Cohen MD  10/26/17 8756

## 2017-10-26 LAB
ATRIAL RATE: 62 BPM
P AXIS: 71 DEGREES
PR INTERVAL: 164 MS
QRS AXIS: 1 DEGREES
QRSD INTERVAL: 80 MS
QT INTERVAL: 384 MS
QTC INTERVAL: 389 MS
T WAVE AXIS: -19 DEGREES
VENTRICULAR RATE: 62 BPM

## 2017-10-26 NOTE — PROGRESS NOTES
Assessment  1  Benign hypertension (401 1) (I10)  2  Hypokalemia (276 8) (E87 6)  3  History of gastroesophageal reflux (GERD) (V12 79) (Z87 19)  4  Hypomagnesemia (275 2) (E83 42)    Plan  Benign hypertension, Hypokalemia    · (1) BASIC METABOLIC PROFILE; Status:Active; Requested for:05Kdk8958;   Perform:[facility]; Due:87Trk3473; Ordered; For:Benign hypertension,   Hypokalemia; Ordered By:Belinda Romero;   · (1) BASIC METABOLIC PROFILE; Status:Active; Requested for:63Lul2832;   Perform:[facility]; Due:03Bjs7426; Ordered; For:Benign hypertension,   Hypokalemia; Ordered By:Belinda Romero;   · (1) MICROALBUMIN CREATININE RATIO, RANDOM URINE; Status:Active; Requested  for:67Jrv6016;   Perform:[facility]; Due:27Drl5980; Ordered; For:Benign hypertension,   Hypokalemia; Ordered By:Belinda Romero;   · (1) PHOSPHORUS; Status:Active; Requested for:31Jrv0667;   Perform:[facility]; Due:49Kmg6466; Ordered; For:Benign hypertension,   Hypokalemia; Ordered By:Belinda Romero;   · (1) URINALYSIS WITH MICROSCOPIC; Status:Active; Requested for:49Dbu7868;   Perform:[facility]; Due:37Vhh6330; Ordered; For:Benign hypertension,   Hypokalemia; Ordered By:Belinda Romero;   · (1) URINALYSIS WITH MICROSCOPIC; Status:Active; Requested for:28Vfx3613;   Perform:[facility]; Due:03Ysy3678; Ordered; For:Benign hypertension,   Hypokalemia; Ordered By:Belinda Romero;  Hypokalemia    · Changed: From  Klor-Con M20 20 MEQ Oral Tablet Extended Release TAKE 2 TABLETS  DAILY To Klor-Con M20 20 MEQ Oral Tablet Extended Release TAKE 2 TABLETS TWICE  DAILY  Rx By: Maria E Rios; Dispense: 8 Days ; #:30 Tablet Extended Release; Refill: 0;For:   Hypokalemia; ROBERT = N; Record  Hypomagnesemia    · (1) MAGNESIUM; Status:Active; Requested for:07Wsu1288;   Perform:[facility]; Due:22Yse3216; Ordered; For:Hypomagnesemia;    Ordered By:Belinda Romero;   · (1) MAGNESIUM; Status:Active; Requested for:20Bbx5909;   Perform:[facility]; Due:74Scn0093; Ordered; For:Hypomagnesemia; Ordered By:Belinda Romero; Unlinked    · Stop: Carvedilol 25 MG Oral Tablet  Dispense: 0 Days ; #: Sufficient Tablet; Refill: 0; ROBERT = N; Record; Last Updated By:   Sylvia Hernandez; 9/14/2017 4:05:40 PM    Discussion/Summary    1  Hypertension  Essential  was admitted with hypertensive urgency and has medication changes done during the hospital stay  Currently patient is on chlorthalidone 25 mg q  daily along with labetalol 400 mg p o  b i d  Currently blood pressure is controlled and at goal and plan to monitor hypertension on current antihypertensive regimen  Patient was also advised to follow a low-salt diet  Hypokalemia  Multifactorial and was suspected due to use of chlorthalidone  Currently patient is on potassium chloride 40 mg p o  b i d  Last potassium was 3 4 at the time of discharge  Plan to recheck potassium level today and adjust the dose of potassium chloride as necessary  Hypomagnesemia  Multifactorial and was thought to be secondary to use of omeprazole/PPI    Now patient is off omeprazole but patient did receive magnesium supplement during the hospital course  In the past patient was taking magnesium supplement which was also stopped at the time of discharge  Plan to check magnesium level today and may consider magnesium supplement if found to have hypomagnesemia  to nephrology clinic in 3 months  Plan to check BMP, magnesium and urine analysis before next visit  can be reached at [phone]  [reviewed on 9/25/2017] - 1  1,2  called 2  1,2  and 2  1,2  left 2  1,2  a 1  1,2  message to the 1  1,2  patient  2  1,2  Creatinine 1 07 with a GFR of 60  Magnesium 1 6  Potassium 4 0 -> continue potassium chloride 40 mg p o  b i d  Urine analysis showed signs of dysuria -> start Cipro 500 mg p o  b i d  ? 5 days  Normal sodium, bicarbonate and calcium  prescription was sent to the pharmacy  1    The patient was counseled regarding diagnostic results,-- instructions for management,-- risk factor reductions,-- prognosis,-- patient and family education,-- impressions,-- risks and benefits of treatment options,-- importance of compliance with treatment  The patient has the current Goals: Check blood pressure regularly at home and make a log  Patient is able to Self-Care  Possible side effects of new medications were reviewed with the patient/guardian today  The treatment plan was reviewed with the patient/guardian  The patient/guardian understands and agrees with the treatment plan       1 Amended By: Anat Fernandes; Sep 25 2017 1:14 PM EST   2 Amended By: Anat Fernandes; Sep 25 2017 1:18 PM EST    Reason For Visit  Hospital follow-up for further management of hypertension      History of Present Illness  This is 57-year-old female initially admitted to [address] [place] [address] for hypertensive urgency  Patient has also developed dizziness during the hospital course  Patient was seen by nephrology service during the hospital course for management of hypertension and her blood pressure got under control with medication changes  Patient returns to nephrology clinic today for hospital follow-up for further management of hypertension  was also found to have hypokalemia which was thought to be secondary to use of chlorthalidone and currently patient is taking potassium chloride 40 mg p o  b i d   also history of GERD and was taking omeprazole on a regular basis but was also found to have hypomagnesemia and omeprazole was stopped during that time  Currently patient is not taking omeprazole on a regular basis or any magnesium supplements  Patient did receive IV magnesium supplement during the hospital stay  to patient her dizziness is currently better with the use of meclizine  takes all the medications as prescribed and does not use any NSAIDs        Review of Systems    Constitutional: no fever,-- no chills-- and-- no anorexia  Integumentary: no rashes  Gastrointestinal: no abdominal pain,-- no constipation-- and-- no nausea  Respiratory: no shortness of breath-- and-- no cough  Cardiovascular: no orthopnea,-- no PND,-- no chest pain-- and-- no lower extremity edema  Musculoskeletal: no joint swelling  Neurological: no headache  Genitourinary: no dysuria,-- no nocturia-- and-- no change in urinary frequency  Eyes: no dryness of the eyes  ENT: no nasal discharge  Psychiatric: not suicidal,-- no anxiety-- and-- no depression  ROS reviewed  Past Medical History    The active problems and past medical history were reviewed and updated today  Surgical History    The surgical history was reviewed and updated today  Family History    The family history was reviewed and updated today  Social History  The social history was reviewed and updated today  The social history was reviewed and is unchanged  Current Meds  1  Aspirin 81 MG Oral Tablet Chewable; CHEW AND SWALLOW 1 TABLET DAILY; Therapy: (Recorded:26Ldd8268) to Recorded  2  Carvedilol 25 MG Oral Tablet; TAKE 0 5 TABLET Twice daily; Therapy: (Recorded:92Idd7823) to Recorded  3  Chlorthalidone 25 MG Oral Tablet; TAKE 1 TABLET DAILY; Therapy: (Recorded:26Enl4309) to Recorded    The medication list was reviewed and updated today  Allergies  1  No Known Drug Allergies    Vitals  Vital Signs    Recorded: 14Sep2017 03:32PM   Temperature 98 3 F   Heart Rate 60   Systolic 180, Sitting   Diastolic 88, Sitting   Height 5 ft 0 6 in   Weight 196 lb 8 oz   BMI Calculated 37 62   BSA Calculated 1 87     Physical Exam    Constitutional: General appearance: No acute distress, well appearing and well nourished  ENT: External ears and nose appear normal      Eyes: Anicteric sclerae  Neck: No bruit heard over either carotid  JVD:  No JVD present     Pulmonary: Respiratory effort: No increased work of breathing or signs of respiratory distress  -- Auscultation of lungs: Clear to auscultation  Cardiovascular: Auscultation of heart: Normal rate and rhythm, normal S1 and S2, without murmurs  Abdomen: Non-tender, no masses  Extremities: No cyanosis, clubbing or edema  Pulses: Dorsalis Pedis and Posterior Tibial pulses normal    Rash: No rash present  Neurologic: Non Focal      Psychiatric: Orientation to person, place, and time: Normal  -- and-- Mood and affect: Normal     Back: No CVA tenderness  Labs done on 8/25/2017 showed creatinine 0 89 with a GFR more than 60  Potassium 3 4  Future Appointments    Date/Time Provider Specialty Site   12/14/2017 04:15 PM ROSI Lopez   Nephrology [facility]     Signatures   Electronically signed by : ROSI Cook ; Sep 14 2017  4:09PM EST                       (Author)    Electronically signed by : ROSI Cook ; Sep 25 2017  1:16PM EST                       (Author)    Electronically signed by : ROSI Cook ; Sep 25 2017  1:18PM EST                       (Author)

## 2018-01-09 NOTE — RESULT NOTES
Verified Results  (1) PHOSPHORUS 88Vin6595 03:36PM Miramontes Barrs     Test Name Result Flag Reference   PHOSPHORUS 2 9 mg/dL  2 3-4 1     (1) MAGNESIUM 11Spo3157 03:36PM Miramontes Barrs     Test Name Result Flag Reference   MAGNESIUM 1 6 mg/dL  1 6-2 6     (1) BASIC METABOLIC PROFILE 03GHQ5303 03:36PM Miramontes Barrs     Test Name Result Flag Reference   GLUCOSE,RANDM 92 mg/dL     If the patient is fasting, the ADA then defines impaired fasting glucose as > 100 mg/dL and diabetes as > or equal to 123 mg/dL  Specimen collection should occur prior to Sulfasalazine administration due to the potential for falsely depressed results  Specimen collection should occur prior to Sulfapyridine administration due to the potential for falsely elevated results  SODIUM 140 mmol/L  136-145   POTASSIUM 4 0 mmol/L  3 5-5 3   CHLORIDE 104 mmol/L  100-108   CARBON DIOXIDE 29 mmol/L  21-32   ANION GAP (CALC) 7 mmol/L  4-13   BLOOD UREA NITROGEN 13 mg/dL  5-25   CREATININE 1 07 mg/dL  0 60-1 30   Standardized to IDMS reference method   CALCIUM 10 0 mg/dL  8 3-10 1   eGFR 60 ml/min/1 73sq [facility] Disease Education Program recommendations are as follows:  GFR calculation is accurate only with a steady state creatinine  Chronic Kidney disease less than 60 ml/min/1 73 sq  meters  Kidney failure less than 15 ml/min/1 73 sq  meters       (1) URINALYSIS WITH MICROSCOPIC 56Wcm5964 03:36PM Miramontes Barrs     Test Name Result Flag Reference   COLOR Yellow     CLARITY Clear     PH UA 7 5  4 5-8 0   LEUKOCYTE ESTERASE UA Trace A Negative   NITRITE UA Negative  Negative   PROTEIN UA Trace mg/dl A Negative   GLUCOSE UA Negative mg/dl  Negative   KETONES UA Negative mg/dl  Negative   UROBILINOGEN UA 0 2 E U /dl  0 2, 1 0 E U /dl   BILIRUBIN UA Negative  Negative   BLOOD UA Trace-Intact A Negative   SPECIFIC GRAVITY UA 1 020  1 003-1 030   WBC UA 10-20 /hpf A None Seen, 0-5, 5-55, 5-65   RBC UA 1-2 /hpf A None Seen, 0-5   BACTERIA Occasional /hpf  None Seen, Occasional   EPITHELIAL CELLS Occasional /hpf  None Seen, Occasional   MUCOUS THREADS Occasional  Occasional, Moderate, Innumerable       Plan  Benign hypertension, Hypokalemia    · (1) BASIC METABOLIC PROFILE; Status:Active; Requested for:63Gkz4520;    · (1) BASIC METABOLIC PROFILE; Status:Complete;   Done: 85AIJ0766 03:36PM   · (1) MICROALBUMIN CREATININE RATIO, RANDOM URINE; Status:Active; Requested  for:70Jxy3545;    · (1) PHOSPHORUS; Status:Complete;   Done: 27GQD0856 03:36PM   · (1) URINALYSIS WITH MICROSCOPIC; Status:Active; Requested for:53Fly6901;    · (1) URINALYSIS WITH MICROSCOPIC; Status:Complete;   Done: 20ZPO0073 03:36PM   · (1) URINALYSIS WITH MICROSCOPIC; Status:Complete;   Done: 17Lym4579 03:36PM  Dysuria    · Ciprofloxacin HCl - 500 MG Oral Tablet; TAKE 1 TABLET EVERY 12 HOURS DAILY  Hypokalemia    · From  Klor-Con M20 20 MEQ Oral Tablet Extended Release TAKE 2 TABLETS  DAILY To Klor-Con M20 20 MEQ Oral Tablet Extended Release TAKE 2 TABLETS TWICE  DAILY  Hypomagnesemia    · (1) MAGNESIUM; Status:Active;  Requested for:40Sqx3541;    · (1) MAGNESIUM; Status:Complete;   Done: 97UNP2495 03:36PM   · (1) MAGNESIUM; Status:Complete;   Done: 72QPC2602 03:36PM  Unlinked    · Carvedilol 25 MG Oral Tablet

## 2018-01-13 VITALS
WEIGHT: 196.5 LBS | HEART RATE: 60 BPM | BODY MASS INDEX: 37.1 KG/M2 | DIASTOLIC BLOOD PRESSURE: 88 MMHG | TEMPERATURE: 98.3 F | HEIGHT: 61 IN | SYSTOLIC BLOOD PRESSURE: 130 MMHG

## 2018-09-07 NOTE — PLAN OF CARE
Problem: PHYSICAL THERAPY ADULT  Goal: Performs mobility at highest level of function for planned discharge setting  See evaluation for individualized goals  Treatment/Interventions: Functional transfer training, Elevations, Therapeutic exercise, Endurance training, Patient/family training, Spoke to nursing  Equipment Recommended:  (none at this time)       See flowsheet documentation for full assessment, interventions and recommendations  Prognosis: Good  Problem List: Decreased endurance, Impaired balance, Decreased mobility  Assessment: Pt is 66 y o  female seen for PT evaluation on 8/18/2017 s/p admit to [facility] on 8/18/2017 w/ Uncontrolled hypertension; pt presented to ED w/ symptoms consistent w/ lightheadedness  PT consulted to assess pt's functional mobility and d/c needs  Order placed for PT eval and tx  Comorbidities affecting pt's physical performance at time of assessment include: NADEEN, HTN  PTA, pt was independent w/ all functional mobility w/ no AD or DME, ambulates community distances and elevations, has 3 COURTNEY, lives w/ dtr in 2 level home and retired  Personal factors affecting pt at time of IE include: lives in 2 story house and stairs to enter home  Please find objective findings from PT assessment regarding body systems outlined above with impairments and limitations including impaired balance, decreased endurance and fall risk, as well as mobility assessment (supervision required for all phases of functional mobility at this time)  The following objective measures performed on IE also reveal limitations: Barthel Index: 85/100 and Modified Gilmer: 2 (slight disability)  Pt to benefit from continued PT tx to address deficits as defined above and maximize level of functional independent mobility and consistency  From PT/mobility standpoint, recommendation at time of d/c would be anticipate no needs pending progress in order to facilitate return to PLOF    Barriers to Discharge: None Recommendation:  (anticipate no needs)     PT - OK to Discharge: Yes (when medically cleared)    See flowsheet documentation for full assessment  Attending

## 2020-06-16 ENCOUNTER — HOSPITAL ENCOUNTER (EMERGENCY)
Facility: HOSPITAL | Age: 75
Discharge: HOME | End: 2020-06-16
Attending: EMERGENCY MEDICINE
Payer: COMMERCIAL

## 2020-06-16 ENCOUNTER — APPOINTMENT (EMERGENCY)
Dept: CT IMAGING | Facility: HOSPITAL | Age: 75
End: 2020-06-16
Payer: COMMERCIAL

## 2020-06-16 VITALS
OXYGEN SATURATION: 100 % | HEIGHT: 66 IN | BODY MASS INDEX: 29.62 KG/M2 | SYSTOLIC BLOOD PRESSURE: 189 MMHG | WEIGHT: 184.3 LBS | DIASTOLIC BLOOD PRESSURE: 92 MMHG | TEMPERATURE: 97.8 F | RESPIRATION RATE: 18 BRPM | HEART RATE: 74 BPM

## 2020-06-16 DIAGNOSIS — K57.92 ACUTE DIVERTICULITIS: ICD-10-CM

## 2020-06-16 DIAGNOSIS — K76.9 LIVER LESION: ICD-10-CM

## 2020-06-16 DIAGNOSIS — I10 HYPERTENSION: ICD-10-CM

## 2020-06-16 DIAGNOSIS — R10.9 LEFT FLANK PAIN: Primary | ICD-10-CM

## 2020-06-16 DIAGNOSIS — N28.1 BILATERAL RENAL CYSTS: ICD-10-CM

## 2020-06-16 DIAGNOSIS — D72.819 LEUKOPENIA: ICD-10-CM

## 2020-06-16 DIAGNOSIS — N39.0 URINARY TRACT INFECTION: ICD-10-CM

## 2020-06-16 LAB
ANION GAP SERPL CALCULATED.3IONS-SCNC: 6 MMOL/L (ref 4–13)
BACTERIA UR QL AUTO: ABNORMAL /HPF
BASOPHILS # BLD AUTO: 0.02 THOUSANDS/ΜL (ref 0–0.1)
BASOPHILS NFR BLD AUTO: 1 % (ref 0–1)
BILIRUB UR QL STRIP: NEGATIVE
BUN SERPL-MCNC: 12 MG/DL (ref 5–25)
CALCIUM SERPL-MCNC: 9.4 MG/DL (ref 8.3–10.1)
CHLORIDE SERPL-SCNC: 105 MMOL/L (ref 100–108)
CLARITY UR: CLEAR
CO2 SERPL-SCNC: 30 MMOL/L (ref 21–32)
COLOR UR: YELLOW
CREAT SERPL-MCNC: 0.96 MG/DL (ref 0.6–1.3)
EOSINOPHIL # BLD AUTO: 0.08 THOUSAND/ΜL (ref 0–0.61)
EOSINOPHIL NFR BLD AUTO: 3 % (ref 0–6)
ERYTHROCYTE [DISTWIDTH] IN BLOOD BY AUTOMATED COUNT: 14.4 % (ref 11.6–15.1)
GFR SERPL CREATININE-BSD FRML MDRD: 66 ML/MIN/1.73SQ M
GLUCOSE SERPL-MCNC: 91 MG/DL (ref 65–140)
GLUCOSE UR STRIP-MCNC: NEGATIVE MG/DL
HCT VFR BLD AUTO: 36.2 % (ref 34.8–46.1)
HGB BLD-MCNC: 11.2 G/DL (ref 11.5–15.4)
HGB UR QL STRIP.AUTO: ABNORMAL
IMM GRANULOCYTES # BLD AUTO: 0 THOUSAND/UL (ref 0–0.2)
IMM GRANULOCYTES NFR BLD AUTO: 0 % (ref 0–2)
KETONES UR STRIP-MCNC: NEGATIVE MG/DL
LEUKOCYTE ESTERASE UR QL STRIP: ABNORMAL
LYMPHOCYTES # BLD AUTO: 0.74 THOUSANDS/ΜL (ref 0.6–4.47)
LYMPHOCYTES NFR BLD AUTO: 26 % (ref 14–44)
MCH RBC QN AUTO: 28.9 PG (ref 26.8–34.3)
MCHC RBC AUTO-ENTMCNC: 30.9 G/DL (ref 31.4–37.4)
MCV RBC AUTO: 94 FL (ref 82–98)
MONOCYTES # BLD AUTO: 0.32 THOUSAND/ΜL (ref 0.17–1.22)
MONOCYTES NFR BLD AUTO: 11 % (ref 4–12)
NEUTROPHILS # BLD AUTO: 1.71 THOUSANDS/ΜL (ref 1.85–7.62)
NEUTS SEG NFR BLD AUTO: 59 % (ref 43–75)
NITRITE UR QL STRIP: POSITIVE
NON-SQ EPI CELLS URNS QL MICRO: ABNORMAL /HPF
NRBC BLD AUTO-RTO: 0 /100 WBCS
PH UR STRIP.AUTO: 7 [PH]
PLATELET # BLD AUTO: 160 THOUSANDS/UL (ref 149–390)
PMV BLD AUTO: 12.2 FL (ref 8.9–12.7)
POTASSIUM SERPL-SCNC: 3.7 MMOL/L (ref 3.5–5.3)
PROT UR STRIP-MCNC: NEGATIVE MG/DL
RBC # BLD AUTO: 3.87 MILLION/UL (ref 3.81–5.12)
RBC #/AREA URNS AUTO: ABNORMAL /HPF
SODIUM SERPL-SCNC: 141 MMOL/L (ref 136–145)
SP GR UR STRIP.AUTO: 1.01 (ref 1–1.03)
TROPONIN I SERPL-MCNC: <0.02 NG/ML
UROBILINOGEN UR QL STRIP.AUTO: 0.2 E.U./DL
WBC # BLD AUTO: 2.87 THOUSAND/UL (ref 4.31–10.16)
WBC #/AREA URNS AUTO: ABNORMAL /HPF

## 2020-06-16 PROCEDURE — 99285 EMERGENCY DEPT VISIT HI MDM: CPT | Performed by: EMERGENCY MEDICINE

## 2020-06-16 PROCEDURE — 96365 THER/PROPH/DIAG IV INF INIT: CPT

## 2020-06-16 PROCEDURE — 99284 EMERGENCY DEPT VISIT MOD MDM: CPT

## 2020-06-16 PROCEDURE — 87086 URINE CULTURE/COLONY COUNT: CPT | Performed by: EMERGENCY MEDICINE

## 2020-06-16 PROCEDURE — 74175 CTA ABDOMEN W/CONTRAST: CPT

## 2020-06-16 PROCEDURE — 84484 ASSAY OF TROPONIN QUANT: CPT | Performed by: EMERGENCY MEDICINE

## 2020-06-16 PROCEDURE — 71275 CT ANGIOGRAPHY CHEST: CPT

## 2020-06-16 PROCEDURE — 81001 URINALYSIS AUTO W/SCOPE: CPT | Performed by: EMERGENCY MEDICINE

## 2020-06-16 PROCEDURE — 87186 SC STD MICRODIL/AGAR DIL: CPT | Performed by: EMERGENCY MEDICINE

## 2020-06-16 PROCEDURE — 80048 BASIC METABOLIC PNL TOTAL CA: CPT | Performed by: EMERGENCY MEDICINE

## 2020-06-16 PROCEDURE — 87077 CULTURE AEROBIC IDENTIFY: CPT | Performed by: EMERGENCY MEDICINE

## 2020-06-16 PROCEDURE — 36415 COLL VENOUS BLD VENIPUNCTURE: CPT | Performed by: EMERGENCY MEDICINE

## 2020-06-16 PROCEDURE — 93005 ELECTROCARDIOGRAM TRACING: CPT

## 2020-06-16 PROCEDURE — 96375 TX/PRO/DX INJ NEW DRUG ADDON: CPT

## 2020-06-16 PROCEDURE — 85025 COMPLETE CBC W/AUTO DIFF WBC: CPT | Performed by: EMERGENCY MEDICINE

## 2020-06-16 RX ORDER — ONDANSETRON 4 MG/1
4 TABLET, ORALLY DISINTEGRATING ORAL EVERY 6 HOURS PRN
Qty: 20 TABLET | Refills: 0 | Status: SHIPPED | OUTPATIENT
Start: 2020-06-16

## 2020-06-16 RX ORDER — CEFDINIR 300 MG/1
300 CAPSULE ORAL EVERY 12 HOURS SCHEDULED
Status: DISCONTINUED | OUTPATIENT
Start: 2020-06-16 | End: 2020-06-16 | Stop reason: HOSPADM

## 2020-06-16 RX ORDER — FENTANYL CITRATE 50 UG/ML
50 INJECTION, SOLUTION INTRAMUSCULAR; INTRAVENOUS ONCE
Status: COMPLETED | OUTPATIENT
Start: 2020-06-16 | End: 2020-06-16

## 2020-06-16 RX ORDER — LABETALOL 100 MG/1
400 TABLET, FILM COATED ORAL ONCE
Status: COMPLETED | OUTPATIENT
Start: 2020-06-16 | End: 2020-06-16

## 2020-06-16 RX ORDER — METRONIDAZOLE 500 MG/1
500 TABLET ORAL EVERY 8 HOURS SCHEDULED
Qty: 29 TABLET | Refills: 0 | Status: SHIPPED | OUTPATIENT
Start: 2020-06-16 | End: 2020-06-26

## 2020-06-16 RX ORDER — HYDRALAZINE HYDROCHLORIDE 20 MG/ML
10 INJECTION INTRAMUSCULAR; INTRAVENOUS ONCE
Status: COMPLETED | OUTPATIENT
Start: 2020-06-16 | End: 2020-06-16

## 2020-06-16 RX ORDER — CEFDINIR 300 MG/1
300 CAPSULE ORAL EVERY 12 HOURS SCHEDULED
Qty: 20 CAPSULE | Refills: 0 | Status: SHIPPED | OUTPATIENT
Start: 2020-06-16 | End: 2020-06-26

## 2020-06-16 RX ORDER — HYDROCODONE BITARTRATE AND ACETAMINOPHEN 5; 325 MG/1; MG/1
1 TABLET ORAL EVERY 6 HOURS PRN
Qty: 10 TABLET | Refills: 0 | Status: SHIPPED | OUTPATIENT
Start: 2020-06-16

## 2020-06-16 RX ADMIN — CEFDINIR 300 MG: 300 CAPSULE ORAL at 20:11

## 2020-06-16 RX ADMIN — LABETALOL HYDROCHLORIDE 400 MG: 100 TABLET, FILM COATED ORAL at 16:20

## 2020-06-16 RX ADMIN — HYDRALAZINE HYDROCHLORIDE 10 MG: 20 INJECTION INTRAMUSCULAR; INTRAVENOUS at 19:01

## 2020-06-16 RX ADMIN — FENTANYL CITRATE 50 MCG: 50 INJECTION INTRAMUSCULAR; INTRAVENOUS at 16:14

## 2020-06-16 RX ADMIN — IOHEXOL 100 ML: 350 INJECTION, SOLUTION INTRAVENOUS at 18:11

## 2020-06-16 RX ADMIN — METRONIDAZOLE 500 MG: 500 INJECTION, SOLUTION INTRAVENOUS at 19:44

## 2020-06-17 LAB
ATRIAL RATE: 68 BPM
P AXIS: 65 DEGREES
PR INTERVAL: 160 MS
QRS AXIS: 11 DEGREES
QRSD INTERVAL: 80 MS
QT INTERVAL: 364 MS
QTC INTERVAL: 387 MS
T WAVE AXIS: 3 DEGREES
VENTRICULAR RATE: 68 BPM

## 2020-06-17 PROCEDURE — 93010 ELECTROCARDIOGRAM REPORT: CPT | Performed by: INTERNAL MEDICINE

## 2020-06-18 LAB
BACTERIA UR CULT: ABNORMAL
BACTERIA UR CULT: ABNORMAL

## 2021-03-08 ENCOUNTER — APPOINTMENT (EMERGENCY)
Dept: CT IMAGING | Facility: HOSPITAL | Age: 76
End: 2021-03-08

## 2021-03-08 ENCOUNTER — HOSPITAL ENCOUNTER (EMERGENCY)
Facility: HOSPITAL | Age: 76
Discharge: HOME | End: 2021-03-08
Attending: EMERGENCY MEDICINE | Admitting: EMERGENCY MEDICINE

## 2021-03-08 VITALS
BODY MASS INDEX: 29.57 KG/M2 | DIASTOLIC BLOOD PRESSURE: 88 MMHG | OXYGEN SATURATION: 100 % | HEIGHT: 66 IN | SYSTOLIC BLOOD PRESSURE: 197 MMHG | HEART RATE: 73 BPM | WEIGHT: 184 LBS | RESPIRATION RATE: 16 BRPM | TEMPERATURE: 98.6 F
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
## 2021-03-08 DIAGNOSIS — M54.2 NECK PAIN: ICD-10-CM

## 2021-03-08 PROCEDURE — 72125 CT NECK SPINE W/O DYE: CPT

## 2021-03-08 PROCEDURE — 99284 EMERGENCY DEPT VISIT MOD MDM: CPT

## 2021-03-08 PROCEDURE — 99284 EMERGENCY DEPT VISIT MOD MDM: CPT | Performed by: EMERGENCY MEDICINE

## 2021-03-08 PROCEDURE — G1004 CDSM NDSC: HCPCS

## 2021-03-08 RX ORDER — ACETAMINOPHEN 325 MG/1
975 TABLET ORAL ONCE
Status: COMPLETED | OUTPATIENT
Start: 2021-03-08 | End: 2021-03-08

## 2021-03-08 RX ADMIN — ACETAMINOPHEN 975 MG: 325 TABLET, COATED ORAL at 15:50

## 2021-07-08 ENCOUNTER — EVALUATION (OUTPATIENT)
Dept: PHYSICAL THERAPY | Facility: CLINIC | Age: 76
End: 2021-07-08
Payer: COMMERCIAL

## 2021-07-08 DIAGNOSIS — G89.29 CHRONIC PAIN OF LEFT KNEE: ICD-10-CM

## 2021-07-08 DIAGNOSIS — M25.562 CHRONIC PAIN OF LEFT KNEE: ICD-10-CM

## 2021-07-08 DIAGNOSIS — G89.29 CHRONIC PAIN OF RIGHT KNEE: Primary | ICD-10-CM

## 2021-07-08 DIAGNOSIS — M25.561 CHRONIC PAIN OF RIGHT KNEE: Primary | ICD-10-CM

## 2021-07-08 PROCEDURE — 97161 PT EVAL LOW COMPLEX 20 MIN: CPT | Performed by: PHYSICAL THERAPIST

## 2021-07-08 PROCEDURE — 97110 THERAPEUTIC EXERCISES: CPT | Performed by: PHYSICAL THERAPIST

## 2021-07-08 NOTE — LETTER
2021    Zara Franz MD  [address]    Patient: Alisia Monreal   YOB: 1945   Date of Visit: 2021     Encounter Diagnosis     ICD-10-CM    1  Chronic pain of right knee  M25 561     G89 29    2  Chronic pain of left knee  M25 562     G89 29        Dear Dr Chase Lanier: Thank you for your recent referral of Alisia Monreal  Please review the attached evaluation summary from Barb's recent visit  Please verify that you agree with the plan of care by signing the attached order  If you have any questions or concerns, please do not hesitate to call  I sincerely appreciate the opportunity to share in the care of one of your patients and hope to have another opportunity to work with you in the near future  Sincerely,    Yue Kaba, PT      Referring Provider:      I certify that I have read the below Plan of Care and certify the need for these services furnished under this plan of treatment while under my care  Zara Franz MD  [address] [place] [address]  Via Fax: [phone]          PT Evaluation     Today's date: 2021  Patient name: Alisia Monreal  : 1945  MRN: [id number]  Referring provider: Ke Simons MD  Dx:   Encounter Diagnosis     ICD-10-CM    1  Chronic pain of right knee  M25 561     G89 29    2   Chronic pain of left knee  M25 562     G89 29        Start Time: 5812  Stop Time: 1500  Total time in clinic (min): 45 minutes    Assessment  Assessment details: Patient presents with BL knee pain, insidious onset, demonstrates decreased Rom and Strength, reports difficulty ascending/descending stairs; patient would benefit from therapeutic exercise, manual therapy, gait, balance, and stability training, modalities PRN; patient issued HEP  Impairments: abnormal or restricted ROM, impaired physical strength and pain with function  Understanding of Dx/Px/POC: good   Prognosis: good    Goals  STGs  1  Decrease pain 50% in 2 weeks  2  Increase Rom 50% in 2 weeks  3  Increase Strength half grade in 2 weeks  4  Compliant with HEP in 2 weeks  LTGs  1  Decrease pain to mild to none in 4 weeks  2  Increase Rom WNL in 4 weeks  3  Increase Strength WNL in 4 weeks  4   Able to ascend/descend stairs with mild to no difficulty in 4 weeks    Plan  Patient would benefit from: skilled physical therapy  Planned modality interventions: hydrotherapy  Planned therapy interventions: manual therapy, neuromuscular re-education, patient education, therapeutic exercise, home exercise program, functional ROM exercises and balance  Frequency: 2x week  Duration in weeks: 4        Subjective Evaluation    History of Present Illness  Mechanism of injury: Insidious, about 3 months ago          Not a recurrent problem   Quality of life: good    Pain  Current pain ratin  At best pain ratin  At worst pain ratin  Quality: sharp and throbbing  Relieving factors: medications  Aggravating factors: stair climbing    Social Support  Steps to enter house: yes  Stairs in house: yes   Lives in: multiple-level home  Lives with: adult children    Employment status: not working    Diagnostic Tests  X-ray: abnormal  Treatments  Current treatment: injection treatment  Patient Goals  Patient goals for therapy: increased strength, decreased pain and increased motion  Patient goal: ascend/descend stairs        Objective     Active Range of Motion   Left Knee   Flexion: 95 degrees   Extension: WFL    Right Knee   Flexion: 95 degrees   Extension: [facility]    Strength/Myotome Testing     Left Hip   Planes of Motion   Flexion: 4    Right Hip   Planes of Motion   Flexion: 4    Left Knee   Flexion: 4  Extension: 4    Right Knee   Flexion: 4  Extension: 4    Left Ankle/Foot   Dorsiflexion: 4    Right Ankle/Foot   Dorsiflexion: 4             Precautions: none      Manual             MFR             Patellar mobs Neuro Re-Ed             Stance on foam             Stability disc             Foam balance beam             hurdles                                                    Ther Ex             Bike             SLR/VMO SLR             / bar 5-way             Step up/dwn/sdws             Seated hip flex/knee ext/add with ball/abd with band             CS/HS             Mini squats             PT ed - HEP 15            Ther Activity             stairs                          Gait Training             TM                          Modalities             MH

## 2021-07-08 NOTE — PROGRESS NOTES
PT Evaluation     Today's date: 2021  Patient name: Sesar Ayala  : 1945  MRN: [id number]  Referring provider: Hayley Marquez MD  Dx:   Encounter Diagnosis     ICD-10-CM    1  Chronic pain of right knee  M25 561     G89 29    2  Chronic pain of left knee  M25 562     G89 29        Start Time: 1415  Stop Time: 1500  Total time in clinic (min): 45 minutes    Assessment  Assessment details: Patient presents with BL knee pain, insidious onset, demonstrates decreased Rom and Strength, reports difficulty ascending/descending stairs; patient would benefit from therapeutic exercise, manual therapy, gait, balance, and stability training, modalities PRN; patient issued HEP  Impairments: abnormal or restricted ROM, impaired physical strength and pain with function  Understanding of Dx/Px/POC: good   Prognosis: good    Goals  STGs  1  Decrease pain 50% in 2 weeks  2  Increase Rom 50% in 2 weeks  3  Increase Strength half grade in 2 weeks  4  Compliant with HEP in 2 weeks  LTGs  1  Decrease pain to mild to none in 4 weeks  2  Increase Rom WNL in 4 weeks  3  Increase Strength WNL in 4 weeks  4   Able to ascend/descend stairs with mild to no difficulty in 4 weeks    Plan  Patient would benefit from: skilled physical therapy  Planned modality interventions: hydrotherapy  Planned therapy interventions: manual therapy, neuromuscular re-education, patient education, therapeutic exercise, home exercise program, functional ROM exercises and balance  Frequency: 2x week  Duration in weeks: 4        Subjective Evaluation    History of Present Illness  Mechanism of injury: Insidious, about 3 months ago          Not a recurrent problem   Quality of life: good    Pain  Current pain ratin  At best pain ratin  At worst pain ratin  Quality: sharp and throbbing  Relieving factors: medications  Aggravating factors: stair climbing    Social Support  Steps to enter house: yes  Stairs in house: yes   Lives in: multiple-level home  Lives with: adult children    Employment status: not working    Diagnostic Tests  X-ray: abnormal  Treatments  Current treatment: injection treatment  Patient Goals  Patient goals for therapy: increased strength, decreased pain and increased motion  Patient goal: ascend/descend stairs        Objective     Active Range of Motion   Left Knee   Flexion: 95 degrees   Extension: WFL    Right Knee   Flexion: 95 degrees   Extension: WFL    Strength/Myotome Testing     Left Hip   Planes of Motion   Flexion: 4    Right Hip   Planes of Motion   Flexion: 4    Left Knee   Flexion: 4  Extension: 4    Right Knee   Flexion: 4  Extension: 4    Left Ankle/Foot   Dorsiflexion: 4    Right Ankle/Foot   Dorsiflexion: 4             Precautions: none      Manual 7/8            MFR             Patellar mobs                                       Neuro Re-Ed             Stance on foam             Stability disc             Foam balance beam             hurdles                                                    Ther Ex             Bike             SLR/VMO SLR             / bar 5-way             Step up/dwn/sdws             Seated hip flex/knee ext/add with ball/abd with band             CS/HS             Mini squats             PT ed - HEP 15            Ther Activity             stairs                          Gait Training             TM                          Modalities

## 2021-07-12 ENCOUNTER — OFFICE VISIT (OUTPATIENT)
Dept: PHYSICAL THERAPY | Facility: CLINIC | Age: 76
End: 2021-07-12
Payer: COMMERCIAL

## 2021-07-12 DIAGNOSIS — M25.562 CHRONIC PAIN OF LEFT KNEE: ICD-10-CM

## 2021-07-12 DIAGNOSIS — G89.29 CHRONIC PAIN OF RIGHT KNEE: Primary | ICD-10-CM

## 2021-07-12 DIAGNOSIS — M25.561 CHRONIC PAIN OF RIGHT KNEE: Primary | ICD-10-CM

## 2021-07-12 DIAGNOSIS — G89.29 CHRONIC PAIN OF LEFT KNEE: ICD-10-CM

## 2021-07-12 PROCEDURE — 97140 MANUAL THERAPY 1/> REGIONS: CPT | Performed by: PHYSICAL THERAPIST

## 2021-07-12 PROCEDURE — 97110 THERAPEUTIC EXERCISES: CPT | Performed by: PHYSICAL THERAPIST

## 2021-07-12 NOTE — PROGRESS NOTES
Daily Note     Today's date: 2021  Patient name: Mary Berrios  : 1945  MRN: [id number]  Referring provider: Jadyn Wang MD  Dx:   Encounter Diagnosis     ICD-10-CM    1  Chronic pain of right knee  M25 561     G89 29    2  Chronic pain of left knee  M25 562     G89 29                   Subjective: Pt reports that she is not having so much pain today- states that she is feeling more that she needs to work on strengthening  Objective: See treatment diary below      Assessment: Exercises tolerated well and w/o pain  However, pt fatigued post tx session  Pt also slightly light headed after supine ex  Assessed BP which was 198/110 BPM   Pt was instructed to immediately call PCP or go immediately over to ER/urgent care if she cannot get a hold of doctor  Patient would benefit from continued PT      Plan: Continue per plan of care  Monitor BP        Precautions: HTN- takes meds, but high       Manual            MFR  RB           Patellar mobs  RB                        Total time             Neuro Re-Ed             Stance on foam             Stability disc             Foam balance beam             hurdles                                                    Ther Ex             BP assessed  5'           Bike  8'           SLR/VMO SLR  15x b/l ; 10x b/l            S/l hip abduction  10x b/l            / bar 5-way             Step up/dwn/sdws  10x b/l step ups           Seated hip flex/knee ext/add with ball/abd with band             CS/HS  30"x3           Mini squats  5" x 10           PT ed - HEP 15            Ther Activity             stairs                          Gait Training             TM                          Modalities

## 2021-07-14 ENCOUNTER — OFFICE VISIT (OUTPATIENT)
Dept: PHYSICAL THERAPY | Facility: CLINIC | Age: 76
End: 2021-07-14
Payer: COMMERCIAL

## 2021-07-14 DIAGNOSIS — G89.29 CHRONIC PAIN OF RIGHT KNEE: Primary | ICD-10-CM

## 2021-07-14 DIAGNOSIS — M25.561 CHRONIC PAIN OF RIGHT KNEE: Primary | ICD-10-CM

## 2021-07-14 DIAGNOSIS — M25.562 CHRONIC PAIN OF LEFT KNEE: ICD-10-CM

## 2021-07-14 DIAGNOSIS — G89.29 CHRONIC PAIN OF LEFT KNEE: ICD-10-CM

## 2021-07-14 PROCEDURE — 97110 THERAPEUTIC EXERCISES: CPT

## 2021-07-14 NOTE — PROGRESS NOTES
Daily Note     Today's date: 2021  Patient name: Kourtney Strickland  : 1945  MRN: [id number]  Referring provider: Teo Elias MD  Dx:   Encounter Diagnosis     ICD-10-CM    1  Chronic pain of right knee  M25 561     G89 29    2  Chronic pain of left knee  M25 562     G89 29                   Subjective: Patient reports her knees "aren't too bad today " She went to her doctor after previous therapy session as her BP was high and it had come down to 124/70  Objective: See treatment diary below      Assessment: Took patient BP at start of session with a reading of 150/80 mmHg  Pt had a phone call after she completed the bike that her family needed her to leave therapy as there was an emergency at the house and had to leave therapy early  Progress as able  NV  Plan: Continue per plan of care        Precautions: HTN- takes meds, but high       Manual           MFR  RB           Patellar mobs  RB                        Total time             Neuro Re-Ed             Stance on foam             Stability disc             Foam balance beam             hurdles                                                    Ther Ex             BP assessed  5' 5'          Bike  8' 7'          SLR/VMO SLR  15x b/l ; 10x b/l            S/l hip abduction  10x b/l            / bar 5-way             Step up/dwn/sdws  10x b/l step ups           Seated hip flex/knee ext/add with ball/abd with band             CS/HS  30"x3           Mini squats  5" x 10           PT ed - HEP 15            Ther Activity             stairs                          Gait Training             TM                          Modalities

## 2021-07-22 ENCOUNTER — APPOINTMENT (OUTPATIENT)
Dept: PHYSICAL THERAPY | Facility: CLINIC | Age: 76
End: 2021-07-22
Payer: COMMERCIAL

## 2021-07-27 ENCOUNTER — OFFICE VISIT (OUTPATIENT)
Dept: PHYSICAL THERAPY | Facility: CLINIC | Age: 76
End: 2021-07-27
Payer: COMMERCIAL

## 2021-07-27 DIAGNOSIS — M25.561 CHRONIC PAIN OF RIGHT KNEE: Primary | ICD-10-CM

## 2021-07-27 DIAGNOSIS — G89.29 CHRONIC PAIN OF LEFT KNEE: ICD-10-CM

## 2021-07-27 DIAGNOSIS — M25.562 CHRONIC PAIN OF LEFT KNEE: ICD-10-CM

## 2021-07-27 DIAGNOSIS — G89.29 CHRONIC PAIN OF RIGHT KNEE: Primary | ICD-10-CM

## 2021-07-27 PROCEDURE — 97112 NEUROMUSCULAR REEDUCATION: CPT | Performed by: PHYSICAL THERAPIST

## 2021-07-27 PROCEDURE — 97110 THERAPEUTIC EXERCISES: CPT | Performed by: PHYSICAL THERAPIST

## 2021-07-27 NOTE — PROGRESS NOTES
Daily Note     Today's date: 2021  Patient name: Bebe Thomas  : 1945  MRN: [id number]  Referring provider: Byron Lawrence MD  Dx:   Encounter Diagnosis     ICD-10-CM    1  Chronic pain of right knee  M25 561     G89 29    2  Chronic pain of left knee  M25 562     G89 29        Start Time: 1415  Stop Time: 1500  Total time in clinic (min): 45 minutes    Subjective: patient reports minimal BL knee pain today      Objective: See treatment diary below      Assessment: tolerated balance exercises with verbal cueing for posture, minimal LOB; no increase in knee pain during exercise reported; tolerated sit to stand with tactile and verbal cueing for proper mechanics and muscle activation      Plan: Continue per plan of care        Precautions: HTN- takes meds, but high       Manual          MFR  RB           Patellar mobs  RB                        Total time             Neuro Re-Ed             Stance on foam    5         Stability disc    5         Foam balance beam    5         hurdles                                                    Ther Ex             BP assessed  5' 5'          Bike  8' 7' 10'         SLR/VMO SLR  15x b/l ; 10x b/l            S/l hip abduction  10x b/l            / bar 5-way             Step up/dwn/sdws  10x b/l step ups  x10         Seated hip flex/knee ext/add with ball/abd with band             CS/HS  30"x3  30" x3         Mini squats  5" x 10           PT ed - HEP 15            Ther Activity             stairs             Sit<>stand    x10         Gait Training             TM                          Modalities

## 2021-07-29 ENCOUNTER — OFFICE VISIT (OUTPATIENT)
Dept: PHYSICAL THERAPY | Facility: CLINIC | Age: 76
End: 2021-07-29
Payer: COMMERCIAL

## 2021-07-29 DIAGNOSIS — G89.29 CHRONIC PAIN OF LEFT KNEE: ICD-10-CM

## 2021-07-29 DIAGNOSIS — M25.561 CHRONIC PAIN OF RIGHT KNEE: Primary | ICD-10-CM

## 2021-07-29 DIAGNOSIS — M25.562 CHRONIC PAIN OF LEFT KNEE: ICD-10-CM

## 2021-07-29 DIAGNOSIS — G89.29 CHRONIC PAIN OF RIGHT KNEE: Primary | ICD-10-CM

## 2021-07-29 PROCEDURE — 97112 NEUROMUSCULAR REEDUCATION: CPT | Performed by: PHYSICAL THERAPIST

## 2021-07-29 PROCEDURE — 97110 THERAPEUTIC EXERCISES: CPT | Performed by: PHYSICAL THERAPIST

## 2021-07-29 NOTE — PROGRESS NOTES
Daily Note     Today's date: 2021  Patient name: Cristina Cranker  : 1945  MRN: [id number]  Referring provider: Romain Ludwig MD  Dx:   Encounter Diagnosis     ICD-10-CM    1  Chronic pain of right knee  M25 561     G89 29    2  Chronic pain of left knee  M25 562     G89 29        Start Time: 1500  Stop Time: 1545  Total time in clinic (min): 45 minutes    Subjective: patient reports minimal BL knee pain again today      Objective: See treatment diary below      Assessment: improved mechanics noted during sit<>stand, stability continues to improve, tolerated hurdles with minimal LOB      Plan: Continue per plan of care        Precautions: HTN- takes meds, but high       Manual         MFR  RB           Patellar mobs  RB                        Total time             Neuro Re-Ed             Stance on foam    5 5        Stability disc    5 5        Foam balance beam    5 5        hurdles     x6                                               Ther Ex             BP assessed  5' 5'          Bike  8' 7' 10' 10'        SLR/VMO SLR  15x b/l ; 10x b/l            S/l hip abduction  10x b/l            / bar 5-way             Step up/dwn/sdws  10x b/l step ups  x10 x10        Seated hip flex/knee ext/add with ball/abd with band             CS/HS  30"x3  30" x3 30"  x3        Mini squats  5" x 10           PT ed - HEP 15            Ther Activity             stairs             Sit<>stand    x10 x10        Gait Training             TM                          Modalities

## 2021-08-03 ENCOUNTER — OFFICE VISIT (OUTPATIENT)
Dept: PHYSICAL THERAPY | Facility: CLINIC | Age: 76
End: 2021-08-03
Payer: COMMERCIAL

## 2021-08-03 DIAGNOSIS — G89.29 CHRONIC PAIN OF RIGHT KNEE: Primary | ICD-10-CM

## 2021-08-03 DIAGNOSIS — G89.29 CHRONIC PAIN OF LEFT KNEE: ICD-10-CM

## 2021-08-03 DIAGNOSIS — M25.562 CHRONIC PAIN OF LEFT KNEE: ICD-10-CM

## 2021-08-03 DIAGNOSIS — M25.561 CHRONIC PAIN OF RIGHT KNEE: Primary | ICD-10-CM

## 2021-08-03 PROCEDURE — 97110 THERAPEUTIC EXERCISES: CPT

## 2021-08-03 PROCEDURE — 97112 NEUROMUSCULAR REEDUCATION: CPT

## 2021-08-03 NOTE — PROGRESS NOTES
Daily Note     Today's date: 8/3/2021  Patient name: Dennise Ramirez  : 1945  MRN: [id number]  Referring provider: Cassius Serrano MD  Dx:   Encounter Diagnosis     ICD-10-CM    1  Chronic pain of right knee  M25 561     G89 29    2  Chronic pain of left knee  M25 562     G89 29        Start Time: 7378  Stop Time: 1716  Total time in clinic (min): 42 minutes    Subjective: Patient reports her legs are starting to feel much better  Objective: See treatment diary below      Assessment: Tolerated treatment well  Patient demonstrated fatigue post treatment and would benefit from continued skilled PT  Patient was challenged w/ progression of reps and hip strengthening exercises  Plan: Continue per plan of care        Precautions: HTN- takes meds, but high       Manual 7/8 7/12 7/14 7/27 7/29 8/3       MFR  RB           Patellar mobs  RB                        Total time             Neuro Re-Ed             Stance on foam    5 5 5       Stability disc    5 5 5       Foam balance beam    5 5 5       hurdles     x6 6x                                              Ther Ex             BP assessed  5' 5'          Bike  8' 7' 10' 10' 10'       SLR/VMO SLR  15x b/l ; 10x b/l            S/l hip abduction  10x b/l            / bar 5-way      2x10       Step up/dwn/sdws  10x b/l step ups  x10 x10 6" 2x10       Seated hip flex/knee ext/add with ball/abd with band             CS/HS  30"x3  30" x3 30"  x3 Off step 30" 3x       Mini squats  5" x 10           PT ed - HEP 15            Ther Activity             stairs             Sit<>stand    x10 x10 2x10       Gait Training             TM                          Modalities

## 2021-08-05 ENCOUNTER — OFFICE VISIT (OUTPATIENT)
Dept: PHYSICAL THERAPY | Facility: CLINIC | Age: 76
End: 2021-08-05
Payer: COMMERCIAL

## 2021-08-05 DIAGNOSIS — G89.29 CHRONIC PAIN OF RIGHT KNEE: Primary | ICD-10-CM

## 2021-08-05 DIAGNOSIS — G89.29 CHRONIC PAIN OF LEFT KNEE: ICD-10-CM

## 2021-08-05 DIAGNOSIS — M25.561 CHRONIC PAIN OF RIGHT KNEE: Primary | ICD-10-CM

## 2021-08-05 DIAGNOSIS — M25.562 CHRONIC PAIN OF LEFT KNEE: ICD-10-CM

## 2021-08-05 PROCEDURE — 97112 NEUROMUSCULAR REEDUCATION: CPT

## 2021-08-05 PROCEDURE — 97110 THERAPEUTIC EXERCISES: CPT

## 2021-08-05 NOTE — PROGRESS NOTES
Daily Note     Today's date: 2021  Patient name: Tano Luo  : 1945  MRN: [id number]  Referring provider: Ana Beltran MD  Dx:   Encounter Diagnosis     ICD-10-CM    1  Chronic pain of right knee  M25 561     G89 29    2  Chronic pain of left knee  M25 562     G89 29        Start Time: 1630  Stop Time: 1710  Total time in clinic (min): 40 minutes    Subjective: Patient reports no new complaints upon arrival        Objective: See treatment diary below      Assessment: Tolerated treatment well  Patient demonstrated fatigue post treatment and would benefit from continued skilled PT  No LOB t/o session  Good endurance  No increased pain  Plan: Continue per plan of care        Precautions: HTN- takes meds, but high       Manual 7/8 7/12 7/14 7/27 7/29 8/3 8      MFR  RB           Patellar mobs  RB                        Total time             Neuro Re-Ed             Stance on foam    5 5 5       Stability disc    5 5 5       Foam balance beam    5 5 5 10x ea      hurdles     x6 6x                                              Ther Ex             BP assessed  5' 5'          Bike  8' 7' 10' 10' 10' 10'      SLR/VMO SLR  15x b/l ; 10x b/l            S/l hip abduction  10x b/l            / bar 5-way      2x10 20x      Step up/dwn/sdws  10x b/l step ups  x10 x10 6" 2x10 6" 2x10 fwd/side      Seated hip flex/knee ext/add with ball/abd with band             CS/HS  30"x3  30" x3 30"  x3 Off step 30" 3x Off step 30" 3x      Mini squats  5" x 10           PT ed - HEP 15            Ther Activity             stairs             Sit<>stand    x10 x10 2x10 2x10      Gait Training             TM                          Modalities             MH

## 2021-08-10 ENCOUNTER — EVALUATION (OUTPATIENT)
Dept: PHYSICAL THERAPY | Facility: CLINIC | Age: 76
End: 2021-08-10
Payer: COMMERCIAL

## 2021-08-10 DIAGNOSIS — G89.29 CHRONIC PAIN OF LEFT KNEE: ICD-10-CM

## 2021-08-10 DIAGNOSIS — G89.29 CHRONIC PAIN OF RIGHT KNEE: Primary | ICD-10-CM

## 2021-08-10 DIAGNOSIS — M25.561 CHRONIC PAIN OF RIGHT KNEE: Primary | ICD-10-CM

## 2021-08-10 DIAGNOSIS — M25.562 CHRONIC PAIN OF LEFT KNEE: ICD-10-CM

## 2021-08-10 PROCEDURE — 97112 NEUROMUSCULAR REEDUCATION: CPT | Performed by: PHYSICAL THERAPIST

## 2021-08-10 PROCEDURE — 97110 THERAPEUTIC EXERCISES: CPT | Performed by: PHYSICAL THERAPIST

## 2021-08-10 NOTE — PROGRESS NOTES
RE-CERTIFICATION     Today's date: 8/10/2021  Patient name: Irvin Muller  : 1945  MRN: [id number]  Referring provider: Levi Boone MD  Dx:   Encounter Diagnosis     ICD-10-CM    1  Chronic pain of right knee  M25 561     G89 29    2   Chronic pain of left knee  M25 562     G89 29        Start Time: 1500  Stop Time: 1545  Total time in clinic (min): 45 minutes    Subjective: patient reports minimal pain      Objective: BL LE hip flex 4+/5         Knee ext  4/5          Flexion 4+/5             Ankle Df  4+/5             Arom BL knee flex 111 deg      Assessment: increased Strength and Rom; able to ascend/descend stairs with 35% decrease in difficulty      Plan: Cont POC 2x/week - 4 weeks; STGs 75% Met; LTGs 35% Met     Precautions: HTN- takes meds, but high       Manual 7/8 7/12 7/14 7/27 7/29 8/3 8/5 8/10     MFR  RB           Patellar mobs  RB                        Total time             Neuro Re-Ed             Stance on foam    5 5 5  5     Stability disc    5 5 5  5     Foam balance beam    5 5 5 10x ea 10x ea     hurdles     x6 6x                                              Ther Ex             BP assessed  5' 5'          Bike  8' 7' 10' 10' 10' 10' 10'     SLR/VMO SLR  15x b/l ; 10x b/l            S/l hip abduction  10x b/l            / bar 5-way      2x10 20x      Step up/dwn/sdws  10x b/l step ups  x10 x10 6" 2x10 6" 2x10 fwd/side 6" 2x10     Seated hip flex/knee ext/add with ball/abd with band             CS/HS  30"x3  30" x3 30"  x3 Off step 30" 3x Off step 30" 3x 30" 3x     Mini squats  5" x 10           PT ed - HEP 15            Ther Activity             stairs             Sit<>stand    x10 x10 2x10 2x10 2x10     Gait Training             TM                          Modalities             MH

## 2021-08-12 ENCOUNTER — OFFICE VISIT (OUTPATIENT)
Dept: PHYSICAL THERAPY | Facility: CLINIC | Age: 76
End: 2021-08-12
Payer: COMMERCIAL

## 2021-08-12 DIAGNOSIS — G89.29 CHRONIC PAIN OF RIGHT KNEE: Primary | ICD-10-CM

## 2021-08-12 DIAGNOSIS — M25.562 CHRONIC PAIN OF LEFT KNEE: ICD-10-CM

## 2021-08-12 DIAGNOSIS — G89.29 CHRONIC PAIN OF LEFT KNEE: ICD-10-CM

## 2021-08-12 DIAGNOSIS — M25.561 CHRONIC PAIN OF RIGHT KNEE: Primary | ICD-10-CM

## 2021-08-12 PROCEDURE — 97112 NEUROMUSCULAR REEDUCATION: CPT

## 2021-08-12 PROCEDURE — 97110 THERAPEUTIC EXERCISES: CPT

## 2021-08-12 NOTE — PROGRESS NOTES
Daily Note     Today's date: 2021  Patient name: Dany Noonan  : 1945  MRN: [id number]  Referring provider: Bettie Rodriguez MD  Dx:   Encounter Diagnosis     ICD-10-CM    1  Chronic pain of right knee  M25 561     G89 29    2  Chronic pain of left knee  M25 562     G89 29        Start Time: 1545  Stop Time: 1630  Total time in clinic (min): 45 minutes    Subjective: Patient reports b/l knees feel a little sore today  Objective: See treatment diary below      Assessment: Tolerated treatment well  Patient demonstrated fatigue post treatment and would benefit from continued skilled PT  This session we progressed hip strengthening exercises w/ notable fatigue  Plan: Continue per plan of care        Precautions: HTN- takes meds, but high       Manual 7/8 7/12 7/14 7/27 7/29 8/3 8/5 8/10 8/12    MFR  RB           Patellar mobs  RB                        Total time             Neuro Re-Ed             Stance on foam    5 5 5  5     Stability disc    5 5 5  5     Foam balance beam    5 5 5 10x ea 10x ea     hurdles     x6 6x       SLR         3x10    Clamshells         20x    Side stepping         3 laps    Ther Ex             BP assessed  5' 5'          Bike  8' 7' 10' 10' 10' 10' 10' 10'    SLR/VMO SLR  15x b/l ; 10x b/l            S/l hip abduction  10x b/l            / bar 5-way      2x10 20x      Step up/dwn/sdws  10x b/l step ups  x10 x10 6" 2x10 6" 2x10 fwd/side 6" 2x10 6" fwd/side 2x10    Seated hip flex/knee ext/add with ball/abd with band             CS/HS  30"x3  30" x3 30"  x3 Off step 30" 3x Off step 30" 3x 30" 3x 30" 3x    Mini squats  5" x 10       2x10    PT ed - HEP 15            Ther Activity             stairs             Sit<>stand    x10 x10 2x10 2x10 2x10     Gait Training             TM                          Modalities

## 2021-08-17 ENCOUNTER — OFFICE VISIT (OUTPATIENT)
Dept: PHYSICAL THERAPY | Facility: CLINIC | Age: 76
End: 2021-08-17
Payer: COMMERCIAL

## 2021-08-17 DIAGNOSIS — G89.29 CHRONIC PAIN OF RIGHT KNEE: Primary | ICD-10-CM

## 2021-08-17 DIAGNOSIS — M25.562 CHRONIC PAIN OF LEFT KNEE: ICD-10-CM

## 2021-08-17 DIAGNOSIS — M25.561 CHRONIC PAIN OF RIGHT KNEE: Primary | ICD-10-CM

## 2021-08-17 DIAGNOSIS — G89.29 CHRONIC PAIN OF LEFT KNEE: ICD-10-CM

## 2021-08-17 PROCEDURE — 97112 NEUROMUSCULAR REEDUCATION: CPT

## 2021-08-17 PROCEDURE — 97110 THERAPEUTIC EXERCISES: CPT

## 2021-08-17 NOTE — PROGRESS NOTES
Daily Note     Today's date: 2021  Patient name: Yenifer Jorge  : 1945  MRN: [id number]  Referring provider: Shad Cabrales MD  Dx:   Encounter Diagnosis     ICD-10-CM    1  Chronic pain of right knee  M25 561     G89 29    2  Chronic pain of left knee  M25 562     G89 29                   Subjective: Pt reports stairs are still the most challenging at this time  Objective: See treatment diary below      Assessment: Added step up to foam this visit with good tolerance; no handheld support needed to maintain balance; mild sway noted and CS from therapist to ensure patient safety  Visual/Verbal cues need to avoid b/l knee valgus specifically with step ups, mini squats  Progress as able  Plan: Continue with current POC to address pt deficits        Precautions: HTN- takes meds, but high       Manual 7/8 7/12 7/14 7/27 7/29 8/3 8/5 8/10 8/12 8/17   MFR  RB           Patellar mobs  RB                        Total time             Neuro Re-Ed             Stance on foam    5 5 5  5  10'x10   Stability disc    5 5 5  5     Foam balance beam    5 5 5 10x ea 10x ea     hurdles     x6 6x       SLR         3x10 3x10   Clamshells         20x 20x   Side stepping         3 laps 3 laps    Ther Ex             BP assessed  5' 5'          Bike  8' 7' 10' 10' 10' 10' 10' 10' 10'   SLR/VMO SLR  15x b/l ; 10x b/l            S/l hip abduction  10x b/l            / bar 5-way      2x10 20x      Step up/dwn/sdws  10x b/l step ups  x10 x10 6" 2x10 6" 2x10 fwd/side 6" 2x10 6" fwd/side 2x10 6" fwd/side 2x10 ea   Seated hip flex/knee ext/add with ball/abd with band             CS/HS  30"x3  30" x3 30"  x3 Off step 30" 3x Off step 30" 3x 30" 3x 30" 3x 30"x3   Mini squats  5" x 10       2x10 2x10   PT ed - HEP 15            Ther Activity             stairs             Sit<>stand    x10 x10 2x10 2x10 2x10     Gait Training             TM                          Modalities

## 2021-08-19 ENCOUNTER — OFFICE VISIT (OUTPATIENT)
Dept: PHYSICAL THERAPY | Facility: CLINIC | Age: 76
End: 2021-08-19
Payer: COMMERCIAL

## 2021-08-19 DIAGNOSIS — M25.561 CHRONIC PAIN OF RIGHT KNEE: Primary | ICD-10-CM

## 2021-08-19 DIAGNOSIS — G89.29 CHRONIC PAIN OF RIGHT KNEE: Primary | ICD-10-CM

## 2021-08-19 DIAGNOSIS — M25.562 CHRONIC PAIN OF LEFT KNEE: ICD-10-CM

## 2021-08-19 DIAGNOSIS — G89.29 CHRONIC PAIN OF LEFT KNEE: ICD-10-CM

## 2021-08-19 PROCEDURE — 97112 NEUROMUSCULAR REEDUCATION: CPT | Performed by: PHYSICAL THERAPIST

## 2021-08-19 PROCEDURE — 97110 THERAPEUTIC EXERCISES: CPT | Performed by: PHYSICAL THERAPIST

## 2021-08-19 NOTE — PROGRESS NOTES
Daily Note     Today's date: 2021  Patient name: Valdez Jean  : 1945  MRN: [id number]  Referring provider: Stephanie Chen MD  Dx:   Encounter Diagnosis     ICD-10-CM    1  Chronic pain of right knee  M25 561     G89 29    2  Chronic pain of left knee  M25 562     G89 29        Start Time: 1415  Stop Time: 1500  Total time in clinic (min): 45 minutes    Subjective: patient reports BL knee pain 5/10 today      Objective: See treatment diary below      Assessment:  Tolerated foam balance beam with good nuno; sit <> stand mechanics improving      Plan: Continue with current POC to address pt deficits        Precautions: HTN- takes meds, but high       Manual 8/19 7/12 7/14 7/27 7/29 8/3 8/5 8/10 8/12 8/17   MFR  RB           Patellar mobs  RB                        Total time             Neuro Re-Ed             Stance on foam 5'   5 5 5  5  10'x10   Stability disc 5'   5 5 5  5     Foam balance beam Fwd/bkwd/sdw x5 ea   5 5 5 10x ea 10x ea     hurdles     x6 6x       SLR         3x10 3x10   Clamshells         20x 20x   Side stepping         3 laps 3 laps    Ther Ex             BP assessed  5' 5'          Bike 10 8' 7' 10' 10' 10' 10' 10' 10' 10'   SLR/VMO SLR  15x b/l ; 10x b/l            S/l hip abduction  10x b/l            / bar 5-way x20     2x10 20x      Step up/dwn/sdws 6" 10x b/l step ups  x10 x10 6" 2x10 6" 2x10 fwd/side 6" 2x10 6" fwd/side 2x10 6" fwd/side 2x10 ea   Seated hip flex/knee ext/add with ball/abd with band             CS/HS  30"x3  30" x3 30"  x3 Off step 30" 3x Off step 30" 3x 30" 3x 30" 3x 30"x3   Mini squats x20 5" x 10       2x10 2x10   PT ed - HEP             Ther Activity             stairs             Sit<>stand x20   x10 x10 2x10 2x10 2x10     Gait Training             TM                          Modalities

## 2021-08-24 ENCOUNTER — OFFICE VISIT (OUTPATIENT)
Dept: PHYSICAL THERAPY | Facility: CLINIC | Age: 76
End: 2021-08-24
Payer: COMMERCIAL

## 2021-08-24 DIAGNOSIS — G89.29 CHRONIC PAIN OF RIGHT KNEE: Primary | ICD-10-CM

## 2021-08-24 DIAGNOSIS — G89.29 CHRONIC PAIN OF LEFT KNEE: ICD-10-CM

## 2021-08-24 DIAGNOSIS — M25.562 CHRONIC PAIN OF LEFT KNEE: ICD-10-CM

## 2021-08-24 DIAGNOSIS — M25.561 CHRONIC PAIN OF RIGHT KNEE: Primary | ICD-10-CM

## 2021-08-24 PROCEDURE — 97110 THERAPEUTIC EXERCISES: CPT

## 2021-08-24 PROCEDURE — 97112 NEUROMUSCULAR REEDUCATION: CPT

## 2021-08-26 ENCOUNTER — OFFICE VISIT (OUTPATIENT)
Dept: PHYSICAL THERAPY | Facility: CLINIC | Age: 76
End: 2021-08-26
Payer: COMMERCIAL

## 2021-08-26 DIAGNOSIS — M25.562 CHRONIC PAIN OF LEFT KNEE: ICD-10-CM

## 2021-08-26 DIAGNOSIS — M25.561 CHRONIC PAIN OF RIGHT KNEE: Primary | ICD-10-CM

## 2021-08-26 DIAGNOSIS — G89.29 CHRONIC PAIN OF LEFT KNEE: ICD-10-CM

## 2021-08-26 DIAGNOSIS — G89.29 CHRONIC PAIN OF RIGHT KNEE: Primary | ICD-10-CM

## 2021-08-26 PROCEDURE — 97112 NEUROMUSCULAR REEDUCATION: CPT

## 2021-08-26 PROCEDURE — 97110 THERAPEUTIC EXERCISES: CPT

## 2021-08-26 NOTE — PROGRESS NOTES
Daily Note     Today's date: 2021  Patient name: Moe Vera  : 1945  MRN: [id number]  Referring provider: Riri Hawkins MD  Dx:   Encounter Diagnosis     ICD-10-CM    1  Chronic pain of right knee  M25 561     G89 29    2  Chronic pain of left knee  M25 562     G89 29                   Subjective: Pt reports more pain/stiffness in b/l LE today as she feels she was on them too much already today  Objective: See treatment diary below; patient requested that her and her daughter leave by 5:00      Assessment: Modified treatment program today secondary to increased pain and stiffness  Pt denied any increases in pain t/o visit but did need visual and tactile cues to ensure correct exercise technique  Improvement in patellar mobility post PROM  Progress as able NV  Plan: Continue with current POC to address pt deficits        Precautions: HTN- takes meds, but high       Manual     8 8/10 8/12 8/17   MFR             Patellar mobs   TB-patellar PROM                       Total time             Neuro Re-Ed             Stance on foam 5' 10"x10      5  10'x10   Stability disc 5'       5     Foam balance beam Fwd/bkwd/sdw x5 ea      10x ea 10x ea     hurdles             Bridges    2x10          SLR  3x10 ea b/l        3x10 3x10   Clamshells  2x10 ea b/l  2x10 ea b/l       20x 20x   Side stepping  3 laps YTB       3 laps 3 laps    Ther Ex             BP assessed             Bike 10 10' 10'    10' 10' 10' 10'   SLR/VMO SLR             S/l hip abduction             / bar 5-way x20      20x      Step up/dwn/sdws 6" 6" fwd/side b/l 2x10     6" 2x10 fwd/side 6" 2x10 6" fwd/side 2x10 6" fwd/side 2x10 ea   Heel slides    5" x20 ea b/l           Seated hip flex/knee ext/add with ball/abd with band             CS/HS       Off step 30" 3x 30" 3x 30" 3x 30"x3   Mini squats x20 x20       2x10 2x10   PT ed - HEP             Ther Activity             stairs             Sit<>stand x20      2x10 2x10     Gait Training             TM                          Modalities             MH

## 2021-08-31 ENCOUNTER — OFFICE VISIT (OUTPATIENT)
Dept: PHYSICAL THERAPY | Facility: CLINIC | Age: 76
End: 2021-08-31
Payer: COMMERCIAL

## 2021-08-31 DIAGNOSIS — G89.29 CHRONIC PAIN OF RIGHT KNEE: Primary | ICD-10-CM

## 2021-08-31 DIAGNOSIS — M25.562 CHRONIC PAIN OF LEFT KNEE: ICD-10-CM

## 2021-08-31 DIAGNOSIS — M25.561 CHRONIC PAIN OF RIGHT KNEE: Primary | ICD-10-CM

## 2021-08-31 DIAGNOSIS — G89.29 CHRONIC PAIN OF LEFT KNEE: ICD-10-CM

## 2021-08-31 PROCEDURE — 97110 THERAPEUTIC EXERCISES: CPT

## 2021-08-31 PROCEDURE — 97112 NEUROMUSCULAR REEDUCATION: CPT

## 2021-08-31 NOTE — PROGRESS NOTES
Daily Note     Today's date: 2021  Patient name: Gildardo Douglas  : 1945  MRN: [id number]  Referring provider: Hue Contreras MD  Dx:   Encounter Diagnosis     ICD-10-CM    1  Chronic pain of right knee  M25 561     G89 29    2  Chronic pain of left knee  M25 562     G89 29        Start Time: 1425  Stop Time: 1503  Total time in clinic (min): 38 minutes    Subjective: Patient reports her knees are feeling better compared to last session  Objective: See treatment diary below      Assessment: Tolerated treatment well  Patient demonstrated fatigue post treatment and would benefit from continued skilled PT  Progressed POC to more standing, functional activities w/ fatigue noted post session  No increased pain w/ exercises  Plan: Continue per plan of care        Precautions: HTN- takes meds, but high       Manual 8/19 8/24 8/26 8/31   8/5 8/10 8/12 8/17   MFR             Patellar mobs   TB-patellar PROM                       Total time             Neuro Re-Ed             Stance on foam 5' 10"x10      5  10'x10   Stability disc 5'       5     Foam balance beam Fwd/bkwd/sdw x5 ea      10x ea 10x ea     hurdles             Bridges    2x10          SLR  3x10 ea b/l        3x10 3x10   Clamshells  2x10 ea b/l  2x10 ea b/l       20x 20x   Side stepping  3 laps YTB  Foam balance beam 10x     3 laps 3 laps    Ther Ex             BP assessed             Bike 10 10' 10' 10'   10' 10' 10' 10'   SLR/VMO SLR             S/l hip abduction             / bar 5-way x20   20x ea   20x      Step up/dwn/sdws 6" 6" fwd/side b/l 2x10  6" 20x ea   6" 2x10 fwd/side 6" 2x10 6" fwd/side 2x10 6" fwd/side 2x10 ea   Heel slides    5" x20 ea b/l           Seated hip flex/knee ext/add with ball/abd with band             CS/HS    30" 3x   Off step 30" 3x 30" 3x 30" 3x 30"x3   Mini squats x20 x20  20x     2x10 2x10   PT ed - HEP             Ther Activity             stairs             Sit<>stand x20      2x10 2x10     Gait Training             TM                          Modalities             MH

## 2021-09-02 ENCOUNTER — OFFICE VISIT (OUTPATIENT)
Dept: PHYSICAL THERAPY | Facility: CLINIC | Age: 76
End: 2021-09-02
Payer: COMMERCIAL

## 2021-09-02 DIAGNOSIS — G89.29 CHRONIC PAIN OF LEFT KNEE: ICD-10-CM

## 2021-09-02 DIAGNOSIS — M25.561 CHRONIC PAIN OF RIGHT KNEE: Primary | ICD-10-CM

## 2021-09-02 DIAGNOSIS — M25.562 CHRONIC PAIN OF LEFT KNEE: ICD-10-CM

## 2021-09-02 DIAGNOSIS — G89.29 CHRONIC PAIN OF RIGHT KNEE: Primary | ICD-10-CM

## 2021-09-02 PROCEDURE — 97116 GAIT TRAINING THERAPY: CPT

## 2021-09-02 PROCEDURE — 97110 THERAPEUTIC EXERCISES: CPT

## 2021-09-02 PROCEDURE — 97112 NEUROMUSCULAR REEDUCATION: CPT

## 2021-09-02 NOTE — PROGRESS NOTES
Daily Note     Today's date: 2021  Patient name: Mary Berrios  : 1945  MRN: [id number]  Referring provider: Jadyn Wang MD  Dx:   Encounter Diagnosis     ICD-10-CM    1  Chronic pain of right knee  M25 561     G89 29    2  Chronic pain of left knee  M25 562     G89 29        Start Time: 5077  Stop Time: 7205  Total time in clinic (min): 40 minutes    Subjective: Patient reports her knees are feeling okay today, no new complaints  Objective: See treatment diary below      Assessment: Tolerated treatment well  Patient demonstrated fatigue post treatment and would benefit from continued skilled PT  Patient required SBA while ambulating on TM w/ extensive cueing for correct gait pattern  Plan: Continue per plan of care        Precautions: HTN- takes meds, but high       Manual 8/19 8/24 8/26 8/31 9/2  8/5 8/10 8/12 8/17   MFR             Patellar mobs   TB-patellar PROM                       Total time             Neuro Re-Ed             Stance on foam 5' 10"x10      5  10'x10   Stability disc 5'       5     Foam balance beam Fwd/bkwd/sdw x5 ea      10x ea 10x ea     hurdles             Bridges    2x10          SLR  3x10 ea b/l        3x10 3x10   Clamshells  2x10 ea b/l  2x10 ea b/l       20x 20x   Side stepping  3 laps YTB  Foam balance beam 10x YTB 3 laps    3 laps 3 laps    Ther Ex             BP assessed             Bike 10 10' 10' 10'   10' 10' 10' 10'   SLR/VMO SLR             S/l hip abduction             / bar 5-way x20   20x ea YTB 20x ea  20x      Step up/dwn/sdws 6" 6" fwd/side b/l 2x10  6" 20x ea 6" 20x ea  6" 2x10 fwd/side 6" 2x10 6" fwd/side 2x10 6" fwd/side 2x10 ea   Heel slides    5" x20 ea b/l           Seated hip flex/knee ext/add with ball/abd with band             CS/HS    30" 3x 30" 4x  Off step 30" 3x 30" 3x 30" 3x 30"x3   Mini squats x20 x20  20x     2x10 2x10   PT ed - HEP             Ther Activity             stairs             Sit<>stand x20      2x10 2x10 Gait Training             TM     5' + cueing                     Modalities             MH

## 2021-09-07 ENCOUNTER — EVALUATION (OUTPATIENT)
Dept: PHYSICAL THERAPY | Facility: CLINIC | Age: 76
End: 2021-09-07
Payer: COMMERCIAL

## 2021-09-07 DIAGNOSIS — G89.29 CHRONIC PAIN OF RIGHT KNEE: Primary | ICD-10-CM

## 2021-09-07 DIAGNOSIS — M25.561 CHRONIC PAIN OF RIGHT KNEE: Primary | ICD-10-CM

## 2021-09-07 DIAGNOSIS — G89.29 CHRONIC PAIN OF LEFT KNEE: ICD-10-CM

## 2021-09-07 DIAGNOSIS — M25.562 CHRONIC PAIN OF LEFT KNEE: ICD-10-CM

## 2021-09-07 PROCEDURE — 97110 THERAPEUTIC EXERCISES: CPT | Performed by: PHYSICAL THERAPIST

## 2021-09-07 NOTE — PROGRESS NOTES
RE-CERTIFICATION    Today's date: 2021  Patient name: Surya Conway  : 1945  MRN: [id number]  Referring provider: Patti Ash MD  Dx:   Encounter Diagnosis     ICD-10-CM    1  Chronic pain of right knee  M25 561     G89 29    2  Chronic pain of left knee  M25 562     G89 29        Start Time: 1500  Stop Time: 1545  Total time in clinic (min): 45 minutes    Subjective: Patient reports minimal knee pain, took meds       Objective: Arom BL LE knee flex 111 deg; Strength BL hip flex 4+/5; knee extension 4+/5; knee flexion 5/5; ankle Df 5/5    Assessment: increased strength, able to ascend/descend stairs with 45% decrease in difficulty      Plan: Continue per plan of care   2x/week - 4 weeks; STGs  Met; LTGs 45% Met     Precautions: HTN- takes meds, but high       Manual 8/19 8/24 8/26 8/31 9/2 9/7 8/5 8/10 8/12 8/17   MFR             Patellar mobs   TB-patellar PROM                       Total time             Neuro Re-Ed             Stance on foam 5' 10"x10      5  10'x10   Stability disc 5'       5     Foam balance beam Fwd/bkwd/sdw x5 ea      10x ea 10x ea     hurdles             Bridges    2x10          SLR  3x10 ea b/l        3x10 3x10   Clamshells  2x10 ea b/l  2x10 ea b/l       20x 20x   Side stepping  3 laps YTB  Foam balance beam 10x YTB 3 laps    3 laps 3 laps    Ther Ex             BP assessed             Bike 10 10' 10' 10'  15' 10' 10' 10' 10'   SLR/VMO SLR             S/l hip abduction             / bar 5-way x20   20x ea YTB 20x ea 20x ea 2# 20x      Step up/dwn/sdws 6" 6" fwd/side b/l 2x10  6" 20x ea 6" 20x ea 8"  20x ea 6" 2x10 fwd/side 6" 2x10 6" fwd/side 2x10 6" fwd/side 2x10 ea   Heel slides    5" x20 ea b/l           Seated hip flex/knee ext/add with ball/abd with band             CS/HS    30" 3x 30" 4x 30"  4x Off step 30" 3x 30" 3x 30" 3x 30"x3   Mini squats x20 x20  20x     2x10 2x10   PT ed - HEP             Ther Activity             stairs      5'       Sit<>stand x20 2x10 2x10     Gait Training             TM     5' + cueing 5'                    Modalities             MH

## 2021-09-07 NOTE — LETTER
2021    Royer Barker MD  [address] [employer] [address] [facility]    Patient: Valentin Aparicio   YOB: 1945   Date of Visit: 2021     Encounter Diagnosis     ICD-10-CM    1  Chronic pain of right knee  M25 561     G89 29    2  Chronic pain of left knee  M25 562     G89 29        Dear Dr Carlos Chris: Thank you for your recent referral of Valentin Aparicio  Please review the attached evaluation summary from Barb's recent visit  Please verify that you agree with the plan of care by signing the attached order  If you have any questions or concerns, please do not hesitate to call  I sincerely appreciate the opportunity to share in the care of one of your patients and hope to have another opportunity to work with you in the near future  Sincerely,    Maegan Callahan PT      Referring Provider:      I certify that I have read the below Plan of Care and certify the need for these services furnished under this plan of treatment while under my care  Royer Barker MD  [address] [employer] [address]  Via Fax: [phone]          RE-CERTIFICATION    Today's date: 2021  Patient name: Valentin Aparicio  : 1945  MRN: [id number]  Referring provider: Diego Islas MD  Dx:   Encounter Diagnosis     ICD-10-CM    1  Chronic pain of right knee  M25 561     G89 29    2  Chronic pain of left knee  M25 562     G89 29        Start Time: 1500  Stop Time: 1545  Total time in clinic (min): 45 minutes    Subjective: Patient reports minimal knee pain, took meds       Objective: Arom BL LE knee flex 111 deg; Strength BL hip flex 4+/5; knee extension 4+/5; knee flexion 5/5; ankle Df 5/5    Assessment: increased strength, able to ascend/descend stairs with 45% decrease in difficulty      Plan: Continue per plan of care   2x/week - 4 weeks; STGs  Met; LTGs 45% Met     Precautions: HTN- takes meds, but high       Manual 8/19 8/24 8/26 8/31 9/2 9/7 8/5 8/10 8/12 8/17   MFR Patellar mobs   TB-patellar PROM                       Total time             Neuro Re-Ed             Stance on foam 5' 10"x10      5  10'x10   Stability disc 5'       5     Foam balance beam Fwd/bkwd/sdw x5 ea      10x ea 10x ea     hurdles             Bridges    2x10          SLR  3x10 ea b/l        3x10 3x10   Clamshells  2x10 ea b/l  2x10 ea b/l       20x 20x   Side stepping  3 laps YTB  Foam balance beam 10x YTB 3 laps    3 laps 3 laps    Ther Ex             BP assessed             Bike 10 10' 10' 10'  15' 10' 10' 10' 10'   SLR/VMO SLR             S/l hip abduction             / bar 5-way x20   20x ea YTB 20x ea 20x ea 2# 20x      Step up/dwn/sdws 6" 6" fwd/side b/l 2x10  6" 20x ea 6" 20x ea 8"  20x ea 6" 2x10 fwd/side 6" 2x10 6" fwd/side 2x10 6" fwd/side 2x10 ea   Heel slides    5" x20 ea b/l           Seated hip flex/knee ext/add with ball/abd with band             CS/HS    30" 3x 30" 4x 30"  4x Off step 30" 3x 30" 3x 30" 3x 30"x3   Mini squats x20 x20  20x     2x10 2x10   PT ed - HEP             Ther Activity             stairs      5'       Sit<>stand x20      2x10 2x10     Gait Training             TM     5' + cueing 5'                    Modalities

## 2021-09-09 ENCOUNTER — OFFICE VISIT (OUTPATIENT)
Dept: PHYSICAL THERAPY | Facility: CLINIC | Age: 76
End: 2021-09-09
Payer: COMMERCIAL

## 2021-09-09 DIAGNOSIS — G89.29 CHRONIC PAIN OF RIGHT KNEE: Primary | ICD-10-CM

## 2021-09-09 DIAGNOSIS — G89.29 CHRONIC PAIN OF LEFT KNEE: ICD-10-CM

## 2021-09-09 DIAGNOSIS — M25.562 CHRONIC PAIN OF LEFT KNEE: ICD-10-CM

## 2021-09-09 DIAGNOSIS — M25.561 CHRONIC PAIN OF RIGHT KNEE: Primary | ICD-10-CM

## 2021-09-09 PROCEDURE — 97110 THERAPEUTIC EXERCISES: CPT

## 2021-09-09 PROCEDURE — 97112 NEUROMUSCULAR REEDUCATION: CPT

## 2021-09-09 NOTE — PROGRESS NOTES
Daily Note     Today's date: 2021  Patient name: Brittney Rodriguez  : 1945  MRN: [id number]  Referring provider: Kati Quinn MD  Dx:   Encounter Diagnosis     ICD-10-CM    1  Chronic pain of right knee  M25 561     G89 29    2  Chronic pain of left knee  M25 562     G89 29        Start Time: 1545  Stop Time: 1630  Total time in clinic (min): 45 minutes    Subjective: Patient reports her knees have been a little sore lately due to the weather  Continued difficulty w/ ascending/descending stairs  Objective: See treatment diary below      Assessment: Tolerated treatment well  Patient demonstrated fatigue post treatment and would benefit from continued skilled PT  This session we added stepping over hurdles w/ minimal LOB  Improvement in STS  Plan: Continue per plan of care        Precautions: HTN- takes meds, but high       Manual       MFR             Patellar mobs   TB-patellar PROM                       Total time             Neuro Re-Ed             Stance on foam 5' 10"x10           Stability disc 5'            Foam balance beam Fwd/bkwd/sdw x5 ea            hurdles       Fwd/side 5 laps ea      Bridges    2x10          SLR  3x10 ea b/l      3x10      Clamshells  2x10 ea b/l  2x10 ea b/l     3x10      Side stepping  3 laps YTB  Foam balance beam 10x YTB 3 laps        Ther Ex             BP assessed             Bike 10 10' 10' 10'  15' nv      SLR/VMO SLR             S/l hip abduction             / bar 5-way x20   20x ea YTB 20x ea 20x ea 2#       Step up/dwn/sdws 6" 6" fwd/side b/l 2x10  6" 20x ea 6" 20x ea 8"  20x ea 8" 20x ea      Heel slides    5" x20 ea b/l           Seated hip flex/knee ext/add with ball/abd with band             CS/HS    30" 3x 30" 4x 30"  4x 30" 4x      Mini squats x20 x20  20x         PT ed - HEP             Ther Activity             stairs      5'       Sit<>stand x20            Gait Training             TM     5' + cueing 5' Modalities             MH

## 2021-09-14 ENCOUNTER — OFFICE VISIT (OUTPATIENT)
Dept: PHYSICAL THERAPY | Facility: CLINIC | Age: 76
End: 2021-09-14
Payer: COMMERCIAL

## 2021-09-14 DIAGNOSIS — M25.562 CHRONIC PAIN OF LEFT KNEE: ICD-10-CM

## 2021-09-14 DIAGNOSIS — G89.29 CHRONIC PAIN OF RIGHT KNEE: Primary | ICD-10-CM

## 2021-09-14 DIAGNOSIS — M25.561 CHRONIC PAIN OF RIGHT KNEE: Primary | ICD-10-CM

## 2021-09-14 DIAGNOSIS — G89.29 CHRONIC PAIN OF LEFT KNEE: ICD-10-CM

## 2021-09-14 PROCEDURE — 97110 THERAPEUTIC EXERCISES: CPT

## 2021-09-14 PROCEDURE — 97112 NEUROMUSCULAR REEDUCATION: CPT

## 2021-09-14 NOTE — PROGRESS NOTES
Daily Note     Today's date: 2021  Patient name: Loretta Abarca  : 1945  MRN: [id number]  Referring provider: Neda Payan MD  Dx:   Encounter Diagnosis     ICD-10-CM    1  Chronic pain of right knee  M25 561     G89 29    2  Chronic pain of left knee  M25 562     G89 29                   Subjective: Patient reports having good and bad days in reference to b/l knees  She reports today they are feeling "okay "      Objective: See treatment diary below      Assessment: Added step downs to work on patient deficits with good tolerance; fair-good eccentric control  No LOB with filemon exercise and patient was able to perform step over step with fwd hurdles by end of repetitions  Appropriate fatigue post therapy; pt would continue to benefit from skilled physical therapy to improve functional strengthening, stability  Plan: Continue with current POC to address pt deficits        Precautions: HTN- takes meds, but high       Manual      MFR             Patellar mobs   TB-patellar PROM                       Total time             Neuro Re-Ed             Stance on foam 5' 10"x10           Stability disc 5'            Foam balance beam Fwd/bkwd/sdw x5 ea            hurdles       Fwd/side 5 laps ea Fwd/side 5 laps ea     Bridges    2x10     2x10     SLR  3x10 ea b/l      3x10 3x10 ea b/l      Clamshells  2x10 ea b/l  2x10 ea b/l     3x10 3x10 ea b/l      Side stepping  3 laps YTB  Foam balance beam 10x YTB 3 laps        Ther Ex             BP assessed             Bike 10 10' 10' 10'  15' nv 10'     SLR/VMO SLR             S/l hip abduction             / bar 5-way x20   20x ea YTB 20x ea 20x ea 2#       Step up/dwn/sdws 6" 6" fwd/side b/l 2x10  6" 20x ea 6" 20x ea 8"  20x ea 8" 20x ea 8" 2x10 ea b/l fwd, 6" 2x10 ea down     Heel slides    5" x20 ea b/l           Seated hip flex/knee ext/add with ball/abd with band             CS/HS    30" 3x 30" 4x 30"  4x 30" 4x 30"x4 Mini squats x20 x20  20x         PT ed - HEP             Ther Activity             stairs      5'       Sit<>stand x20            Gait Training             TM     5' + cueing 5'                    Modalities             MH

## 2021-09-16 ENCOUNTER — OFFICE VISIT (OUTPATIENT)
Dept: PHYSICAL THERAPY | Facility: CLINIC | Age: 76
End: 2021-09-16
Payer: COMMERCIAL

## 2021-09-16 DIAGNOSIS — G89.29 CHRONIC PAIN OF LEFT KNEE: ICD-10-CM

## 2021-09-16 DIAGNOSIS — M25.561 CHRONIC PAIN OF RIGHT KNEE: Primary | ICD-10-CM

## 2021-09-16 DIAGNOSIS — M25.562 CHRONIC PAIN OF LEFT KNEE: ICD-10-CM

## 2021-09-16 DIAGNOSIS — G89.29 CHRONIC PAIN OF RIGHT KNEE: Primary | ICD-10-CM

## 2021-09-16 PROCEDURE — 97112 NEUROMUSCULAR REEDUCATION: CPT

## 2021-09-16 PROCEDURE — 97110 THERAPEUTIC EXERCISES: CPT

## 2021-09-16 NOTE — PROGRESS NOTES
Daily Note     Today's date: 2021  Patient name: Loretta Abarca  : 1945  MRN: [id number]  Referring provider: Neda Payan MD  Dx:   Encounter Diagnosis     ICD-10-CM    1  Chronic pain of right knee  M25 561     G89 29    2  Chronic pain of left knee  M25 562     G89 29        Start Time: 9058  Stop Time: 1627  Total time in clinic (min): 34 minutes    Subjective: Pt reports she was in more pain yesterday but is feeling better today  Objective: See treatment diary below      Assessment: Tolerated treatment well  Requires UE support for filemon walking  Fair foot clearance with only one filemon hit during session  Remains challenged with current LE strengthening program  Patient demonstrated fatigue post treatment and would benefit from continued skilled PT  Plan: Continue per plan of care        Precautions: HTN- takes meds, but high       Manual     MFR             Patellar mobs   TB-patellar PROM                       Total time             Neuro Re-Ed             Stance on foam 5' 10"x10           Stability disc 5'            Foam balance beam Fwd/bkwd/sdw x5 ea            hurdles       Fwd/side 5 laps ea Fwd/side 5 laps ea Fwd / side 5 laps each    Bridges    2x10     2x10 2x10    SLR  3x10 ea b/l      3x10 3x10 ea b/l      Clamshells  2x10 ea b/l  2x10 ea b/l     3x10 3x10 ea b/l  30x b/l    Side stepping  3 laps YTB  Foam balance beam 10x YTB 3 laps        Ther Ex             BP assessed             Bike 10 10' 10' 10'  15' nv 10' 10 min    SLR/VMO SLR             S/l hip abduction             / bar 5-way x20   20x ea YTB 20x ea 20x ea 2#       Step up/dwn/sdws 6" 6" fwd/side b/l 2x10  6" 20x ea 6" 20x ea 8"  20x ea 8" 20x ea 8" 2x10 ea b/l fwd, 6" 2x10 ea down 2x10 each 8 inch fwd, 2x10 each 6 inch down    Heel slides    5" x20 ea b/l           Seated hip flex/knee ext/add with ball/abd with band             CS/HS    30" 3x 30" 4x 30"  4x 30" 4x 30"x4 30s 4x    Mini squats x20 x20  20x         PT ed - HEP             Ther Activity             stairs      5'       Sit<>stand x20            Gait Training             TM     5' + cueing 5'                    Modalities             MH

## 2021-09-20 ENCOUNTER — APPOINTMENT (OUTPATIENT)
Dept: PHYSICAL THERAPY | Facility: CLINIC | Age: 76
End: 2021-09-20
Payer: COMMERCIAL

## 2021-09-22 ENCOUNTER — APPOINTMENT (OUTPATIENT)
Dept: PHYSICAL THERAPY | Facility: CLINIC | Age: 76
End: 2021-09-22
Payer: COMMERCIAL

## 2021-09-27 ENCOUNTER — APPOINTMENT (OUTPATIENT)
Dept: PHYSICAL THERAPY | Facility: CLINIC | Age: 76
End: 2021-09-27
Payer: COMMERCIAL

## 2021-09-28 ENCOUNTER — OFFICE VISIT (OUTPATIENT)
Dept: PHYSICAL THERAPY | Facility: CLINIC | Age: 76
End: 2021-09-28
Payer: COMMERCIAL

## 2021-09-28 DIAGNOSIS — G89.29 CHRONIC PAIN OF RIGHT KNEE: Primary | ICD-10-CM

## 2021-09-28 DIAGNOSIS — M25.562 CHRONIC PAIN OF LEFT KNEE: ICD-10-CM

## 2021-09-28 DIAGNOSIS — G89.29 CHRONIC PAIN OF LEFT KNEE: ICD-10-CM

## 2021-09-28 DIAGNOSIS — M25.561 CHRONIC PAIN OF RIGHT KNEE: Primary | ICD-10-CM

## 2021-09-28 PROCEDURE — 97112 NEUROMUSCULAR REEDUCATION: CPT

## 2021-09-28 PROCEDURE — 97110 THERAPEUTIC EXERCISES: CPT

## 2021-09-28 NOTE — PROGRESS NOTES
Daily Note     Today's date: 2021  Patient name: Bebe Thomas  : 1945  MRN: [id number]  Referring provider: Byron Lawrence MD  Dx:   Encounter Diagnosis     ICD-10-CM    1  Chronic pain of right knee  M25 561     G89 29    2  Chronic pain of left knee  M25 562     G89 29        Start Time: 1500  Stop Time: 1545  Total time in clinic (min): 45 minutes    Subjective: Patient reports her knees are feeling better, not doing to bad today  Objective: See treatment diary below      Assessment: Tolerated treatment well  Patient demonstrated fatigue post treatment and would benefit from continued skilled PT  Patient was fatigued post step ups  Plan: Continue per plan of care  Precautions: HTN- takes meds, but high       Manual    MFR             Patellar mobs   TB-patellar PROM                       Total time             Neuro Re-Ed             Stance on foam 5' 10"x10        10" 10x   Stability disc 5'            Foam balance beam Fwd/bkwd/sdw x5 ea            hurdles       Fwd/side 5 laps ea Fwd/side 5 laps ea Fwd / side 5 laps each    Bridges    2x10     2x10 2x10    SLR  3x10 ea b/l      3x10 3x10 ea b/l      Clamshells  2x10 ea b/l  2x10 ea b/l     3x10 3x10 ea b/l  30x b/l    Side stepping  3 laps YTB  Foam balance beam 10x YTB 3 laps        Ther Ex             BP assessed             Bike 10 10' 10' 10'  15' nv 10' 10 min 10'   SLR/VMO SLR             Heel slides          20x   / bar 5-way x20   20x ea YTB 20x ea 20x ea 2#       Step up/dwn/sdws 6" 6" fwd/side b/l 2x10  6" 20x ea 6" 20x ea 8"  20x ea 8" 20x ea 8" 2x10 ea b/l fwd, 6" 2x10 ea down 2x10 each 8 inch fwd, 2x10 each 6 inch down 8" fwd/side 2x10;     Heel slides    5" x20 ea b/l           Seated hip flex/knee ext/add with ball/abd with band             CS/HS    30" 3x 30" 4x 30"  4x 30" 4x 30"x4 30s 4x 30" 4x   Mini squats x20 x20  20x      2x10   PT ed - HEP             Ther Activity             stairs      5'       Sit<>stand x20            Gait Training             TM     5' + cueing 5'                    Modalities             MH

## 2021-09-29 ENCOUNTER — APPOINTMENT (OUTPATIENT)
Dept: PHYSICAL THERAPY | Facility: CLINIC | Age: 76
End: 2021-09-29
Payer: COMMERCIAL

## 2021-09-30 ENCOUNTER — OFFICE VISIT (OUTPATIENT)
Dept: PHYSICAL THERAPY | Facility: CLINIC | Age: 76
End: 2021-09-30
Payer: COMMERCIAL

## 2021-09-30 DIAGNOSIS — G89.29 CHRONIC PAIN OF LEFT KNEE: ICD-10-CM

## 2021-09-30 DIAGNOSIS — M25.561 CHRONIC PAIN OF RIGHT KNEE: Primary | ICD-10-CM

## 2021-09-30 DIAGNOSIS — G89.29 CHRONIC PAIN OF RIGHT KNEE: Primary | ICD-10-CM

## 2021-09-30 DIAGNOSIS — M25.562 CHRONIC PAIN OF LEFT KNEE: ICD-10-CM

## 2021-09-30 PROCEDURE — 97110 THERAPEUTIC EXERCISES: CPT

## 2021-09-30 PROCEDURE — 97112 NEUROMUSCULAR REEDUCATION: CPT

## 2021-09-30 NOTE — PROGRESS NOTES
Daily Note     Today's date: 2021  Patient name: Irvin Muller  : 1945  MRN: [id number]  Referring provider: Levi Boone MD  Dx:   Encounter Diagnosis     ICD-10-CM    1  Chronic pain of right knee  M25 561     G89 29    2  Chronic pain of left knee  M25 562     G89 29        Start Time: 1240  Stop Time: 1316  Total time in clinic (min): 36 minutes    Subjective: Patient reports no new complaints upon arrival        Objective: See treatment diary below      Assessment: Tolerated treatment well  Patient demonstrated fatigue post treatment and would benefit from continued skilled PT  Patient became fatigued w/ table exercises  Plan: Continue per plan of care  Precautions: HTN- takes meds, but high       Manual    MFR             Patellar mobs                          Total time             Neuro Re-Ed             Stance on foam          10" 10x   Stability disc             Foam balance beam 10x side/tandem            hurdles       Fwd/side 5 laps ea Fwd/side 5 laps ea Fwd / side 5 laps each    Bridges  3x10       2x10 2x10    SLR 3x10 ea      3x10 3x10 ea b/l      Clamshells 3x10      3x10 3x10 ea b/l  30x b/l    Side stepping     YTB 3 laps        Ther Ex             BP assessed             Bike 10'     15' nv 10' 10 min 10'   SLR/VMO SLR             Heel slides          20x   / bar 5-way     YTB 20x ea 20x ea 2#       Step up/dwn/sdws     6" 20x ea 8"  20x ea 8" 20x ea 8" 2x10 ea b/l fwd, 6" 2x10 ea down 2x10 each 8 inch fwd, 2x10 each 6 inch down 8" fwd/side 2x10;     Heel slides              Seated hip flex/knee ext/add with ball/abd with band             CS/HS     30" 4x 30"  4x 30" 4x 30"x4 30s 4x 30" 4x   Mini squats          2x10   PT ed - HEP             Ther Activity             stairs      5'       Sit<>stand             Gait Training             TM     5' + cueing 5'                    Modalities

## 2021-10-07 ENCOUNTER — OFFICE VISIT (OUTPATIENT)
Dept: PHYSICAL THERAPY | Facility: CLINIC | Age: 76
End: 2021-10-07
Payer: COMMERCIAL

## 2021-10-07 DIAGNOSIS — M25.562 CHRONIC PAIN OF LEFT KNEE: ICD-10-CM

## 2021-10-07 DIAGNOSIS — G89.29 CHRONIC PAIN OF LEFT KNEE: ICD-10-CM

## 2021-10-07 DIAGNOSIS — M25.561 CHRONIC PAIN OF RIGHT KNEE: Primary | ICD-10-CM

## 2021-10-07 DIAGNOSIS — G89.29 CHRONIC PAIN OF RIGHT KNEE: Primary | ICD-10-CM

## 2021-10-07 PROCEDURE — 97110 THERAPEUTIC EXERCISES: CPT

## 2021-10-07 PROCEDURE — 97116 GAIT TRAINING THERAPY: CPT

## 2021-10-12 ENCOUNTER — EVALUATION (OUTPATIENT)
Dept: PHYSICAL THERAPY | Facility: CLINIC | Age: 76
End: 2021-10-12
Payer: COMMERCIAL

## 2021-10-12 DIAGNOSIS — G89.29 CHRONIC PAIN OF RIGHT KNEE: Primary | ICD-10-CM

## 2021-10-12 DIAGNOSIS — M25.561 CHRONIC PAIN OF RIGHT KNEE: Primary | ICD-10-CM

## 2021-10-12 DIAGNOSIS — G89.29 CHRONIC PAIN OF LEFT KNEE: ICD-10-CM

## 2021-10-12 DIAGNOSIS — M25.562 CHRONIC PAIN OF LEFT KNEE: ICD-10-CM

## 2021-10-12 PROCEDURE — 97110 THERAPEUTIC EXERCISES: CPT | Performed by: PHYSICAL THERAPIST

## 2021-10-12 PROCEDURE — 97112 NEUROMUSCULAR REEDUCATION: CPT | Performed by: PHYSICAL THERAPIST

## 2021-10-14 ENCOUNTER — APPOINTMENT (OUTPATIENT)
Dept: PHYSICAL THERAPY | Facility: CLINIC | Age: 76
End: 2021-10-14
Payer: COMMERCIAL

## 2022-05-18 ENCOUNTER — APPOINTMENT (EMERGENCY)
Dept: VASCULAR ULTRASOUND | Facility: HOSPITAL | Age: 77
DRG: 305 | End: 2022-05-18
Payer: COMMERCIAL

## 2022-05-18 ENCOUNTER — HOSPITAL ENCOUNTER (INPATIENT)
Facility: HOSPITAL | Age: 77
LOS: 2 days | Discharge: HOME | DRG: 305 | End: 2022-05-20
Attending: EMERGENCY MEDICINE | Admitting: STUDENT IN AN ORGANIZED HEALTH CARE EDUCATION/TRAINING PROGRAM
Payer: COMMERCIAL

## 2022-05-18 ENCOUNTER — APPOINTMENT (EMERGENCY)
Dept: CT IMAGING | Facility: HOSPITAL | Age: 77
DRG: 305 | End: 2022-05-18
Payer: COMMERCIAL

## 2022-05-18 DIAGNOSIS — I16.0 HYPERTENSIVE URGENCY: ICD-10-CM

## 2022-05-18 DIAGNOSIS — E87.6 HYPOKALEMIA: ICD-10-CM

## 2022-05-18 DIAGNOSIS — M79.89 LEG SWELLING: ICD-10-CM

## 2022-05-18 DIAGNOSIS — I10 ACCELERATED HYPERTENSION: Primary | ICD-10-CM

## 2022-05-18 LAB
2HR DELTA HS TROPONIN: 1 NG/L
4HR DELTA HS TROPONIN: 1 NG/L
ALBUMIN SERPL BCP-MCNC: 3.5 G/DL (ref 3.5–5)
ALP SERPL-CCNC: 75 U/L (ref 46–116)
ALT SERPL W P-5'-P-CCNC: 15 U/L (ref 12–78)
ANION GAP SERPL CALCULATED.3IONS-SCNC: 5 MMOL/L (ref 4–13)
AST SERPL W P-5'-P-CCNC: 26 U/L (ref 5–45)
ATRIAL RATE: 75 BPM
BASOPHILS # BLD AUTO: 0.03 THOUSANDS/ΜL (ref 0–0.1)
BASOPHILS NFR BLD AUTO: 1 % (ref 0–1)
BILIRUB SERPL-MCNC: 0.93 MG/DL (ref 0.2–1)
BUN SERPL-MCNC: 17 MG/DL (ref 5–25)
CALCIUM SERPL-MCNC: 9.2 MG/DL (ref 8.3–10.1)
CARDIAC TROPONIN I PNL SERPL HS: 8 NG/L
CARDIAC TROPONIN I PNL SERPL HS: 9 NG/L
CARDIAC TROPONIN I PNL SERPL HS: 9 NG/L
CHLORIDE SERPL-SCNC: 105 MMOL/L (ref 100–108)
CO2 SERPL-SCNC: 30 MMOL/L (ref 21–32)
CREAT SERPL-MCNC: 0.93 MG/DL (ref 0.6–1.3)
EOSINOPHIL # BLD AUTO: 0.06 THOUSAND/ΜL (ref 0–0.61)
EOSINOPHIL NFR BLD AUTO: 2 % (ref 0–6)
ERYTHROCYTE [DISTWIDTH] IN BLOOD BY AUTOMATED COUNT: 15.3 % (ref 11.6–15.1)
GFR SERPL CREATININE-BSD FRML MDRD: 59 ML/MIN/1.73SQ M
GLUCOSE SERPL-MCNC: 112 MG/DL (ref 65–140)
HCT VFR BLD AUTO: 43.6 % (ref 34.8–46.1)
HGB BLD-MCNC: 13.4 G/DL (ref 11.5–15.4)
IMM GRANULOCYTES # BLD AUTO: 0.01 THOUSAND/UL (ref 0–0.2)
IMM GRANULOCYTES NFR BLD AUTO: 0 % (ref 0–2)
LYMPHOCYTES # BLD AUTO: 0.87 THOUSANDS/ΜL (ref 0.6–4.47)
LYMPHOCYTES NFR BLD AUTO: 24 % (ref 14–44)
MCH RBC QN AUTO: 28.9 PG (ref 26.8–34.3)
MCHC RBC AUTO-ENTMCNC: 30.7 G/DL (ref 31.4–37.4)
MCV RBC AUTO: 94 FL (ref 82–98)
MONOCYTES # BLD AUTO: 0.3 THOUSAND/ΜL (ref 0.17–1.22)
MONOCYTES NFR BLD AUTO: 8 % (ref 4–12)
NEUTROPHILS # BLD AUTO: 2.33 THOUSANDS/ΜL (ref 1.85–7.62)
NEUTS SEG NFR BLD AUTO: 65 % (ref 43–75)
NRBC BLD AUTO-RTO: 0 /100 WBCS
P AXIS: 71 DEGREES
PLATELET # BLD AUTO: 166 THOUSANDS/UL (ref 149–390)
PMV BLD AUTO: 12.1 FL (ref 8.9–12.7)
POTASSIUM SERPL-SCNC: 3.7 MMOL/L (ref 3.5–5.3)
PR INTERVAL: 150 MS
PROT SERPL-MCNC: 7.8 G/DL (ref 6.4–8.2)
QRS AXIS: -44 DEGREES
QRSD INTERVAL: 80 MS
QT INTERVAL: 346 MS
QTC INTERVAL: 386 MS
RBC # BLD AUTO: 4.64 MILLION/UL (ref 3.81–5.12)
SODIUM SERPL-SCNC: 140 MMOL/L (ref 136–145)
T WAVE AXIS: 43 DEGREES
VENTRICULAR RATE: 75 BPM
WBC # BLD AUTO: 3.6 THOUSAND/UL (ref 4.31–10.16)

## 2022-05-18 PROCEDURE — 99223 1ST HOSP IP/OBS HIGH 75: CPT | Performed by: STUDENT IN AN ORGANIZED HEALTH CARE EDUCATION/TRAINING PROGRAM

## 2022-05-18 PROCEDURE — 85025 COMPLETE CBC W/AUTO DIFF WBC: CPT | Performed by: EMERGENCY MEDICINE

## 2022-05-18 PROCEDURE — 70450 CT HEAD/BRAIN W/O DYE: CPT

## 2022-05-18 PROCEDURE — 84484 ASSAY OF TROPONIN QUANT: CPT | Performed by: EMERGENCY MEDICINE

## 2022-05-18 PROCEDURE — 99285 EMERGENCY DEPT VISIT HI MDM: CPT

## 2022-05-18 PROCEDURE — 99285 EMERGENCY DEPT VISIT HI MDM: CPT | Performed by: EMERGENCY MEDICINE

## 2022-05-18 PROCEDURE — 93010 ELECTROCARDIOGRAM REPORT: CPT | Performed by: INTERNAL MEDICINE

## 2022-05-18 PROCEDURE — 93971 EXTREMITY STUDY: CPT

## 2022-05-18 PROCEDURE — 93971 EXTREMITY STUDY: CPT | Performed by: SURGERY

## 2022-05-18 PROCEDURE — 80053 COMPREHEN METABOLIC PANEL: CPT | Performed by: EMERGENCY MEDICINE

## 2022-05-18 PROCEDURE — 93005 ELECTROCARDIOGRAM TRACING: CPT

## 2022-05-18 PROCEDURE — 36415 COLL VENOUS BLD VENIPUNCTURE: CPT | Performed by: EMERGENCY MEDICINE

## 2022-05-18 RX ORDER — LABETALOL 100 MG/1
400 TABLET, FILM COATED ORAL EVERY 12 HOURS SCHEDULED
Status: DISCONTINUED | OUTPATIENT
Start: 2022-05-18 | End: 2022-05-20 | Stop reason: HOSPADM

## 2022-05-18 RX ORDER — LABETALOL HYDROCHLORIDE 5 MG/ML
10 INJECTION, SOLUTION INTRAVENOUS ONCE
Status: DISCONTINUED | OUTPATIENT
Start: 2022-05-18 | End: 2022-05-18

## 2022-05-18 RX ORDER — ONDANSETRON 2 MG/ML
4 INJECTION INTRAMUSCULAR; INTRAVENOUS EVERY 6 HOURS PRN
Status: DISCONTINUED | OUTPATIENT
Start: 2022-05-18 | End: 2022-05-20 | Stop reason: HOSPADM

## 2022-05-18 RX ORDER — CHLORTHALIDONE 25 MG/1
25 TABLET ORAL DAILY
Status: DISCONTINUED | OUTPATIENT
Start: 2022-05-18 | End: 2022-05-20 | Stop reason: HOSPADM

## 2022-05-18 RX ORDER — ACETAMINOPHEN 325 MG/1
650 TABLET ORAL EVERY 6 HOURS PRN
Status: DISCONTINUED | OUTPATIENT
Start: 2022-05-18 | End: 2022-05-20 | Stop reason: HOSPADM

## 2022-05-18 RX ORDER — LABETALOL HYDROCHLORIDE 5 MG/ML
10 INJECTION, SOLUTION INTRAVENOUS EVERY 6 HOURS PRN
Status: DISCONTINUED | OUTPATIENT
Start: 2022-05-18 | End: 2022-05-20 | Stop reason: HOSPADM

## 2022-05-18 RX ORDER — CHLORTHALIDONE 25 MG/1
25 TABLET ORAL DAILY
Status: DISCONTINUED | OUTPATIENT
Start: 2022-05-19 | End: 2022-05-18

## 2022-05-18 RX ORDER — HEPARIN SODIUM 5000 [USP'U]/ML
5000 INJECTION, SOLUTION INTRAVENOUS; SUBCUTANEOUS EVERY 8 HOURS SCHEDULED
Status: DISCONTINUED | OUTPATIENT
Start: 2022-05-18 | End: 2022-05-20 | Stop reason: HOSPADM

## 2022-05-18 RX ADMIN — CHLORTHALIDONE 25 MG: 25 TABLET ORAL at 18:46

## 2022-05-18 RX ADMIN — HEPARIN SODIUM 5000 UNITS: 5000 INJECTION INTRAVENOUS; SUBCUTANEOUS at 21:13

## 2022-05-18 RX ADMIN — LABETALOL HYDROCHLORIDE 400 MG: 100 TABLET, FILM COATED ORAL at 18:14

## 2022-05-18 RX ADMIN — Medication 10 MG: at 17:08

## 2022-05-18 NOTE — ED PROVIDER NOTES
History  Chief Complaint   Patient presents with    Hypertension     Pt presents with c/o HTN, reports daughter taking BP last night with a reading of 204/101  Denies CP, HA or any other symptoms  HPI  63 yo F presents with high blood pressure, last night was 204/101  She states that she has some mild pressure behind her eyes  Denies visual changes, dizziness, weakness, chest pain or shortness of breath  Reports swelling in back of right leg for which she was prescribed a steroid cream without improvement  She states that she is not sure what she takes for her BP  Prior to Admission Medications   Prescriptions Last Dose Informant Patient Reported? Taking?    HYDROcodone-acetaminophen (NORCO) 5-325 mg per tablet   No No   Sig: Take 1 tablet by mouth every 6 (six) hours as needed (severe pain, not otherwise controlled)Max Daily Amount: 4 tablets   aspirin 81 mg chewable tablet   Yes No   Sig: Chew 81 mg daily   chlorthalidone 25 mg tablet   No No   Sig: Take 1 tablet by mouth daily   fluticasone (FLONASE) 50 mcg/act nasal spray   No No   Si spray into each nostril daily   labetalol (NORMODYNE) 200 mg tablet   No No   Sig: Take 2 tablets by mouth every 12 (twelve) hours   magnesium oxide (MAG-OX) 400 mg   No No   Sig: Take 1 tablet by mouth daily for 5 days   meclizine (ANTIVERT) 25 mg tablet   No No   Sig: Take 1 tablet by mouth every 8 (eight) hours as needed for dizziness   omeprazole (PriLOSEC) 20 mg delayed release capsule   Yes No   Sig: Take 20 mg by mouth daily   ondansetron (ZOFRAN-ODT) 4 mg disintegrating tablet   No No   Sig: Take 1 tablet (4 mg total) by mouth every 6 (six) hours as needed for nausea or vomiting   potassium chloride (K-DUR,KLOR-CON) 20 mEq tablet   No No   Sig: Take 2 tablets by mouth 2 (two) times a day      Facility-Administered Medications: None       Past Medical History:   Diagnosis Date    Hypertension        Past Surgical History:   Procedure Laterality Date    BREAST CYST EXCISION         Family History   Family history unknown: Yes     I have reviewed and agree with the history as documented  E-Cigarette/Vaping     E-Cigarette/Vaping Substances     Social History     Tobacco Use    Smoking status: Never Smoker    Smokeless tobacco: Never Used   Substance Use Topics    Alcohol use: Yes     Comment: Socially    Drug use: No       Review of Systems   Constitutional: Negative for chills and fever  HENT: Negative for dental problem and ear pain  Eyes: Negative for pain and redness  Respiratory: Negative for cough and shortness of breath  Cardiovascular: Negative for chest pain and palpitations  Gastrointestinal: Negative for abdominal pain and nausea  Endocrine: Negative for polydipsia and polyphagia  Genitourinary: Negative for dysuria and frequency  Musculoskeletal: Negative for arthralgias and joint swelling         +leg swelling   Skin: Negative for color change and rash  Neurological: Negative for dizziness and headaches  Psychiatric/Behavioral: Negative for behavioral problems and confusion  All other systems reviewed and are negative  Physical Exam  Physical Exam  Vitals and nursing note reviewed  Constitutional:       General: She is not in acute distress  Appearance: She is well-developed  She is not diaphoretic  HENT:      Head: Atraumatic  Right Ear: External ear normal       Left Ear: External ear normal       Nose: Nose normal    Eyes:      Conjunctiva/sclera: Conjunctivae normal       Pupils: Pupils are equal, round, and reactive to light  Neck:      Vascular: No JVD  Cardiovascular:      Rate and Rhythm: Normal rate and regular rhythm  Heart sounds: Normal heart sounds  No murmur heard  Pulmonary:      Effort: Pulmonary effort is normal  No respiratory distress  Breath sounds: Normal breath sounds  No wheezing  Abdominal:      General: Bowel sounds are normal  There is no distension        Palpations: Abdomen is soft  Tenderness: There is no abdominal tenderness  Musculoskeletal:         General: Normal range of motion  Cervical back: Normal range of motion and neck supple  Comments: +RLE area of swelling and tenderness posteriorly   Skin:     General: Skin is warm and dry  Capillary Refill: Capillary refill takes less than 2 seconds  Neurological:      Mental Status: She is alert and oriented to person, place, and time  Cranial Nerves: No cranial nerve deficit  Sensory: No sensory deficit  Motor: No weakness  Coordination: Coordination normal       Gait: Gait normal    Psychiatric:         Behavior: Behavior normal          Vital Signs  ED Triage Vitals   Temperature Pulse Respirations Blood Pressure SpO2   05/18/22 1304 05/18/22 1304 05/18/22 1304 05/18/22 1304 05/18/22 1304   98 1 °F (36 7 °C) 81 20 (!) 237/98 97 %      Temp Source Heart Rate Source Patient Position - Orthostatic VS BP Location FiO2 (%)   05/18/22 1304 05/18/22 1304 05/18/22 1304 05/18/22 1304 --   Oral Monitor Sitting Left arm       Pain Score       05/18/22 1445       1           Vitals:    05/18/22 1445 05/18/22 1450 05/18/22 1638 05/18/22 1641   BP: 167/86 167/86 (!) 239/113 (!) 206/96   Pulse: 62  74 71   Patient Position - Orthostatic VS:    Lying         Visual Acuity      ED Medications  Medications   labetalol (NORMODYNE) injection 10 mg (0 mg Intravenous Hold 5/18/22 1422)   labetalol (NORMODYNE) injection 10 mg (has no administration in time range)       Diagnostic Studies  Results Reviewed     Procedure Component Value Units Date/Time    HS Troponin I 2hr [[id number]] Collected: 05/18/22 1650    Lab Status:  In process Specimen: Blood from Arm, Left Updated: 05/18/22 1652    HS Troponin I 4hr [[id number]]     Lab Status: No result Specimen: Blood     HS Troponin 0hr (reflex protocol) [[id number]]  (Normal) Collected: 05/18/22 1407    Lab Status: Final result Specimen: Blood from Arm, Right Updated: 05/18/22 1437     hs TnI 0hr 8 ng/L     Comprehensive metabolic panel [[id number]] Collected: 05/18/22 1407    Lab Status: Final result Specimen: Blood from Arm, Right Updated: 05/18/22 1428     Sodium 140 mmol/L      Potassium 3 7 mmol/L      Chloride 105 mmol/L      CO2 30 mmol/L      ANION GAP 5 mmol/L      BUN 17 mg/dL      Creatinine 0 93 mg/dL      Glucose 112 mg/dL      Calcium 9 2 mg/dL      AST 26 U/L      ALT 15 U/L      Alkaline Phosphatase 75 U/L      Total Protein 7 8 g/dL      Albumin 3 5 g/dL      Total Bilirubin 0 93 mg/dL      eGFR 59 ml/min/1 73sq m     Narrative:      National Kid[employer] guidelines for Chronic Kidney Disease (CKD):     Stage 1 with normal or high GFR (GFR > 90 mL/min/1 73 square meters)    Stage 2 Mild CKD (GFR = 60-89 mL/min/1 73 square meters)    Stage 3A Moderate CKD (GFR = 45-59 mL/min/1 73 square meters)    Stage 3B Moderate CKD (GFR = 30-44 mL/min/1 73 square meters)    Stage 4 Severe CKD (GFR = 15-29 mL/min/1 73 square meters)    Stage 5 End Stage CKD (GFR <15 mL/min/1 73 square meters)  Note: GFR calculation is accurate only with a steady state creatinine    CBC and differential [[id number]]  (Abnormal) Collected: 05/18/22 1407    Lab Status: Final result Specimen: Blood from Arm, Right Updated: 05/18/22 1414     WBC 3 60 Thousand/uL      RBC 4 64 Million/uL      Hemoglobin 13 4 g/dL      Hematocrit 43 6 %      MCV 94 fL      MCH 28 9 pg      MCHC 30 7 g/dL      RDW 15 3 %      MPV 12 1 fL      Platelets 589 Thousands/uL      nRBC 0 /100 WBCs      Neutrophils Relative 65 %      Immat GRANS % 0 %      Lymphocytes Relative 24 %      Monocytes Relative 8 %      Eosinophils Relative 2 %      Basophils Relative 1 %      Neutrophils Absolute 2 33 Thousands/µL      Immature Grans Absolute 0 01 Thousand/uL      Lymphocytes Absolute 0 87 Thousands/µL      Monocytes Absolute 0 30 Thousand/µL      Eosinophils Absolute 0 06 Thousand/µL      Basophils Absolute 0 03 Thousands/µL                  VAS lower limb venous duplex study, unilateral/limited   Final Result by Too Greene MD (05/18 1629)      CT head without contrast   Final Result by Nicola Cronin MD (05/18 1443)      No acute intracranial hemorrhage noted with periventricular chronic small vessel ischemic changes and other findings detailed above  Workstation performed: [id number]                    Procedures  ECG 12 Lead Documentation Only    Date/Time: 5/18/2022 3:44 PM  Performed by: Delon Cheung MD  Authorized by: Delon Cheung MD     Comments:      NSR rate of 75 LAD normal intervals no acute ST elevations or depressions             ED Course                               SBIRT 22yo+    Flowsheet Row Most Recent Value   SBIRT (25 yo +)    In order to provide better care to our patients, we are screening all of our patients for alcohol and drug use  Would it be okay to ask you these screening questions? Yes Filed at: 05/18/2022 1419   Initial Alcohol Screen: US AUDIT-C     1  How often do you have a drink containing alcohol? 0 Filed at: 05/18/2022 1419   2  How many drinks containing alcohol do you have on a typical day you are drinking? 0 Filed at: 05/18/2022 1419   3a  Male UNDER 65: How often do you have five or more drinks on one occasion? 0 Filed at: 05/18/2022 1419   3b  FEMALE Any Age, or MALE 65+: How often do you have 4 or more drinks on one occassion? 0 Filed at: 05/18/2022 1419   Audit-C Score 0 Filed at: 05/18/2022 1419   CECILY: How many times in the past year have you    Used an illegal drug or used a prescription medication for non-medical reasons? Never Filed at: 05/18/2022 1419                    MDM  Patient presents with elevated blood pressure at home with pressure behind her eyes  Also complaining of right leg swelling, DVT study negative  She is not sure what medication she takes for her blood pressure    I verified with her pharmacy, patient is on Toprol XL 50 mg once daily in addition to losartan 100 mg daily  CT head is negative  EKG no ischemic changes, initial troponin 8  Blood pressures are labile in the ED, I discussed with Dr Hawa Jeffries of Cardiology, recommends admission for further management  Disposition  Final diagnoses:   Accelerated hypertension   Leg swelling     Time reflects when diagnosis was documented in both MDM as applicable and the Disposition within this note     Time User Action Codes Description Comment    5/18/2022  4:55 PM Cherre Day Add [I10] Accelerated hypertension     5/18/2022  4:55 PM Cherre Day Add [M79 89] Leg swelling       ED Disposition     ED Disposition   Admit    Condition   Stable    Date/Time   Wed May 18, 2022  4:56 PM    Comment   Case was discussed with Dr Brittani Ndiaye and the patient's admission status was agreed to be Admission Status: inpatient status to the service of Dr Brittani Ndiaye   Follow-up Information    None         Patient's Medications   Discharge Prescriptions    No medications on file       No discharge procedures on file      PDMP Review     None          ED Provider  Electronically Signed by           Ramonita Perez MD  05/18/22 9380

## 2022-05-18 NOTE — H&P
[address] 1945, 64 y o  female MRN: [id number]  Unit/Bed#: -01 Encounter: [id number]  Primary Care Provider: Remington Luo MD   Date and time admitted to hospital: 5/18/2022  1:28 PM    * Hypertensive crisis  Assessment & Plan  · Hypertensive urgency on arrival with BP >231 systolic  · Reports blurred vision and other non specific, non focal neuro symptoms that resolved with improvement in blood pressure  · Reports compliance with home meds, no similar episode within the last several years  · Currently reports blurred vision waxes/wanes  · Continue home meds, add PRN labetalol, check MRI brain to rule out CVA  · CT head negative for acute pathology    VTE Pharmacologic Prophylaxis: VTE Score: 4 Moderate Risk (Score 3-4) - Pharmacological DVT Prophylaxis Ordered: heparin  Code Status: Level 1 - Full Code   Discussion with family: Patient declined call to   Anticipated Length of Stay: Patient will be admitted on an inpatient basis with an anticipated length of stay of greater than 2 midnights secondary to hypertensive urgency  Total Time for Visit, including Counseling / Coordination of Care: 30 minutes Greater than 50% of this total time spent on direct patient counseling and coordination of care  Chief Complaint: hypertensive urgency     History of Present Illness:  Angeles Patel is a 64 y o  female with a PMH of hypertension who presents with blurred vision, dizziness, weakness first acutely noted this morning  Patient reports acute onset of symptoms which prompted her to check her blood pressure  Her blood pressure was >405 systolic and she came to the ED for further evaluation  In the ED, she was noted to have persistent blurred vision and dizziness but resolved with blood pressure improvement  She was admitted to medicine for further management        Review of Systems:  Review of Systems   Constitutional: Negative for chills and fever  HENT: Negative for ear pain and sore throat  Eyes: Positive for visual disturbance  Negative for pain  Respiratory: Negative for cough and shortness of breath  Cardiovascular: Negative for chest pain and palpitations  Gastrointestinal: Negative for abdominal pain and vomiting  Genitourinary: Negative for dysuria and hematuria  Musculoskeletal: Negative for arthralgias and back pain  Skin: Negative for color change and rash  Neurological: Positive for dizziness, weakness, light-headedness and numbness  Negative for seizures and syncope  All other systems reviewed and are negative  Past Medical and Surgical History:   Past Medical History:   Diagnosis Date    Hypertension        Past Surgical History:   Procedure Laterality Date    BREAST CYST EXCISION         Meds/Allergies:  Prior to Admission medications    Medication Sig Start Date End Date Taking?  Authorizing Provider   aspirin 81 mg chewable tablet Chew 81 mg daily 9/20/12   Historical Provider, MD   chlorthalidone 25 mg tablet Take 1 tablet by mouth daily 8/5/17   Joshua Lopez MD   fluticasone [facility]) 50 mcg/act nasal spray 1 spray into each nostril daily 8/25/17   Gabe Drew MD   HYDROcodone-acetaminophen [facility]) 5-325 mg per tablet Take 1 tablet by mouth every 6 (six) hours as needed (severe pain, not otherwise controlled)Max Daily Amount: 4 tablets 6/16/20   Leticia Thompson MD   labetalol (NORMODYNE) 200 mg tablet Take 2 tablets by mouth every 12 (twelve) hours 8/25/17   Gabe Drew MD   magnesium oxide (MAG-OX) 400 mg Take 1 tablet by mouth daily for 5 days 8/5/17 8/18/17  Joshua Lopez MD   meclizine (ANTIVERT) 25 mg tablet Take 1 tablet by mouth every 8 (eight) hours as needed for dizziness 8/25/17   Gabe Drew MD   omeprazole (PriLOSEC) 20 mg delayed release capsule Take 20 mg by mouth daily 4/10/17   Historical Provider, MD   ondansetron (ZOFRAN-ODT) 4 mg disintegrating tablet Take 1 tablet (4 mg total) by mouth every 6 (six) hours as needed for nausea or vomiting 6/16/20   Leticia Thompson MD   potassium chloride (K-DUR,KLOR-CON) 20 mEq tablet Take 2 tablets by mouth 2 (two) times a day 8/25/17   Gabe Drew MD     I have reviewed home medications with patient personally  Allergies: No Known Allergies    Social History:  Marital Status: Single   Occupation: na  Patient Pre-hospital Living Situation: Home  Patient Pre-hospital Level of Mobility: unable to be assessed at time of evaluation  Patient Pre-hospital Diet Restrictions: na  Substance Use History:   Social History     Substance and Sexual Activity   Alcohol Use Yes    Comment: Socially     Social History     Tobacco Use   Smoking Status Never Smoker   Smokeless Tobacco Never Used     Social History     Substance and Sexual Activity   Drug Use No       Family History:  Family History   Family history unknown: Yes       Physical Exam:     Vitals:   Blood Pressure: (!) 199/99 (05/18/22 1757)  Pulse: 75 (05/18/22 1757)  Temperature: 98 1 °F (36 7 °C) (05/18/22 1757)  Temp Source: Oral (05/18/22 1304)  Respirations: 19 (05/18/22 1757)  Height: 5' 6" (167 6 cm) (05/18/22 1304)  Weight - Scale: 83 5 kg (184 lb 1 4 oz) (05/18/22 1304)  SpO2: 97 % (05/18/22 1757)    Physical Exam  Vitals and nursing note reviewed  Constitutional:       Appearance: Normal appearance  HENT:      Head: Normocephalic and atraumatic  Right Ear: External ear normal       Left Ear: External ear normal       Nose: No congestion or rhinorrhea  Mouth/Throat:      Mouth: Mucous membranes are dry  Pharynx: Oropharynx is clear  Eyes:      General:         Right eye: No discharge  Left eye: No discharge  Cardiovascular:      Rate and Rhythm: Normal rate and regular rhythm  Pulmonary:      Effort: Pulmonary effort is normal  No respiratory distress  Abdominal:      General: Abdomen is flat        Palpations: Abdomen is soft  Musculoskeletal:      Cervical back: Normal range of motion and neck supple  Right lower leg: No edema  Left lower leg: No edema  Skin:     General: Skin is warm and dry  Neurological:      General: No focal deficit present  Mental Status: She is alert  Mental status is at baseline  Psychiatric:         Mood and Affect: Mood normal          Behavior: Behavior normal           Additional Data:     Lab Results:  Results from last 7 days   Lab Units 05/18/22  1407   WBC Thousand/uL 3 60*   HEMOGLOBIN g/dL 13 4   HEMATOCRIT % 43 6   PLATELETS Thousands/uL 166   NEUTROS PCT % 65   LYMPHS PCT % 24   MONOS PCT % 8   EOS PCT % 2     Results from last 7 days   Lab Units 05/18/22  1407   SODIUM mmol/L 140   POTASSIUM mmol/L 3 7   CHLORIDE mmol/L 105   CO2 mmol/L 30   BUN mg/dL 17   CREATININE mg/dL 0 93   ANION GAP mmol/L 5   CALCIUM mg/dL 9 2   ALBUMIN g/dL 3 5   TOTAL BILIRUBIN mg/dL 0 93   ALK PHOS U/L 75   ALT U/L 15   AST U/L 26   GLUCOSE RANDOM mg/dL 112                       Imaging: Reviewed radiology reports from this admission including: CT head  VAS lower limb venous duplex study, unilateral/limited   Final Result by Jeanmarie Negron MD (05/18 1629)      CT head without contrast   Final Result by Marisol Carter MD (05/18 7089)      No acute intracranial hemorrhage noted with periventricular chronic small vessel ischemic changes and other findings detailed above  Workstation performed: [id number]             EKG and Other Studies Reviewed on Admission:   · EKG: No EKG obtained  ** Please Note: This note has been constructed using a voice recognition system   **

## 2022-05-18 NOTE — PLAN OF CARE
Problem: CARDIOVASCULAR - ADULT  Goal: Maintains optimal cardiac output and hemodynamic stability  Description: INTERVENTIONS:  - Monitor I/O, vital signs and rhythm  - Monitor for S/S and trends of decreased cardiac output  - Administer and titrate ordered vasoactive medications to optimize hemodynamic stability  - Assess quality of pulses, skin color and temperature  - Assess for signs of decreased coronary artery perfusion  - Instruct patient to report change in severity of symptoms  Outcome: Not Progressing  Note: Hypertensive but denies any headaches or visual changes

## 2022-05-18 NOTE — ED NOTES
Assumed care now pt awake and alert US to the legs in progress at the bedside     Verona Garcia RN  05/18/22 3032

## 2022-05-18 NOTE — ASSESSMENT & PLAN NOTE
· Hypertensive urgency on arrival with BP >049 systolic  · Reports blurred vision and other non specific, non focal neuro symptoms that resolved with improvement in blood pressure  · Reports compliance with home meds, no similar episode within the last several years  · Currently reports blurred vision waxes/wanes  · Continue home meds, add PRN labetalol, check MRI brain to rule out CVA  · CT head negative for acute pathology

## 2022-05-19 ENCOUNTER — APPOINTMENT (INPATIENT)
Dept: MRI IMAGING | Facility: HOSPITAL | Age: 77
DRG: 305 | End: 2022-05-19
Payer: COMMERCIAL

## 2022-05-19 PROBLEM — I16.9 HYPERTENSIVE CRISIS: Status: RESOLVED | Noted: 2017-08-02 | Resolved: 2022-05-19

## 2022-05-19 LAB
ANION GAP SERPL CALCULATED.3IONS-SCNC: 9 MMOL/L (ref 4–13)
BUN SERPL-MCNC: 23 MG/DL (ref 5–25)
CALCIUM SERPL-MCNC: 9.1 MG/DL (ref 8.3–10.1)
CHLORIDE SERPL-SCNC: 104 MMOL/L (ref 100–108)
CO2 SERPL-SCNC: 26 MMOL/L (ref 21–32)
CREAT SERPL-MCNC: 0.98 MG/DL (ref 0.6–1.3)
ERYTHROCYTE [DISTWIDTH] IN BLOOD BY AUTOMATED COUNT: 15.3 % (ref 11.6–15.1)
GFR SERPL CREATININE-BSD FRML MDRD: 55 ML/MIN/1.73SQ M
GLUCOSE SERPL-MCNC: 95 MG/DL (ref 65–140)
HCT VFR BLD AUTO: 38.2 % (ref 34.8–46.1)
HGB BLD-MCNC: 12 G/DL (ref 11.5–15.4)
MAGNESIUM SERPL-MCNC: 1.7 MG/DL (ref 1.6–2.6)
MCH RBC QN AUTO: 28.9 PG (ref 26.8–34.3)
MCHC RBC AUTO-ENTMCNC: 31.4 G/DL (ref 31.4–37.4)
MCV RBC AUTO: 92 FL (ref 82–98)
PLATELET # BLD AUTO: 147 THOUSANDS/UL (ref 149–390)
PMV BLD AUTO: 11.3 FL (ref 8.9–12.7)
POTASSIUM SERPL-SCNC: 3.2 MMOL/L (ref 3.5–5.3)
RBC # BLD AUTO: 4.15 MILLION/UL (ref 3.81–5.12)
SODIUM SERPL-SCNC: 139 MMOL/L (ref 136–145)
WBC # BLD AUTO: 2.74 THOUSAND/UL (ref 4.31–10.16)

## 2022-05-19 PROCEDURE — 97161 PT EVAL LOW COMPLEX 20 MIN: CPT

## 2022-05-19 PROCEDURE — 97165 OT EVAL LOW COMPLEX 30 MIN: CPT

## 2022-05-19 PROCEDURE — 70551 MRI BRAIN STEM W/O DYE: CPT

## 2022-05-19 PROCEDURE — 85027 COMPLETE CBC AUTOMATED: CPT | Performed by: STUDENT IN AN ORGANIZED HEALTH CARE EDUCATION/TRAINING PROGRAM

## 2022-05-19 PROCEDURE — 83735 ASSAY OF MAGNESIUM: CPT | Performed by: STUDENT IN AN ORGANIZED HEALTH CARE EDUCATION/TRAINING PROGRAM

## 2022-05-19 PROCEDURE — 80048 BASIC METABOLIC PNL TOTAL CA: CPT | Performed by: STUDENT IN AN ORGANIZED HEALTH CARE EDUCATION/TRAINING PROGRAM

## 2022-05-19 PROCEDURE — G1004 CDSM NDSC: HCPCS

## 2022-05-19 PROCEDURE — 99233 SBSQ HOSP IP/OBS HIGH 50: CPT | Performed by: FAMILY MEDICINE

## 2022-05-19 RX ORDER — POTASSIUM CHLORIDE 14.9 MG/ML
20 INJECTION INTRAVENOUS ONCE
Status: COMPLETED | OUTPATIENT
Start: 2022-05-19 | End: 2022-05-20

## 2022-05-19 RX ORDER — POTASSIUM CHLORIDE 20 MEQ/1
40 TABLET, EXTENDED RELEASE ORAL ONCE
Status: COMPLETED | OUTPATIENT
Start: 2022-05-19 | End: 2022-05-19

## 2022-05-19 RX ORDER — AMLODIPINE BESYLATE 5 MG/1
5 TABLET ORAL DAILY
Status: DISCONTINUED | OUTPATIENT
Start: 2022-05-19 | End: 2022-05-20 | Stop reason: HOSPADM

## 2022-05-19 RX ADMIN — LABETALOL HYDROCHLORIDE 400 MG: 100 TABLET, FILM COATED ORAL at 22:41

## 2022-05-19 RX ADMIN — POTASSIUM CHLORIDE 20 MEQ: 200 INJECTION, SOLUTION INTRAVENOUS at 10:10

## 2022-05-19 RX ADMIN — CHLORTHALIDONE 25 MG: 25 TABLET ORAL at 09:48

## 2022-05-19 RX ADMIN — HEPARIN SODIUM 5000 UNITS: 5000 INJECTION INTRAVENOUS; SUBCUTANEOUS at 22:41

## 2022-05-19 RX ADMIN — LABETALOL HYDROCHLORIDE 400 MG: 100 TABLET, FILM COATED ORAL at 09:49

## 2022-05-19 RX ADMIN — AMLODIPINE BESYLATE 5 MG: 5 TABLET ORAL at 10:09

## 2022-05-19 RX ADMIN — POTASSIUM CHLORIDE 40 MEQ: 20 TABLET, EXTENDED RELEASE ORAL at 10:10

## 2022-05-19 RX ADMIN — HEPARIN SODIUM 5000 UNITS: 5000 INJECTION INTRAVENOUS; SUBCUTANEOUS at 05:41

## 2022-05-19 RX ADMIN — HEPARIN SODIUM 5000 UNITS: 5000 INJECTION INTRAVENOUS; SUBCUTANEOUS at 15:48

## 2022-05-19 NOTE — UTILIZATION REVIEW
Initial Clinical Review    Admission: Date/Time/Statement:   Admission Orders (From admission, onward)     Ordered        05/18/22 1656  [address] [facility] [place]  Once                      Orders Placed This Encounter   Procedures    INPATIENT ADMISSION     Standing Status:   Standing     Number of Occurrences:   1     Order Specific Question:   Level of Care     Answer:   Med Surg [16]     Order Specific Question:   Estimated length of stay     Answer:   More than 2 Midnights     Order Specific Question:   Certification     Answer:   I certify that inpatient services are medically necessary for this patient for a duration of greater than two midnights  See H&P and MD Progress Notes for additional information about the patient's course of treatment  ED Arrival Information     Expected   -    Arrival   5/18/2022 13:00    Acuity   Emergent            Means of arrival   Walk-In    Escorted by   Self    Service   Hospitalist    Admission type   Emergency            Arrival complaint   HBP           Chief Complaint   Patient presents with    Hypertension     Pt presents with c/o HTN, reports daughter taking BP last night with a reading of 204/101  Denies CP, HA or any other symptoms  Initial Presentation: 64 y o  female W/PMHX: HtN c/w home med regimen, to ED from Home, admitted as Inpatient, due to Hypertensive crisis  Presented with hypertensive urgency sbp>200, blurred vision with other non specific focal neuro symptoms that resolved with improvement of B/P  Reports compliance with home meds  Exam:Hypertensive, MM dry, AAxO x 3, No focal neurological deficit  ED work up reveals:  [facility]  Neg for acute pathology  Plan: trend serial B/P's prn IV labetalol, MRI brain pending, dvt ppx, Trend serial labs with repletion as needed, trend neuro checks   OT/PT      Date: 5/19/22   Day 2: Hypertensive urgency: c/w labetalol 40mg po BID, Chlorthalidone 25 mg PO OD, MRI pending, no neuro deficits, hypokalemia repletion ordered, trend serial labs with repletion as needed  The patient will continue to require additional inpatient hospital stay due to Need for blood pressure control and MRI is pending  B/P 170/88, prn iv labetalol, CNS- CN II- XII grossly intact, No acute focal neurologic deficit noted  DVT ppx, trend serial B/P and treat prn as needed   GCS 15    Intake/Output Summary (Last 24 hours) at 5/19/2022 1439  Last data filed at 5/19/2022 1249      Gross per 24 hour   Intake 720 ml   Output 900 ml   Net -180 ml        ED Triage Vitals   Temperature Pulse Respirations Blood Pressure SpO2   05/18/22 1304 05/18/22 1304 05/18/22 1304 05/18/22 1304 05/18/22 1304   98 1 °F (36 7 °C) 81 20 (!) 237/98 97 %      Temp Source Heart Rate Source Patient Position - Orthostatic VS BP Location FiO2 (%)   05/18/22 1304 05/18/22 1304 05/18/22 1304 05/18/22 1304 --   Oral Monitor Sitting Left arm       Pain Score       05/18/22 1445       1          Wt Readings from Last 1 Encounters:   05/19/22 74 3 kg (163 lb 12 8 oz)     Additional Vital Signs:    Temp Pulse Resp BP MAP (mmHg) SpO2 O2 Device Patient Position - Orthostatic VS   05/19/22 19:05:54 98 4 °F (36 9 °C) 71 18 125/67 86 97 % -- --   05/19/22 15:06:17 98 1 °F (36 7 °C) 73 18 124/69 87 98 % -- --   05/19/22 07:38:24 97 8 °F (36 6 °C) 63 20 170/88 115 96 % -- --   05/18/22 22:08:51 98 6 °F (37 °C) 71 18 126/68 87 95 % None (Room air) Lying   05/18/22 19:47:33 -- 74 -- 145/72 96 97 % -- --   05/18/22 17:57:13 98 1 °F (36 7 °C) 75 19 199/99 Abnormal  132 97 % None (Room air) Lying   05/18/22 17:55:04 -- 73 -- 199/99 Abnormal  132 98 % -- --   05/18/22 1641 -- 71 22 206/96 Abnormal  162 99 % None (Room air) Lying   05/18/22 1638 -- 74 28 Abnormal  239/113 Abnormal  162 -- -- --   05/18/22 1450 -- -- -- 167/86 -- -- -- --   05/18/22 1445 -- 62 18 167/86 119 -- None (Room air) --   05/18/22 1411 -- 66 18 180/90 Abnormal  128 98 % -- --     Date and Time R Pupil Size (mm) L Pupil Size (mm) R Pupil Reaction L Pupil Reaction   05/19/22 1800 3 3 Brisk Brisk   05/19/22 1400 3 3 Brisk Brisk   05/19/22 1000 3 3 Brisk Brisk   05/18/22 2000 3 3 Brisk Brisk   05/18/22 1800 3 3 Brisk Brisk         Pertinent Labs/Diagnostic Test Results:   5/18/22 EKG: Normal sinus rhythm  Left axis deviation  Nonspecific T wave abnormality  Abnormal ECG  VAS lower limb venous duplex study, unilateral/limited   Final Result by Emil Jhaveri MD (05/18 1629)      CT head without contrast   Final Result by Bari Jiménez MD (05/18 1443)      No acute intracranial hemorrhage noted with periventricular chronic small vessel ischemic changes and other findings detailed above                    Workstation performed: [id number]         MRI inpatient order    (Results Pending)         Results from last 7 days   Lab Units 05/19/22  0525 05/18/22  1407   WBC Thousand/uL 2 74* 3 60*   HEMOGLOBIN g/dL 12 0 13 4   HEMATOCRIT % 38 2 43 6   PLATELETS Thousands/uL 147* 166   NEUTROS ABS Thousands/µL  --  2 33         Results from last 7 days   Lab Units 05/19/22  0525 05/18/22  1407   SODIUM mmol/L 139 140   POTASSIUM mmol/L 3 2* 3 7   CHLORIDE mmol/L 104 105   CO2 mmol/L 26 30   ANION GAP mmol/L 9 5   BUN mg/dL 23 17   CREATININE mg/dL 0 98 0 93   EGFR ml/min/1 73sq m 55 59   CALCIUM mg/dL 9 1 9 2   MAGNESIUM mg/dL 1 7  --      Results from last 7 days   Lab Units 05/18/22  1407   AST U/L 26   ALT U/L 15   ALK PHOS U/L 75   TOTAL PROTEIN g/dL 7 8   ALBUMIN g/dL 3 5   TOTAL BILIRUBIN mg/dL 0 93         Results from last 7 days   Lab Units 05/19/22  0525 05/18/22  1407   GLUCOSE RANDOM mg/dL 95 112       Results from last 7 days   Lab Units 05/18/22  1821 05/18/22  1650 05/18/22  1407   HS TNI 0HR ng/L  --   --  8   HS TNI 2HR ng/L  --  9  --    HSTNI D2 ng/L  --  1  --    HS TNI 4HR ng/L 9  --   --    HSTNI D4 ng/L 1  --   --      ED Treatment:   Medication Administration from 05/18/2022 1300 to 05/18/2022 1740       Date/Time Order Dose Route Action Comments     05/18/2022 1422 labetalol (NORMODYNE) injection 10 mg 0 mg Intravenous Hold Hold per Dr Frances Stringer for now as BP is 180/90     05/18/2022 1708 labetalol (NORMODYNE) injection 10 mg 10 mg Intravenous Given         Past Medical History:   Diagnosis Date    Hypertension      Present on Admission:   (Resolved) Hypertensive crisis   Hypertensive urgency   Hypokalemia      Admitting Diagnosis: Hypertension [I10]  Leg swelling [M79 89]  Accelerated hypertension [I10]  Age/Sex: 64 y o  female  Admission Orders: daily wts, I/O, neuro cks, OT, SCD,   Scheduled Medications:  amLODIPine, 5 mg, Oral, Daily  chlorthalidone, 25 mg, Oral, Daily  heparin (porcine), 5,000 Units, Subcutaneous, Q8H [facility]  labetalol, 400 mg, Oral, Q12H ALEJANDRA  potassium chloride 20 mEq IVPB (premix)  Dose: 20 mEq  Freq: Once Route: IV  Last Dose: 20 mEq (05/19/22 1010)  Start: 05/19/22 0945 End: 05/19/22 1210            Continuous IV Infusions: none     PRN Meds:  acetaminophen, 650 mg, Oral, Q6H PRN  labetalol, 10 mg, Intravenous, Q6H PRN  ondansetron, 4 mg, Intravenous, Q6H PRN      Network Utilization Review Department  ATTENTION: Please call with any questions or concerns to [phone] and carefully listen to the prompts so that you are directed to the right person  All voicemails are confidential   Carlie Flores all requests for admission clinical reviews, approved or denied determinations and any other requests to dedicated fax number below belonging to the campus where the patient is receiving treatment   List of dedicated fax numbers for the Facilities:  [address] [place] [address] DENIALS (Administrative/Medical Necessity) [phone]   [address] (Maternity/NICU/Pediatrics) [phone]   [address] Pancho Olivier [address] [place] [address] [address] [address] [place] [address] Kyler Veronica [address] [place] [address] [phone]

## 2022-05-19 NOTE — PROGRESS NOTES
[address] [facility]  Progress Note Marija Boswell 1945, 64 y o  female MRN: [id number]  Unit/Bed#: MS Rahat-01 Encounter: [id number]  Primary Care Provider: Loida Acuna MD   Date and time admitted to hospital: 2022  1:28 PM    * Hypertensive urgency  Assessment & Plan  · Blood pressure on admission greater than 761 systolic  · No determination of end-organ damage as of yet  · Blurry vision could be secondary to hypertensive urgency  · Continue labetalol p o  40 mg b i d , chlorthalidone 25 mg p o  Daily  · Added amlodipine 5 mg  · MRI was ordered on admission and is pending  · No other neurological deficits noted on my exam   CT scan of the head did not show any acute intracranial abnormalities    Hypokalemia  Assessment & Plan  · Replacement has been ordered  · Recheck in a m  VTE Pharmacologic Prophylaxis: VTE Score: 4 Heparin    Patient Centered Rounds: I performed bedside rounds with nursing staff today  Discussions with Specialists or Other Care Team Provider:  Yes, case management, nursing    Education and Discussions with Family / Patient: Patient declined call to   Time Spent for Care: 15 minutes  More than 50% of total time spent on counseling and coordination of care as described above  Current Length of Stay: 1 day(s)  Current Patient Status: Inpatient   Certification Statement: The patient will continue to require additional inpatient hospital stay due to Need for blood pressure control and MRI is pending  Discharge Plan: Anticipate discharge in 24-48 hrs to home      Code Status: Level 1 - Full Code    Subjective:   Seen and examined at bedside  No new complaints  Resolved blurry vision    Objective:     Vitals:   Temp (24hrs), Av 2 °F (36 8 °C), Min:97 8 °F (36 6 °C), Max:98 6 °F (37 °C)    Temp:  [97 8 °F (36 6 °C)-98 6 °F (37 °C)] 97 8 °F (36 6 °C)  HR:  [62-75] 63  Resp:  [18-28] 20  BP: (126-239)/() 170/88  SpO2:  [95 %-99 %] 96 %  Body mass index is 26 44 kg/m²  Input and Output Summary (last 24 hours):      Intake/Output Summary (Last 24 hours) at 5/19/2022 1439  Last data filed at 5/19/2022 1249  Gross per 24 hour   Intake 720 ml   Output 900 ml   Net -180 ml       Physical Exam:   Physical Exam General- Awake, alert and oriented x 3, looks comfortable  HEENT- Normocephalic, atraumatic, oral mucosa- moist  Neck- Supple, No carotid bruit, no JVD  CVS- Normal S1/ S2, Regular rate and rhythm, No murmur, No edema  Respiratory system- B/L clear breath sounds, no wheezing  Abdomen- Soft, Non distended, no tenderness, Bowel sound- present 4 quads  Genitourinary- No suprapubic tenderness, No CVA tenderness  Skin- No new bruise or rash  Musculoskeletal- No gross deformity  Psych- No acute psychosis  CNS- CN II- XII grossly intact, No acute focal neurologic deficit noted      Additional Data:     Labs:  Results from last 7 days   Lab Units 05/19/22  0525 05/18/22  1407   WBC Thousand/uL 2 74* 3 60*   HEMOGLOBIN g/dL 12 0 13 4   HEMATOCRIT % 38 2 43 6   PLATELETS Thousands/uL 147* 166   NEUTROS PCT %  --  65   LYMPHS PCT %  --  24   MONOS PCT %  --  8   EOS PCT %  --  2     Results from last 7 days   Lab Units 05/19/22  0525 05/18/22  1407   SODIUM mmol/L 139 140   POTASSIUM mmol/L 3 2* 3 7   CHLORIDE mmol/L 104 105   CO2 mmol/L 26 30   BUN mg/dL 23 17   CREATININE mg/dL 0 98 0 93   ANION GAP mmol/L 9 5   CALCIUM mg/dL 9 1 9 2   ALBUMIN g/dL  --  3 5   TOTAL BILIRUBIN mg/dL  --  0 93   ALK PHOS U/L  --  75   ALT U/L  --  15   AST U/L  --  26   GLUCOSE RANDOM mg/dL 95 112                       Lines/Drains:  Invasive Devices  Report    Peripheral Intravenous Line  Duration           Peripheral IV 05/18/22 Right Antecubital 1 day                      Imaging: Reviewed radiology reports from this admission including: CT head    Recent Cultures (last 7 days):         Last 24 Hours Medication List:   Current Facility-Administered Medications Medication Dose Route Frequency Provider Last Rate    acetaminophen  650 mg Oral Q6H PRN Henrietta Libman, MD      amLODIPine  5 mg Oral Daily Tequila Serna MD      chlorthalidone  25 mg Oral Daily Henrietta Libman, MD      heparin (porcine)  5,000 Units Subcutaneous [facility] Henrietta Libman, MD      labetalol  10 mg Intravenous Q6H PRN Henrietta Libman, MD      labetalol  400 mg Oral Q12H [facility], MD      ondansetron  4 mg Intravenous Q6H PRN Henrietta Libman, MD          Today, Patient Was Seen By: Tequila Serna MD    **Please Note: This note may have been constructed using a voice recognition system  **

## 2022-05-19 NOTE — ASSESSMENT & PLAN NOTE
· Blood pressure on admission greater than 774 systolic  · No determination of end-organ damage as of yet  · Blurry vision could be secondary to hypertensive urgency  · Continue labetalol p o  40 mg b i d , chlorthalidone 25 mg p o   Daily  · Added amlodipine 5 mg  · MRI was ordered on admission and is pending  · No other neurological deficits noted on my exam   CT scan of the head did not show any acute intracranial abnormalities

## 2022-05-19 NOTE — OCCUPATIONAL THERAPY NOTE
Occupational Therapy Evaluation        Patient Name: Melissa UREÑA Date: 5/19/2022 05/19/22 0836   OT Last Visit   OT Visit Date 05/19/22   Note Type   Note type Evaluation   Restrictions/Precautions   Weight Bearing Precautions Per Order No   Braces or Orthoses Other (Comment)  (none per patient)   Other Precautions Chair Alarm; Bed Alarm; Fall Risk   Pain Assessment   Pain Assessment Tool 0-10   Pain Score No Pain   Home Living   Type of Home House   Home Layout Two level;Stairs to enter with rails;Bed/bath upstairs  (3-4 COURTNEY, FOS to 2 level)   Bathroom Shower/Tub Tub/shower unit   Bathroom Toilet Standard   Bathroom Equipment Shower chair   Bathroom Accessibility Accessible   Home Equipment Other (Comment)  (none per pt )   Additional Comments Patient ambulates without AD   Prior Function   Level of [place] Independent with ADLs and functional mobility   Lives With Daughter  (works during the day)   [address]  (shared responsibility with daughter)   Falls in the last 6 months 0   Vocational Retired   Lifestyle   Autonomy Patient reported independent with ADLs, IADLs, and functional mobility  Patient lives with daughter in a 2 level home with 3-4 steps to enter and a flight of steps to second level where bath and bed are located  Patient ambulates without the use of assistive devices and reports no falls within the last 6 months     Reciprocal Relationships supportive daughter   Psychosocial   Psychosocial (WDL) WDL   Subjective   Subjective " I have no pain I feel like normal"   ADL   Eating Assistance 6  Modified independent   Grooming Assistance 6  Modified Independent   UB Bathing Assistance 6  Modified Independent   LB Bathing Assistance 6  Modified Independent   UB Dressing Assistance 6  Modified independent   LB Dressing Assistance 6  Modified independent   Toileting Assistance  6  Modified independent   Functional Assistance 6  Modified independent   Bed Mobility   Supine to Sit 6  Modified independent   Additional items HOB elevated;Verbal cues   Transfers   Sit to Stand 6  Modified independent   Additional items Assist x 1;HOB elevated   Stand to Sit 6  Modified independent   Additional items Assist x 1; Armrests   Toilet transfer 6  Modified independent   Additional items Assist x 1;Standard toilet   Functional Mobility   Functional Mobility 6  Modified independent   Additional items   (none)   Balance   Static Sitting Good   Dynamic Sitting Good   Static Standing Fair +   Dynamic Standing Fair +   Activity Tolerance   Activity Tolerance Patient tolerated treatment well   Medical Staff Made Aware PT Etta   RUE Assessment   RUE Assessment WFL   LUE Assessment   LUE Assessment WFL   Hand Function   Gross Motor Coordination Functional   Fine Motor Coordination Functional   Sensation   Light Touch No apparent deficits  (BUEs)   Proprioception   Proprioception No apparent deficits  (BUEs)   Vision-Basic Assessment   Current Vision Wears glasses only for reading   Patient Visual Report Other (Comment)  (no changes noted)   Vision - Complex Assessment   Acuity Able to read clock/calendar on wall without difficulty; Able to read employee name badge without difficulty   Cognition   Overall Cognitive Status [facility]   Arousal/Participation Alert; Responsive; Cooperative   Attention Within functional limits   Orientation Level Oriented X4   Memory Within functional limits   Following Commands Follows all commands and directions without difficulty   Assessment   Assessment Patient is a 64 old female who was seen for an OT evaluation s/p admission to [address] [place] [address] on 5/18/22 due to blurred vision, dizziness, and weakness  Patient was diagnosed with a hypertensive crisis  Orders were placed for OT evaluation and treatment  At least two patient identifiers performed during session including name and wristband   Prior to admission patient reported independent with ADLs, IADLs, and functional mobility  Patient lives with daughter in a 2 level home with 3-4 steps to enter and a flight of steps to second level where bath and bed are located  Patient ambulates without the use of assistive devices and reports no falls within the last 6 months  Upon evaluation patient is modified independent with functional mobility, modified independent with UB ADLs, and modified independent for LB ADLs  Patients occupational performance is not affected by any notable deficits at this time  From an OT standpoint, d/c recommendation at this time would be home with family support, no rehab services needed     Recommendation   OT Discharge Recommendation No rehabilitation needs   AM-PAC Daily Activity Inpatient   Lower Body Dressing 4   Bathing 4   Toileting 4   Upper Body Dressing 4   Grooming 4   Eating 4   Daily Activity Raw Score 24   Daily Activity Standardized Score (Calc for Raw Score >=11) 57 54   AM-PAC Applied Cognition Inpatient   Following a Speech/Presentation 4   Understanding Ordinary Conversation 4   Taking Medications 4   Remembering Where Things Are Placed or Put Away 4   Remembering List of 4-5 Errands 4   Taking Care of Complicated Tasks 4   Applied Cognition Raw Score 24   Applied Cognition Standardized Score 62 21   Barthel Index   Feeding 10   Bathing 5   Grooming Score 5   Dressing Score 10   Bladder Score 10   Bowels Score 10   Toilet Use Score 10   Transfers (Bed/Chair) Score 10   Mobility (Level Surface) Score 10   Stairs Score 5   Barthel Index Score 85

## 2022-05-19 NOTE — CASE MANAGEMENT
Case Management Assessment & Discharge Planning Note    [facility]  Location [address] 332/-01 MRN [id number]  : 1945 Date 2022       Current Admission Date: 2022  Current Admission Diagnosis:Hypertensive urgency   Patient Active Problem List    Diagnosis Date Noted    Hypertensive urgency 2017    Hypokalemia 2017      LOS (days): 1  Geometric Mean LOS (GMLOS) (days): 2 20  Days to GMLOS:1 3     OBJECTIVE:    Risk of Unplanned Readmission Score: 8 34      Current admission status: Inpatient       Preferred Pharmacy:   [facility] [address]  [address] - [place] [id number] [place] [address] 59  N  Phone: [phone] Fax: [phone]    Primary Care Provider: Lashell Paz MD    Primary Insurance: [facility]  Secondary Insurance:     ASSESSMENT:  [address] [place] [address] - Daughter   Primary Phone: [phone] (Home)               Advance Directives  Does patient have a [address]?: Yes  Does patient have Advance Directives?: Yes  Advance Directives: Power of  for health care  Primary Contact: Tika-daughter    Readmission Root Cause  30 Day Readmission: No    Patient Information  Admitted from[de-identified] Home  Mental Status: Alert  During Assessment patient was accompanied by: Not accompanied during assessment  Assessment information provided by[de-identified] Patient  Primary Caregiver: Self  Support Systems: Daughter, Family members  [place] of Residence: [place] [address] do you live in?: [address] entry access options   Select all that apply : Stairs  Number of steps to enter home : 4  Do the steps have railings?: Yes  Type of Current Residence: 2 story home  Upon entering residence, is there a bedroom on the main floor (no further steps)?: No  A bedroom is located on the following floor levels of residence (select all that apply):: 2nd Floor  Upon entering residence, is there a bathroom on the main floor (no further steps)?: No  Indicate which floors of current residence have a bathroom (select all the apply):: 2nd Floor  Number of steps to 2nd floor from main floor: One Flight  In the last 12 months, was there a time when you were not able to pay the mortgage or rent on time?: No  In the last 12 months, was there a time when you did not have a steady place to sleep or slept in a shelter (including now)?: No  Homeless/housing insecurity resource given?: No  Living Arrangements: Lives w/ Daughter  Is patient a ?: No    Activities of Daily Living Prior to Admission  Functional Status: Independent  Completes ADLs independently?: Yes  Ambulates independently?: Yes  Does patient use assisted devices?: No  Does patient currently own DME?: No  Does patient have a history of Outpatient Therapy (PT/OT)?: No  Does the patient have a history of Short-Term Rehab?: No  Does patient have a history of HHC?: No  Does patient currently have [email or web address]?: No    Patient Information Continued  Income Source: Pension/long-term  Does patient have prescription coverage?: Yes  Food insecurity resource given?: No  Does patient receive dialysis treatments?: No  Does patient have a history of substance abuse?: No  Does patient have a history of Mental Health Diagnosis?: No    PHQ 2/9 Screening   Reviewed PHQ 2/9 Depression Screening Score?: No    Means of Transportation  Means of Transport to Appts[de-identified] Family transport  In the past 12 months, has lack of transportation kept you from medical appointments or from getting medications?: No  In the past 12 months, has lack of transportation kept you from meetings, work, or from getting things needed for daily living?: No  Was application for public transport provided?: No    DISCHARGE DETAILS:    Discharge planning discussed with[de-identified] patient  Freedom of Choice: Yes     CM contacted family/caregiver?: No- see comments (patient updated daugther at bedside earlier)  Were Treatment Team discharge recommendations reviewed with patient/caregiver?: Yes  Did patient/caregiver verbalize understanding of patient care needs?: Yes  Were patient/caregiver advised of the risks associated with not following Treatment Team discharge recommendations?: Yes    Requested [address]         Is the patient interested in [facility] at discharge?: No    DME Referral Provided  Referral made for DME?: No    Treatment Team Recommendation: Home  Discharge Destination Plan[de-identified] Home  Transport at Discharge : Family     ETA of Transport (Date): 05/20/22      CM spoke with patient at the bedside  CM introduced self and role  Patient updated per PT, no PT recommendations  CM discussed medications at discharge  Patient uses [facility] in [place] for medications  Patient stated she was taking Atenolol before but it was costing $109 for 3 months  Patient switched to metoprolol which was more cost efficient  Patient requesting medication follow up at discharge to make sure she is able to afford them  No other CM needs noted  Patient is Covid vaccinated and boosted  Patient's daughter able to transport at discharge  Daughter works in the evening  CM reviewed discharge planning process including the following: identifying caregivers at home, preference for d/c planning needs,   availability of [facility] to [facility] program, availability of treatment team to discuss questions or concerns patient and/or family may have regarding diagnosis, plan of care, old or new medications and discharge planning   CM will continue to follow for care coordination and update assessment as appropriate

## 2022-05-19 NOTE — PHYSICAL THERAPY NOTE
Physical Therapy Evaluation     Patient's Name: Franci Stallings    Admitting Diagnosis  Hypertension [I10]  Leg swelling [M79 89]  Accelerated hypertension [I10]    Problem List  Patient Active Problem List   Diagnosis    Hypertensive crisis     Past Medical History  Past Medical History:   Diagnosis Date    Hypertension      Past Surgical History  Past Surgical History:   Procedure Laterality Date    BREAST CYST EXCISION        05/19/22 0825   PT Last Visit   PT Visit Date 05/19/22   Note Type   Note type Evaluation   Pain Assessment   Pain Assessment Tool 0-10   Pain Score No Pain   Restrictions/Precautions   Weight Bearing Precautions Per Order No   Braces or Orthoses Other (Comment)  (none per patient)   Other Precautions Chair Alarm; Bed Alarm; Fall Risk   Home Living   Type of [address] [place] [address] Two level;Stairs to enter with rails;Bed/bath upstairs  (3-4 COURTNEY; flight of stairs to 2nd floor)   Bathroom Shower/Tub Tub/shower unit   Bathroom Toilet Standard   Bathroom Equipment Shower chair   [address] Other (Comment)  (none per patient)   Additional Comments Pt ambulates without an AD  Prior Function   Level of [place] Independent with ADLs and functional mobility   Lives With Daughter  (works during the day)   [address]  (shared responsibility with daughter)   Falls in the last 6 months 0   General   Family/Caregiver Present No   Cognition   Overall Cognitive Status WFL   Arousal/Participation Alert   Attention Within functional limits   Orientation Level Oriented X4   Memory Within functional limits   Following Commands Follows all commands and directions without difficulty   Comments Pt agreeable to PT     Subjective   Subjective "I feel better "   RUE Assessment   RUE Assessment   (defer to OT assessment)   LUE Assessment   LUE Assessment   (defer to OT assessment)   RLE Assessment   RLE Assessment WFL   LLE Assessment   LLE Assessment WFL   Light Touch   RLE Light Touch Impaired   RLE Light Touch Comments "tingling in bottom of foot"   LLE Light Touch Impaired   LLE Light Touch Comments "tingling in bottom of foot"   Bed Mobility   Supine to Sit 6  Modified independent   Additional items HOB elevated   Transfers   Sit to Stand 6  Modified independent   Additional items Increased time required   Stand to Sit 6  Modified independent   Additional items Increased time required   Ambulation/Elevation   Gait pattern WNL   Gait Assistance 7  Independent   Assistive Device None   Distance 200 feet   Stair Management Assistance 6  Modified independent   Additional items Verbal cues   Stair Management Technique One rail R;Alternating pattern; Foreward   Number of Stairs 7  (4, 4 inch; 3, 6 inch)   Balance   Static Sitting Good   Dynamic Sitting Good   Static Standing Good   Dynamic Standing Good   Ambulatory Good   Activity Tolerance   Activity Tolerance Patient tolerated treatment well   Medical Staff Made Aware OT Alana Figueroa OT Student [facility] Discussed with LEANN Solano   Assessment   Prognosis Good   Problem List Impaired sensation   Assessment Pt is 64year old female seen for PT evaluation s/p admit to [facility] on 5/18/2022 with Hypertensive crisis  PT consulted to assess pt's functional mobility and d/c needs  Order placed for PT eval and tx  Comorbidities affecting pt's physical performance at time of assessment include hypertension  PTA, pt was independent with all functional mobility without an AD  Pt resides with her daughter in a two level house with 3-4 COURTNEY and a flight of stairs to the second floor  Personal factors affecting pt at time of IE include lives in a two story house and stairs to enter home  Please find objective findings from PT assessment regarding body systems outlined above with impairments and limitations including altered sensation   The following objective measures performed on IE also reveal limitations: Barthel Index: 100/100, Modified Paris: 1 (no significant disability) and AM-PAC 6-Clicks: 32/07  Pt's clinical presentation is currently stable seen in pt's presentation of need for ongoing medical management/monitoring  Pt is independent with transfers and functional mobility  Pt with no acute PT needs identified  Plan to discontinue PT at this time  From PT/mobility standpoint, recommendation at time of d/c would be home with no PT needs     Barriers to Discharge None   Plan   PT Frequency Other (Comment)  (discontinue PT)   Recommendation   PT Discharge Recommendation No rehabilitation needs   AM-PAC Basic Mobility Inpatient   Turning in Bed Without Bedrails 4   Lying on Back to Sitting on Edge of Flat Bed 4   Moving Bed to Chair 4   Standing Up From Chair 4   Walk in Room 4   Climb 3-5 Stairs 4   Basic Mobility Inpatient Raw Score 24   Basic Mobility Standardized Score 57 68   Highest Level Of Mobility   JH-HLM Goal 8: Walk 250 feet or more   JH-HLM Achieved 7: Walk 25 feet or more   Modified Paris Scale   Modified Paris Scale 1   Barthel Index   Feeding 10   Bathing 5   Grooming Score 5   Dressing Score 10   Bladder Score 10   Bowels Score 10   Toilet Use Score 10   Transfers (Bed/Chair) Score 15   Mobility (Level Surface) Score 15   Stairs Score 10   Barthel Index Score 100     PT Evaluation Time: 5091-1037    Antelmo Burton, PT, DPT

## 2022-05-19 NOTE — PLAN OF CARE
Problem: CARDIOVASCULAR - ADULT  Goal: Maintains optimal cardiac output and hemodynamic stability  Description: INTERVENTIONS:  - Monitor I/O, vital signs and rhythm  - Monitor for S/S and trends of decreased cardiac output  - Administer and titrate ordered vasoactive medications to optimize hemodynamic stability  - Assess quality of pulses, skin color and temperature  - Assess for signs of decreased coronary artery perfusion  - Instruct patient to report change in severity of symptoms  Outcome: Progressing  Note: Blood pressures have returned to normal ranges  Patient denies changes in vision or mentation

## 2022-05-20 VITALS
RESPIRATION RATE: 15 BRPM | WEIGHT: 163.14 LBS | HEIGHT: 66 IN | DIASTOLIC BLOOD PRESSURE: 67 MMHG | TEMPERATURE: 98.1 F | SYSTOLIC BLOOD PRESSURE: 124 MMHG | HEART RATE: 66 BPM | OXYGEN SATURATION: 98 % | BODY MASS INDEX: 26.22 KG/M2

## 2022-05-20 LAB
ANION GAP SERPL CALCULATED.3IONS-SCNC: 8 MMOL/L (ref 4–13)
BUN SERPL-MCNC: 20 MG/DL (ref 5–25)
CALCIUM SERPL-MCNC: 8.8 MG/DL (ref 8.3–10.1)
CHLORIDE SERPL-SCNC: 105 MMOL/L (ref 100–108)
CO2 SERPL-SCNC: 28 MMOL/L (ref 21–32)
CREAT SERPL-MCNC: 0.91 MG/DL (ref 0.6–1.3)
ERYTHROCYTE [DISTWIDTH] IN BLOOD BY AUTOMATED COUNT: 15.2 % (ref 11.6–15.1)
GFR SERPL CREATININE-BSD FRML MDRD: 60 ML/MIN/1.73SQ M
GLUCOSE SERPL-MCNC: 105 MG/DL (ref 65–140)
HCT VFR BLD AUTO: 37.5 % (ref 34.8–46.1)
HGB BLD-MCNC: 11.7 G/DL (ref 11.5–15.4)
MCH RBC QN AUTO: 28.7 PG (ref 26.8–34.3)
MCHC RBC AUTO-ENTMCNC: 31.2 G/DL (ref 31.4–37.4)
MCV RBC AUTO: 92 FL (ref 82–98)
PLATELET # BLD AUTO: 146 THOUSANDS/UL (ref 149–390)
PMV BLD AUTO: 12.1 FL (ref 8.9–12.7)
POTASSIUM SERPL-SCNC: 3.2 MMOL/L (ref 3.5–5.3)
RBC # BLD AUTO: 4.07 MILLION/UL (ref 3.81–5.12)
SODIUM SERPL-SCNC: 141 MMOL/L (ref 136–145)
WBC # BLD AUTO: 2.58 THOUSAND/UL (ref 4.31–10.16)

## 2022-05-20 PROCEDURE — 85027 COMPLETE CBC AUTOMATED: CPT | Performed by: FAMILY MEDICINE

## 2022-05-20 PROCEDURE — 80048 BASIC METABOLIC PNL TOTAL CA: CPT | Performed by: FAMILY MEDICINE

## 2022-05-20 PROCEDURE — 99239 HOSP IP/OBS DSCHRG MGMT >30: CPT | Performed by: FAMILY MEDICINE

## 2022-05-20 RX ORDER — FAMOTIDINE 20 MG/1
20 TABLET, FILM COATED ORAL DAILY
COMMUNITY
Start: 2021-12-24

## 2022-05-20 RX ORDER — POLYVINYL ALCOHOL 14 MG/ML
1 SOLUTION/ DROPS OPHTHALMIC
COMMUNITY
Start: 2022-02-21

## 2022-05-20 RX ORDER — MECLIZINE HYDROCHLORIDE 25 MG/1
1 TABLET ORAL 3 TIMES DAILY PRN
COMMUNITY
Start: 2017-09-05

## 2022-05-20 RX ORDER — CHLORTHALIDONE 25 MG/1
1 TABLET ORAL DAILY
COMMUNITY

## 2022-05-20 RX ORDER — POTASSIUM CHLORIDE 20 MEQ/1
2 TABLET, EXTENDED RELEASE ORAL 2 TIMES DAILY
COMMUNITY

## 2022-05-20 RX ORDER — AMLODIPINE BESYLATE 5 MG/1
5 TABLET ORAL DAILY
Qty: 30 TABLET | Refills: 0 | Status: SHIPPED | OUTPATIENT
Start: 2022-05-21 | End: 2022-06-20

## 2022-05-20 RX ORDER — METOPROLOL SUCCINATE 50 MG/1
50 TABLET, EXTENDED RELEASE ORAL DAILY
COMMUNITY
Start: 2022-01-25 | End: 2022-05-20

## 2022-05-20 RX ORDER — FERROUS SULFATE 325(65) MG
1 TABLET ORAL DAILY
COMMUNITY
Start: 2021-11-22

## 2022-05-20 RX ORDER — POTASSIUM CHLORIDE 20 MEQ/1
20 TABLET, EXTENDED RELEASE ORAL 2 TIMES DAILY
Qty: 20 TABLET | Refills: 0 | Status: SHIPPED | OUTPATIENT
Start: 2022-05-20 | End: 2022-05-30

## 2022-05-20 RX ADMIN — CHLORTHALIDONE 25 MG: 25 TABLET ORAL at 09:14

## 2022-05-20 RX ADMIN — HEPARIN SODIUM 5000 UNITS: 5000 INJECTION INTRAVENOUS; SUBCUTANEOUS at 06:21

## 2022-05-20 RX ADMIN — AMLODIPINE BESYLATE 5 MG: 5 TABLET ORAL at 09:14

## 2022-05-20 RX ADMIN — LABETALOL HYDROCHLORIDE 400 MG: 100 TABLET, FILM COATED ORAL at 09:13

## 2022-05-20 NOTE — DISCHARGE SUMMARY
[facility]     Discharge- Renetta Harper 1945, 64 y o  female MRN: [id number]  Unit/Bed#: MS Giang-01 Encounter: [id number]  Primary Care Provider: Betsy Magdaleno MD   Date and time admitted to hospital: 5/18/2022  1:28 PM    * Hypertensive urgency  Assessment & Plan  · Blood pressure on admission greater than 094 systolic  · Blurry vision could be secondary to hypertensive urgency  · MRI Brain (5/19/22): Mild chronic microangiopathic change within the brain parenchyma which has progressed since prior study from 2017  No acute ischemia  Persistent inferior displacement of the cerebellar tonsils below the foramen magnum consistent with Chiari I malformation  · Blood pressures improved; Continue current hypertension medication regimen - Labetalol 400 mg every 12 hrs, Chlorthalidone 25 mg daily and Amlodipine 5 mg daily  · Follow up with PCP    Hypokalemia  Assessment & Plan  · Noted to be 3 2 yesterday; given IV replacement  · Remains stable at 3 2 this morning  Medical Problems             Resolved Problems  Date Reviewed: 5/19/2022          Resolved    Hypertensive crisis 5/19/2022     Resolved by  Yonny Fermin MD    Overview Signed 8/5/2017 10:16 AM by Jovanny Yang MD     Control is improving, Cardiology following                     Discharging Physician / Practitioner: PATRICK Jones  PCP: Betsy Magdaleno MD  Admission Date:   Admission Orders (From admission, onward)     Ordered        05/18/22 [facility]  Once                      Discharge Date: 05/20/22    Consultations During Hospital Stay:  · None    Procedures Performed:   · None    Significant Findings / Test Results:   MRI brain wo contrast   Final Result by Magdaleno Yanes DO (05/20 0800)      Mild chronic microangiopathic change within the brain parenchyma which has progressed since prior study from 2017  No acute ischemia        Persistent inferior displacement of the cerebellar tonsils below the foramen magnum consistent with Chiari I malformation  Workstation performed: [id number]         VAS lower limb venous duplex study, unilateral/limited   Final Result by Dalila Rushing MD (05/18 1629)      CT head without contrast   Final Result by Spencer Todd MD (05/18 1443)      No acute intracranial hemorrhage noted with periventricular chronic small vessel ischemic changes and other findings detailed above  Workstation performed: [id number]         ·   ·     Incidental Findings:   · None     Test Results Pending at Discharge (will require follow up): · None     Outpatient Tests Requested:  · Follow up wit PCP within 1 week    Complications:  None    Reason for Admission: Hypertensive urgency    Hospital Course:   Raf Levine is a 64 y o  female patient with past medical history of hypertension who originally presented to the hospital on 5/18/2022 due to blurry vision, dizziness and weakness noted the morning of admission  On arrival to the ER, noted to have a blood pressure of 200 SBP  With improvement of her blood pressure, blurry vision/dizziness improved  CT Head without any acute intracranial abnormalities; MRI Brain obtained; results noted above  Patient with the addition of Amlodipine with improvement of her blood pressure  She will need outpatient follow-up with her PCP for closer hypertension management    Please see above list of diagnoses and related plan for additional information       Condition at Discharge: stable    Discharge Day Visit / Exam:   Subjective:  I am feeling better    Vitals: Blood Pressure: 124/67 (05/20/22 1059)  Pulse: 66 (05/20/22 1059)  Temperature: 98 1 °F (36 7 °C) (05/20/22 0738)  Temp Source: Oral (05/18/22 2208)  Respirations: 15 (05/19/22 2334)  Height: 5' 6" (167 6 cm) (05/18/22 1304)  Weight - Scale: 74 kg (163 lb 2 3 oz) (05/20/22 0639)  SpO2: 98 % (05/20/22 1059)     Exam:   Physical Exam General- Awake, alert and oriented x 3, looks comfortable  HEENT- Normocephalic, atraumatic, oral mucosa- moist  Neck- Supple, No carotid bruit, no JVD  CVS- Normal S1/ S2, Regular rate and rhythm, No murmur, No edema  Respiratory system- B/L clear breath sounds, no wheezing  Abdomen- Soft, Non distended, no tenderness, Bowel sound- present 4 quads  Genitourinary- No suprapubic tenderness, No CVA tenderness  Skin- No new bruise or rash  Musculoskeletal- No gross deformity  Psych- No acute psychosis  CNS- CN II- XII grossly intact, No acute focal neurologic deficit noted      Discussion with Family: Attempted to update  (daughter) via phone  Unable to contact  I called Enrique Pardo at [phone] and the phone does not seem to be working  Unable to leave a voicemail    Discharge instructions/Information to patient and family:   See after visit summary for information provided to patient and family  Provisions for Follow-Up Care:  See after visit summary for information related to follow-up care and any pertinent home health orders  Disposition:   Home    Planned Readmission:  No     Discharge Statement:  I spent 35 minutes discharging the patient  This time was spent on the day of discharge  I had direct contact with the patient on the day of discharge  Greater than 50% of the total time was spent examining patient, answering all patient questions, arranging and discussing plan of care with patient as well as directly providing post-discharge instructions  Additional time then spent on discharge activities  Discharge Medications:  See after visit summary for reconciled discharge medications provided to patient and/or family        **Please Note: This note may have been constructed using a voice recognition system**

## 2022-05-20 NOTE — QUICK NOTE
I wanted to update the patient about her potassium levels and that she should get a blood work in couple days and followup with PCP  Also take potassium tablets 20kdur BID  Both phone numbers in the chart are not working and I have been unsuccessful to get a hold of her   Will send a mail

## 2022-05-20 NOTE — ASSESSMENT & PLAN NOTE
· Blood pressure on admission greater than 312 systolic  · Blurry vision could be secondary to hypertensive urgency  · MRI Brain (5/19/22): Mild chronic microangiopathic change within the brain parenchyma which has progressed since prior study from 2017  No acute ischemia  Persistent inferior displacement of the cerebellar tonsils below the foramen magnum consistent with Chiari I malformation  · Blood pressures improved; Continue current hypertension medication regimen - Labetalol 400 mg every 12 hrs, Chlorthalidone 25 mg daily and Amlodipine 5 mg daily    · Follow up with PCP

## 2022-05-20 NOTE — CASE MANAGEMENT
Case Management Progress Note    Patient name Marky Rodrigueztead  Location [address] 332/-01 MRN [id number]  : 1945 Date 2022       LOS (days): 2  Geometric Mean LOS (GMLOS) (days): 2 20  Days to GMLOS:0 4        OBJECTIVE:        Current admission status: Inpatient  Preferred Pharmacy:   [address]  [address] [place], [place] - [address]  [address]  Phone: [phone] Fax: [phone]    Primary Care Provider: Aniceto Villalobos MD    Primary Insurance: [address] [place] [address],[address] REP  Secondary Insurance:     PROGRESS NOTE:  CM called patient's pharmacy and confirmed her Amlodipine has $0 copay  As per SLIM, she will dc home today

## 2022-05-20 NOTE — PLAN OF CARE
Problem: CARDIOVASCULAR - ADULT  Goal: Maintains optimal cardiac output and hemodynamic stability  Description: INTERVENTIONS:  - Monitor I/O, vital signs and rhythm  - Monitor for S/S and trends of decreased cardiac output  - Administer and titrate ordered vasoactive medications to optimize hemodynamic stability  - Assess quality of pulses, skin color and temperature  - Assess for signs of decreased coronary artery perfusion  - Instruct patient to report change in severity of symptoms  Outcome: Adequate for Discharge     Problem: DISCHARGE PLANNING  Goal: Discharge to home or other facility with appropriate resources  Description: INTERVENTIONS:  - Identify barriers to discharge w/patient and caregiver  - Arrange for needed discharge resources and transportation as appropriate  - Identify discharge learning needs (meds, wound care, etc )  - Arrange for interpretive services to assist at discharge as needed  - Refer to Case Management Department for coordinating discharge planning if the patient needs post-hospital services based on physician/advanced practitioner order or complex needs related to functional status, cognitive ability, or social support system  Outcome: Adequate for Discharge

## 2023-03-26 ENCOUNTER — HOSPITAL ENCOUNTER (EMERGENCY)
Facility: HOSPITAL | Age: 78
Discharge: HOME | End: 2023-03-26
Attending: EMERGENCY MEDICINE

## 2023-03-26 VITALS
BODY MASS INDEX: 28.93 KG/M2 | RESPIRATION RATE: 17 BRPM | HEIGHT: 66 IN | HEART RATE: 79 BPM | OXYGEN SATURATION: 99 % | TEMPERATURE: 99.4 F | SYSTOLIC BLOOD PRESSURE: 105 MMHG | WEIGHT: 180 LBS | DIASTOLIC BLOOD PRESSURE: 56 MMHG

## 2023-03-26 DIAGNOSIS — R21 RASH AND NONSPECIFIC SKIN ERUPTION: Primary | ICD-10-CM

## 2023-03-26 LAB
ALBUMIN SERPL BCP-MCNC: 3.4 G/DL (ref 3.5–5)
ALP SERPL-CCNC: 44 U/L (ref 34–104)
ALT SERPL W P-5'-P-CCNC: 9 U/L (ref 7–52)
ANION GAP SERPL CALCULATED.3IONS-SCNC: 6 MMOL/L (ref 4–13)
APTT PPP: 31 SECONDS (ref 23–37)
AST SERPL W P-5'-P-CCNC: 16 U/L (ref 13–39)
BASOPHILS # BLD AUTO: 0.01 THOUSANDS/ÂΜL (ref 0–0.1)
BASOPHILS NFR BLD AUTO: 0 % (ref 0–1)
BILIRUB SERPL-MCNC: 1.57 MG/DL (ref 0.2–1)
BUN SERPL-MCNC: 17 MG/DL (ref 5–25)
CALCIUM ALBUM COR SERPL-MCNC: 9.6 MG/DL (ref 8.3–10.1)
CALCIUM SERPL-MCNC: 9.1 MG/DL (ref 8.4–10.2)
CHLORIDE SERPL-SCNC: 106 MMOL/L (ref 96–108)
CO2 SERPL-SCNC: 25 MMOL/L (ref 21–32)
CREAT SERPL-MCNC: 1.23 MG/DL (ref 0.6–1.3)
EOSINOPHIL # BLD AUTO: 0.02 THOUSAND/ÂΜL (ref 0–0.61)
EOSINOPHIL NFR BLD AUTO: 0 % (ref 0–6)
ERYTHROCYTE [DISTWIDTH] IN BLOOD BY AUTOMATED COUNT: 14.8 % (ref 11.6–15.1)
GFR SERPL CREATININE-BSD FRML MDRD: 41 ML/MIN/1.73SQ M
GLUCOSE SERPL-MCNC: 109 MG/DL (ref 65–140)
HCT VFR BLD AUTO: 32.9 % (ref 34.8–46.1)
HGB BLD-MCNC: 10.3 G/DL (ref 11.5–15.4)
IMM GRANULOCYTES # BLD AUTO: 0.01 THOUSAND/UL (ref 0–0.2)
IMM GRANULOCYTES NFR BLD AUTO: 0 % (ref 0–2)
INR PPP: 1.14 (ref 0.84–1.19)
LYMPHOCYTES # BLD AUTO: 0.69 THOUSANDS/ÂΜL (ref 0.6–4.47)
LYMPHOCYTES NFR BLD AUTO: 13 % (ref 14–44)
MCH RBC QN AUTO: 30.7 PG (ref 26.8–34.3)
MCHC RBC AUTO-ENTMCNC: 31.3 G/DL (ref 31.4–37.4)
MCV RBC AUTO: 98 FL (ref 82–98)
MONOCYTES # BLD AUTO: 0.16 THOUSAND/ÂΜL (ref 0.17–1.22)
MONOCYTES NFR BLD AUTO: 3 % (ref 4–12)
NEUTROPHILS # BLD AUTO: 4.27 THOUSANDS/ÂΜL (ref 1.85–7.62)
NEUTS SEG NFR BLD AUTO: 84 % (ref 43–75)
NRBC BLD AUTO-RTO: 0 /100 WBCS
PLATELET # BLD AUTO: 153 THOUSANDS/UL (ref 149–390)
PMV BLD AUTO: 11.3 FL (ref 8.9–12.7)
POTASSIUM SERPL-SCNC: 3.8 MMOL/L (ref 3.5–5.3)
PROT SERPL-MCNC: 7 G/DL (ref 6.4–8.4)
PROTHROMBIN TIME: 14.4 SECONDS (ref 11.6–14.5)
RBC # BLD AUTO: 3.35 MILLION/UL (ref 3.81–5.12)
SODIUM SERPL-SCNC: 137 MMOL/L (ref 135–147)
WBC # BLD AUTO: 5.16 THOUSAND/UL (ref 4.31–10.16)

## 2023-03-26 RX ORDER — FAMOTIDINE 20 MG/1
20 TABLET, FILM COATED ORAL 2 TIMES DAILY
Qty: 10 TABLET | Refills: 0 | Status: SHIPPED | OUTPATIENT
Start: 2023-03-26 | End: 2023-03-31

## 2023-03-26 RX ORDER — FAMOTIDINE 10 MG/ML
20 INJECTION, SOLUTION INTRAVENOUS ONCE
Status: COMPLETED | OUTPATIENT
Start: 2023-03-26 | End: 2023-03-26

## 2023-03-26 RX ORDER — PREDNISONE 10 MG/1
10 TABLET ORAL DAILY
Qty: 63 TABLET | Refills: 0 | Status: SHIPPED | OUTPATIENT
Start: 2023-03-26

## 2023-03-26 RX ORDER — DIPHENHYDRAMINE HYDROCHLORIDE 50 MG/ML
25 INJECTION INTRAMUSCULAR; INTRAVENOUS ONCE
Status: COMPLETED | OUTPATIENT
Start: 2023-03-26 | End: 2023-03-26

## 2023-03-26 RX ORDER — METHYLPREDNISOLONE SODIUM SUCCINATE 125 MG/2ML
80 INJECTION, POWDER, LYOPHILIZED, FOR SOLUTION INTRAMUSCULAR; INTRAVENOUS ONCE
Status: COMPLETED | OUTPATIENT
Start: 2023-03-26 | End: 2023-03-26

## 2023-03-26 RX ADMIN — DIPHENHYDRAMINE HYDROCHLORIDE 25 MG: 50 INJECTION, SOLUTION INTRAMUSCULAR; INTRAVENOUS at 15:55

## 2023-03-26 RX ADMIN — FAMOTIDINE 20 MG: 10 INJECTION, SOLUTION INTRAVENOUS at 15:55

## 2023-03-26 RX ADMIN — METHYLPREDNISOLONE SODIUM SUCCINATE 80 MG: 125 INJECTION, POWDER, FOR SOLUTION INTRAMUSCULAR; INTRAVENOUS at 15:55

## 2023-03-26 NOTE — ED PROVIDER NOTES
History  Chief Complaint   Patient presents with   • Allergic Reaction     Pt c/o hives all over since Friday, daughter states pt was on clindamycin and cbd oil  ,     20-year-old female here for evaluation of a rash  Rash started about 3 days ago  Began after starting clindamycin and using CBD oil for what she was told was cellulitis of her bilateral lower extremities  Rash is itchy and at times a burning sensation  It is diffuse and widespread across the body including across her chest abdomen and back and upper and lower extremities  Denies any new foods  Denies contact with similar rash  The rash has not been blistering  She has not had any fevers  No oral lesions  History provided by:  Patient   used: No    Allergic Reaction  Presenting symptoms: rash        Prior to Admission Medications   Prescriptions Last Dose Informant Patient Reported? Taking? HYDROcodone-acetaminophen (NORCO) 5-325 mg per tablet   No No   Sig: Take 1 tablet by mouth every 6 (six) hours as needed (severe pain, not otherwise controlled)Max Daily Amount: 4 tablets   Probiotic Product (Misc Intestinal Eugenia Regulat) CAPS   Yes No   Sig: Take 1 capsule by mouth   Probiotic Product (Misc Intestinal Eugenia Regulat) CAPS   Yes No   Sig: Take 1 capsule by mouth   amLODIPine (NORVASC) 5 mg tablet   No No   Sig: Take 1 tablet (5 mg total) by mouth in the morning  aspirin 81 mg chewable tablet   Yes No   Sig: Chew 81 mg daily   chlorthalidone 25 mg tablet   No No   Sig: Take 1 tablet by mouth daily   chlorthalidone 25 mg tablet   Yes No   Sig: Take 1 tablet by mouth in the morning  famotidine (PEPCID) 20 mg tablet   Yes No   Sig: Take 20 mg by mouth in the morning  ferrous sulfate 325 (65 Fe) mg tablet   Yes No   Sig: Take 1 tablet by mouth in the morning     fluticasone (FLONASE) 50 mcg/act nasal spray   No No   Si spray into each nostril daily   labetalol (NORMODYNE) 200 mg tablet   No No   Sig: Take 2 tablets by mouth every 12 (twelve) hours   magnesium oxide (MAG-OX) 400 mg   No No   Sig: Take 1 tablet by mouth daily for 5 days   meclizine (ANTIVERT) 25 mg tablet   Yes No   Sig: Take 1 tablet by mouth as needed in the morning and 1 tablet as needed at noon and 1 tablet as needed in the evening  omeprazole (PriLOSEC) 20 mg delayed release capsule   Yes No   Sig: Take 20 mg by mouth daily   ondansetron (ZOFRAN-ODT) 4 mg disintegrating tablet   No No   Sig: Take 1 tablet (4 mg total) by mouth every 6 (six) hours as needed for nausea or vomiting   polyvinyl alcohol (LIQUIFILM TEARS) 1 4 % ophthalmic solution   Yes No   Sig: Apply 1 drop to eye   potassium chloride (K-DUR,KLOR-CON) 20 mEq tablet   No No   Sig: Take 2 tablets by mouth 2 (two) times a day   potassium chloride (K-DUR,KLOR-CON) 20 mEq tablet   No No   Sig: Take 1 tablet (20 mEq total) by mouth in the morning and 1 tablet (20 mEq total) in the evening  Do all this for 10 days  potassium chloride (Klor-Con M20) 20 mEq tablet   Yes No   Sig: Take 2 tablets by mouth in the morning and 2 tablets in the evening  Facility-Administered Medications: None       Past Medical History:   Diagnosis Date   • Hypertension        Past Surgical History:   Procedure Laterality Date   • BREAST CYST EXCISION         Family History   Family history unknown: Yes     I have reviewed and agree with the history as documented  E-Cigarette/Vaping     E-Cigarette/Vaping Substances     Social History     Tobacco Use   • Smoking status: Never   • Smokeless tobacco: Never   Substance Use Topics   • Alcohol use: Yes     Comment: Socially   • Drug use: No       Review of Systems   Constitutional: Negative for chills and fever  HENT: Negative for ear pain and sore throat  Eyes: Negative for pain and visual disturbance  Respiratory: Negative for cough and shortness of breath  Cardiovascular: Negative for chest pain and palpitations     Gastrointestinal: Negative for abdominal pain and vomiting  Genitourinary: Negative for dysuria and hematuria  Musculoskeletal: Negative for arthralgias and back pain  Skin: Positive for rash  Negative for color change  Neurological: Negative for seizures and syncope  All other systems reviewed and are negative  Physical Exam  Physical Exam  Vitals and nursing note reviewed  Constitutional:       General: She is not in acute distress  Appearance: She is well-developed  HENT:      Head: Normocephalic and atraumatic  Eyes:      Conjunctiva/sclera: Conjunctivae normal    Cardiovascular:      Rate and Rhythm: Normal rate and regular rhythm  Heart sounds: No murmur heard  Pulmonary:      Effort: Pulmonary effort is normal  No respiratory distress  Breath sounds: Normal breath sounds  Abdominal:      Palpations: Abdomen is soft  Tenderness: There is no abdominal tenderness  Musculoskeletal:         General: No swelling  Cervical back: Neck supple  Skin:     General: Skin is warm and dry  Capillary Refill: Capillary refill takes less than 2 seconds  Comments: There is a diffuse maculopapular rash blanching across the chest and nonblanching on the lower extremities  There are no vesicles pustules or blisters  There are no mucosal lesions   Neurological:      Mental Status: She is alert     Psychiatric:         Mood and Affect: Mood normal          Vital Signs  ED Triage Vitals [03/26/23 1517]   Temperature Pulse Respirations Blood Pressure SpO2   99 4 °F (37 4 °C) 79 17 105/56 99 %      Temp Source Heart Rate Source Patient Position - Orthostatic VS BP Location FiO2 (%)   Oral Monitor Sitting Left arm --      Pain Score       --           Vitals:    03/26/23 1517   BP: 105/56   Pulse: 79   Patient Position - Orthostatic VS: Sitting         Visual Acuity      ED Medications  Medications   methylPREDNISolone sodium succinate (Solu-MEDROL) injection 80 mg (80 mg Intravenous Given 3/26/23 1555) diphenhydrAMINE (BENADRYL) injection 25 mg (25 mg Intravenous Given 3/26/23 1555)   Famotidine (PF) (PEPCID) injection 20 mg (20 mg Intravenous Given 3/26/23 1555)       Diagnostic Studies  Results Reviewed     Procedure Component Value Units Date/Time    Protime-INR [[id number]]  (Normal) Collected: 03/26/23 1555    Lab Status: Final result Specimen: Blood from Arm, Left Updated: 03/26/23 1628     Protime 14 4 seconds      INR 1 14    APTT [[id number]]  (Normal) Collected: 03/26/23 1555    Lab Status: Final result Specimen: Blood from Arm, Left Updated: 03/26/23 1628     PTT 31 seconds     Comprehensive metabolic panel [[id number]]  (Abnormal) Collected: 03/26/23 1555    Lab Status: Final result Specimen: Blood from Arm, Left Updated: 03/26/23 1620     Sodium 137 mmol/L      Potassium 3 8 mmol/L      Chloride 106 mmol/L      CO2 25 mmol/L      ANION GAP 6 mmol/L      BUN 17 mg/dL      Creatinine 1 23 mg/dL      Glucose 109 mg/dL      Calcium 9 1 mg/dL      Corrected Calcium 9 6 mg/dL      AST 16 U/L      ALT 9 U/L      Alkaline Phosphatase 44 U/L      Total Protein 7 0 g/dL      Albumin 3 4 g/dL      Total Bilirubin 1 57 mg/dL      eGFR 41 ml/min/1 73sq m     Narrative:      Nannette guidelines for Chronic Kidney Disease (CKD):   •  Stage 1 with normal or high GFR (GFR > 90 mL/min/1 73 square meters)  •  Stage 2 Mild CKD (GFR = 60-89 mL/min/1 73 square meters)  •  Stage 3A Moderate CKD (GFR = 45-59 mL/min/1 73 square meters)  •  Stage 3B Moderate CKD (GFR = 30-44 mL/min/1 73 square meters)  •  Stage 4 Severe CKD (GFR = 15-29 mL/min/1 73 square meters)  •  Stage 5 End Stage CKD (GFR <15 mL/min/1 73 square meters)  Note: GFR calculation is accurate only with a steady state creatinine    CBC and differential [[id number]]  (Abnormal) Collected: 03/26/23 1555    Lab Status: Final result Specimen: Blood from Arm, Left Updated: 03/26/23 1604     WBC 5 16 Thousand/uL      RBC 3 35 Million/uL Hemoglobin 10 3 g/dL      Hematocrit 32 9 %      MCV 98 fL      MCH 30 7 pg      MCHC 31 3 g/dL      RDW 14 8 %      MPV 11 3 fL      Platelets 530 Thousands/uL      nRBC 0 /100 WBCs      Neutrophils Relative 84 %      Immat GRANS % 0 %      Lymphocytes Relative 13 %      Monocytes Relative 3 %      Eosinophils Relative 0 %      Basophils Relative 0 %      Neutrophils Absolute 4 27 Thousands/µL      Immature Grans Absolute 0 01 Thousand/uL      Lymphocytes Absolute 0 69 Thousands/µL      Monocytes Absolute 0 16 Thousand/µL      Eosinophils Absolute 0 02 Thousand/µL      Basophils Absolute 0 01 Thousands/µL                  No orders to display              Procedures  Procedures         ED Course               Identification of Seniors at [address] [place] [address] Most Recent Value   (ISAR) Identification of Seniors at Risk    Before the illness or injury that brought you to the Emergency, did you need someone to help you on a regular basis? 0 Filed at: 03/26/2023 1519   In the last 24 hours, have you needed more help than usual? 0 Filed at: 03/26/2023 1519   Have you been hospitalized for one or more nights during the past 6 months? 0 Filed at: 03/26/2023 1519   In general, do you see well? 1 Filed at: 03/26/2023 1519   In general, do you have serious problems with your memory? 1 Filed at: 03/26/2023 1519   Do you take more than three different medications every day? 0 Filed at: 03/26/2023 1519   ISAR Score 2 Filed at: 03/26/2023 1519                      SBIRT 22yo+    Flowsheet Row Most Recent Value   SBIRT (25 yo +)    In order to provide better care to our patients, we are screening all of our patients for alcohol and drug use  Would it be okay to ask you these screening questions? Yes Filed at: 03/26/2023 6842   Initial Alcohol Screen: US AUDIT-C     1  How often do you have a drink containing alcohol? 0 Filed at: 03/26/2023 6298   2   How many drinks containing alcohol do you have on a typical day you are drinking? 0 Filed at: 03/26/2023 1535   3b  FEMALE Any Age, or MALE 65+: How often do you have 4 or more drinks on one occassion? 0 Filed at: 03/26/2023 1535   Audit-C Score 0 Filed at: 03/26/2023 1535   CECILY: How many times in the past year have you    Used an illegal drug or used a prescription medication for non-medical reasons? Never Filed at: 03/26/2023 1535                    Medical Decision Making  Differential diagnosis includes but is not limited to allergic reaction, drug rash, doubt SJS, vasculitis  Plan: We will check a CBC and coags  MDM: 77 over the rash  Itching improved with Benadryl  Labs unremarkable  Suspect drug rash versus allergic reaction  Start steroids and antihistamines  Follow-up with PCP  Stop the clindamycin and stop CBD oil  We discussed return parameters  Amount and/or Complexity of Data Reviewed  Labs: ordered  Risk  Prescription drug management  Disposition  Final diagnoses:   Rash and nonspecific skin eruption     Time reflects when diagnosis was documented in both MDM as applicable and the Disposition within this note     Time User Action Codes Description Comment    3/26/2023  4:46 PM Misti BECK Add [R21] Rash and nonspecific skin eruption       ED Disposition     ED Disposition   Discharge    Condition   Stable    Date/Time   Sun Mar 26, 2023  4:46 PM    [address] discharge to home/self care  Follow-up Information     Follow up With Specialties Details Why Og Orta MD Family Medicine Call  As needed [facility] [address] [address]  [phone]            Discharge Medication List as of 3/26/2023  4:48 PM      START taking these medications    Details   !! famotidine (PEPCID) 20 mg tablet Take 1 tablet (20 mg total) by mouth 2 (two) times a day for 5 days, Starting Sun 3/26/2023, Until Fri 3/31/2023, Print      predniSONE 10 mg tablet Take 1 tablet (10 mg total) by mouth daily 6 tabs for 3 days and then decrease by one tab every 3 days until complete, Starting Sun 3/26/2023, Print       !! - Potential duplicate medications found  Please discuss with provider        CONTINUE these medications which have NOT CHANGED    Details   amLODIPine (NORVASC) 5 mg tablet Take 1 tablet (5 mg total) by mouth in the morning , Starting Sat 5/21/2022, Until Mon 6/20/2022, Normal      aspirin 81 mg chewable tablet Chew 81 mg daily, Starting u 9/20/2012, Historical Med      !! chlorthalidone 25 mg tablet Take 1 tablet by mouth daily, Starting Sat 8/5/2017, Print      !! chlorthalidone 25 mg tablet Take 1 tablet by mouth in the morning , Historical Med      !! famotidine (PEPCID) 20 mg tablet Take 20 mg by mouth in the morning , Starting Fri 12/24/2021, Historical Med      ferrous sulfate 325 (65 Fe) mg tablet Take 1 tablet by mouth in the morning , Starting Mon 11/22/2021, Historical Med      fluticasone (FLONASE) 50 mcg/act nasal spray 1 spray into each nostril daily, Starting Fri 8/25/2017, Normal      HYDROcodone-acetaminophen (NORCO) 5-325 mg per tablet Take 1 tablet by mouth every 6 (six) hours as needed (severe pain, not otherwise controlled)Max Daily Amount: 4 tablets, Starting Tue 6/16/2020, Normal      labetalol (NORMODYNE) 200 mg tablet Take 2 tablets by mouth every 12 (twelve) hours, Starting Fri 8/25/2017, Normal      magnesium oxide (MAG-OX) 400 mg Take 1 tablet by mouth daily for 5 days, Starting Sat 8/5/2017, Until Fri 8/18/2017, Print      meclizine (ANTIVERT) 25 mg tablet Take 1 tablet by mouth as needed in the morning and 1 tablet as needed at noon and 1 tablet as needed in the evening , Starting Tue 9/5/2017, Historical Med      omeprazole (PriLOSEC) 20 mg delayed release capsule Take 20 mg by mouth daily, Starting Mon 4/10/2017, Historical Med      ondansetron (ZOFRAN-ODT) 4 mg disintegrating tablet Take 1 tablet (4 mg total) by mouth every 6 (six) hours as needed for nausea or vomiting, Starting Tue 6/16/2020, Normal      polyvinyl alcohol (LIQUIFILM TEARS) 1 4 % ophthalmic solution Apply 1 drop to eye, Starting Mon 2/21/2022, Historical Med      !! potassium chloride (K-DUR,KLOR-CON) 20 mEq tablet Take 2 tablets by mouth 2 (two) times a day, Starting Fri 8/25/2017, Normal      !! potassium chloride (Klor-Con M20) 20 mEq tablet Take 2 tablets by mouth in the morning and 2 tablets in the evening , Historical Med      !! Probiotic Product (Misc Intestinal Eugenia Regulat) CAPS Take 1 capsule by mouth, Historical Med      !! Probiotic Product (Misc Intestinal Eugenia Regulat) CAPS Take 1 capsule by mouth, Historical Med       !! - Potential duplicate medications found  Please discuss with provider  No discharge procedures on file      PDMP Review     None          ED Provider  Electronically Signed by           Elva Kennedy PA-C  03/26/23 5184

## 2023-07-11 ENCOUNTER — APPOINTMENT (EMERGENCY)
Dept: CT IMAGING | Facility: HOSPITAL | Age: 78
End: 2023-07-11
Payer: COMMERCIAL

## 2023-07-11 ENCOUNTER — HOSPITAL ENCOUNTER (EMERGENCY)
Facility: HOSPITAL | Age: 78
Discharge: HOME | End: 2023-07-11
Attending: EMERGENCY MEDICINE | Admitting: EMERGENCY MEDICINE
Payer: COMMERCIAL

## 2023-07-11 VITALS
WEIGHT: 180 LBS | SYSTOLIC BLOOD PRESSURE: 180 MMHG | BODY MASS INDEX: 29.05 KG/M2 | OXYGEN SATURATION: 96 % | DIASTOLIC BLOOD PRESSURE: 81 MMHG | RESPIRATION RATE: 17 BRPM | HEART RATE: 92 BPM | TEMPERATURE: 97.6 F

## 2023-07-11 DIAGNOSIS — N12 PYELONEPHRITIS OF RIGHT KIDNEY: ICD-10-CM

## 2023-07-11 DIAGNOSIS — R10.9 ACUTE ABDOMINAL PAIN IN RIGHT FLANK: Primary | ICD-10-CM

## 2023-07-11 LAB
ALBUMIN SERPL BCP-MCNC: 3.9 G/DL (ref 3.5–5)
ALP SERPL-CCNC: 56 U/L (ref 34–104)
ALT SERPL W P-5'-P-CCNC: 10 U/L (ref 7–52)
ANION GAP SERPL CALCULATED.3IONS-SCNC: 9 MMOL/L
AST SERPL W P-5'-P-CCNC: 16 U/L (ref 13–39)
BACTERIA UR QL AUTO: ABNORMAL /HPF
BASOPHILS # BLD AUTO: 0 THOUSANDS/ÂΜL (ref 0–0.1)
BASOPHILS NFR BLD AUTO: 0 % (ref 0–1)
BILIRUB SERPL-MCNC: 1.5 MG/DL (ref 0.2–1)
BILIRUB UR QL STRIP: NEGATIVE
BUN SERPL-MCNC: 16 MG/DL (ref 5–25)
CALCIUM SERPL-MCNC: 9.6 MG/DL (ref 8.4–10.2)
CHLORIDE SERPL-SCNC: 106 MMOL/L (ref 96–108)
CLARITY UR: ABNORMAL
CO2 SERPL-SCNC: 25 MMOL/L (ref 21–32)
COLOR UR: YELLOW
CREAT SERPL-MCNC: 1.26 MG/DL (ref 0.6–1.3)
EOSINOPHIL # BLD AUTO: 0 THOUSAND/ÂΜL (ref 0–0.61)
EOSINOPHIL NFR BLD AUTO: 0 % (ref 0–6)
ERYTHROCYTE [DISTWIDTH] IN BLOOD BY AUTOMATED COUNT: 15.2 % (ref 11.6–15.1)
GFR SERPL CREATININE-BSD FRML MDRD: 40 ML/MIN/1.73SQ M
GLUCOSE SERPL-MCNC: 146 MG/DL (ref 65–140)
GLUCOSE UR STRIP-MCNC: NEGATIVE MG/DL
HCT VFR BLD AUTO: 36.7 % (ref 34.8–46.1)
HGB BLD-MCNC: 11.4 G/DL (ref 11.5–15.4)
HGB UR QL STRIP.AUTO: ABNORMAL
IMM GRANULOCYTES # BLD AUTO: 0.02 THOUSAND/UL (ref 0–0.2)
IMM GRANULOCYTES NFR BLD AUTO: 0 % (ref 0–2)
KETONES UR STRIP-MCNC: NEGATIVE MG/DL
LEUKOCYTE ESTERASE UR QL STRIP: ABNORMAL
LIPASE SERPL-CCNC: 6 U/L (ref 11–82)
LYMPHOCYTES # BLD AUTO: 0.86 THOUSANDS/ÂΜL (ref 0.6–4.47)
LYMPHOCYTES NFR BLD AUTO: 16 % (ref 14–44)
MCH RBC QN AUTO: 29.2 PG (ref 26.8–34.3)
MCHC RBC AUTO-ENTMCNC: 31.1 G/DL (ref 31.4–37.4)
MCV RBC AUTO: 94 FL (ref 82–98)
MONOCYTES # BLD AUTO: 0.44 THOUSAND/ÂΜL (ref 0.17–1.22)
MONOCYTES NFR BLD AUTO: 8 % (ref 4–12)
NEUTROPHILS # BLD AUTO: 4.24 THOUSANDS/ÂΜL (ref 1.85–7.62)
NEUTS SEG NFR BLD AUTO: 76 % (ref 43–75)
NITRITE UR QL STRIP: NEGATIVE
NON-SQ EPI CELLS URNS QL MICRO: ABNORMAL /HPF
NRBC BLD AUTO-RTO: 0 /100 WBCS
PH UR STRIP.AUTO: 6 [PH]
PLATELET # BLD AUTO: 111 THOUSANDS/UL (ref 149–390)
PMV BLD AUTO: 12.2 FL (ref 8.9–12.7)
POTASSIUM SERPL-SCNC: 3.8 MMOL/L (ref 3.5–5.3)
PROT SERPL-MCNC: 8 G/DL (ref 6.4–8.4)
PROT UR STRIP-MCNC: ABNORMAL MG/DL
RBC # BLD AUTO: 3.91 MILLION/UL (ref 3.81–5.12)
RBC #/AREA URNS AUTO: ABNORMAL /HPF
SODIUM SERPL-SCNC: 140 MMOL/L (ref 135–147)
SP GR UR STRIP.AUTO: 1.02 (ref 1–1.03)
UROBILINOGEN UR STRIP-ACNC: <2 MG/DL
WBC # BLD AUTO: 5.56 THOUSAND/UL (ref 4.31–10.16)
WBC #/AREA URNS AUTO: ABNORMAL /HPF

## 2023-07-11 PROCEDURE — 74177 CT ABD & PELVIS W/CONTRAST: CPT

## 2023-07-11 PROCEDURE — 87186 SC STD MICRODIL/AGAR DIL: CPT | Performed by: EMERGENCY MEDICINE

## 2023-07-11 PROCEDURE — 85025 COMPLETE CBC W/AUTO DIFF WBC: CPT | Performed by: EMERGENCY MEDICINE

## 2023-07-11 PROCEDURE — 87086 URINE CULTURE/COLONY COUNT: CPT | Performed by: EMERGENCY MEDICINE

## 2023-07-11 PROCEDURE — 81001 URINALYSIS AUTO W/SCOPE: CPT | Performed by: EMERGENCY MEDICINE

## 2023-07-11 PROCEDURE — 87077 CULTURE AEROBIC IDENTIFY: CPT | Performed by: EMERGENCY MEDICINE

## 2023-07-11 PROCEDURE — 83690 ASSAY OF LIPASE: CPT | Performed by: EMERGENCY MEDICINE

## 2023-07-11 PROCEDURE — 80053 COMPREHEN METABOLIC PANEL: CPT | Performed by: EMERGENCY MEDICINE

## 2023-07-11 PROCEDURE — 36415 COLL VENOUS BLD VENIPUNCTURE: CPT

## 2023-07-11 RX ORDER — LABETALOL HYDROCHLORIDE 5 MG/ML
10 INJECTION, SOLUTION INTRAVENOUS ONCE
Status: COMPLETED | OUTPATIENT
Start: 2023-07-11 | End: 2023-07-11

## 2023-07-11 RX ORDER — CEFTRIAXONE 1 G/50ML
1000 INJECTION, SOLUTION INTRAVENOUS ONCE
Status: COMPLETED | OUTPATIENT
Start: 2023-07-11 | End: 2023-07-11

## 2023-07-11 RX ORDER — MORPHINE SULFATE 4 MG/ML
4 INJECTION, SOLUTION INTRAMUSCULAR; INTRAVENOUS ONCE
Status: COMPLETED | OUTPATIENT
Start: 2023-07-11 | End: 2023-07-11

## 2023-07-11 RX ORDER — CEPHALEXIN 500 MG/1
500 CAPSULE ORAL EVERY 6 HOURS SCHEDULED
Qty: 48 CAPSULE | Refills: 0 | Status: SHIPPED | OUTPATIENT
Start: 2023-07-11 | End: 2023-07-23

## 2023-07-11 RX ORDER — ONDANSETRON 4 MG/1
4 TABLET, ORALLY DISINTEGRATING ORAL EVERY 8 HOURS PRN
Qty: 12 TABLET | Refills: 0 | Status: SHIPPED | OUTPATIENT
Start: 2023-07-11

## 2023-07-11 RX ADMIN — MORPHINE SULFATE 4 MG: 4 INJECTION INTRAVENOUS at 18:23

## 2023-07-11 RX ADMIN — SODIUM CHLORIDE 500 ML: 0.9 INJECTION, SOLUTION INTRAVENOUS at 18:21

## 2023-07-11 RX ADMIN — IOHEXOL 100 ML: 350 INJECTION, SOLUTION INTRAVENOUS at 19:05

## 2023-07-11 RX ADMIN — CEFTRIAXONE 1000 MG: 1 INJECTION, SOLUTION INTRAVENOUS at 21:10

## 2023-07-11 RX ADMIN — Medication 10 MG: at 20:09

## 2023-07-11 NOTE — ED PROVIDER NOTES
History  Chief Complaint   Patient presents with   • Abdominal Pain     Patient reports right sided abdominal pain for the last two days. Patient also states "my mouth gets dry sometimes"     Patient is a 77-year-old female with past medical history of hypertension, hypokalemia, GERD, chronic pain in her bilateral arms and lower legs, presents to the emergency department complaining of right flank pain that radiates into her right lower quadrant that started yesterday. Pain is described as aching, constant. She states she has had the pain once before a long time ago but it went away on its own. She also states that her chronic pain in her bilateral arms and lower legs seems to be worsened. Denies any new injury to the extremities. She denies any new extremity swelling. She localized the extremity pain to her lower legs as well as from her bilateral hands radiating up to her bilateral shoulder joints. She denies any new fevers but reports feeling cold. Denies any dizziness, near syncope, diaphoresis, chest pain, palpitations, dyspnea, abdominal distention, nausea, vomiting, change in bowel habits, blood per rectum or melena, dysuria, change in urinary frequency, hematuria, skin rash or color change, extremity weakness or paresthesia or other focal neurologic deficits. She denies any prior abdominal surgeries. Denies any prior history of kidney stones. History provided by:  Patient   used: No    Abdominal Pain  Associated symptoms: chills    Associated symptoms: no chest pain, no constipation, no cough, no diarrhea, no dysuria, no fever, no hematuria, no nausea, no shortness of breath, no sore throat and no vomiting        Prior to Admission Medications   Prescriptions Last Dose Informant Patient Reported? Taking?    HYDROcodone-acetaminophen (NORCO) 5-325 mg per tablet   No No   Sig: Take 1 tablet by mouth every 6 (six) hours as needed (severe pain, not otherwise controlled)Max Daily Amount: 4 tablets   Probiotic Product (Misc Intestinal Eugenia Regulat) CAPS   Yes No   Sig: Take 1 capsule by mouth   Probiotic Product (Misc Intestinal Eugenia Regulat) CAPS   Yes No   Sig: Take 1 capsule by mouth   amLODIPine (NORVASC) 5 mg tablet   No No   Sig: Take 1 tablet (5 mg total) by mouth in the morning. aspirin 81 mg chewable tablet   Yes No   Sig: Chew 81 mg daily   chlorthalidone 25 mg tablet   No No   Sig: Take 1 tablet by mouth daily   chlorthalidone 25 mg tablet   Yes No   Sig: Take 1 tablet by mouth in the morning. famotidine (PEPCID) 20 mg tablet   Yes No   Sig: Take 20 mg by mouth in the morning. famotidine (PEPCID) 20 mg tablet   No No   Sig: Take 1 tablet (20 mg total) by mouth 2 (two) times a day for 5 days   ferrous sulfate 325 (65 Fe) mg tablet   Yes No   Sig: Take 1 tablet by mouth in the morning. fluticasone (FLONASE) 50 mcg/act nasal spray   No No   Si spray into each nostril daily   labetalol (NORMODYNE) 200 mg tablet   No No   Sig: Take 2 tablets by mouth every 12 (twelve) hours   magnesium oxide (MAG-OX) 400 mg   No No   Sig: Take 1 tablet by mouth daily for 5 days   meclizine (ANTIVERT) 25 mg tablet   Yes No   Sig: Take 1 tablet by mouth as needed in the morning and 1 tablet as needed at noon and 1 tablet as needed in the evening. omeprazole (PriLOSEC) 20 mg delayed release capsule   Yes No   Sig: Take 20 mg by mouth daily   ondansetron (ZOFRAN-ODT) 4 mg disintegrating tablet   No No   Sig: Take 1 tablet (4 mg total) by mouth every 6 (six) hours as needed for nausea or vomiting   polyvinyl alcohol (LIQUIFILM TEARS) 1.4 % ophthalmic solution   Yes No   Sig: Apply 1 drop to eye   potassium chloride (K-DUR,KLOR-CON) 20 mEq tablet   No No   Sig: Take 2 tablets by mouth 2 (two) times a day   potassium chloride (Klor-Con M20) 20 mEq tablet   Yes No   Sig: Take 2 tablets by mouth in the morning and 2 tablets in the evening.    predniSONE 10 mg tablet   No No   Sig: Take 1 tablet (10 mg total) by mouth daily 6 tabs for 3 days and then decrease by one tab every 3 days until complete      Facility-Administered Medications: None       Past Medical History:   Diagnosis Date   • Hypertension        Past Surgical History:   Procedure Laterality Date   • BREAST CYST EXCISION         Family History   Family history unknown: Yes     I have reviewed and agree with the history as documented. E-Cigarette/Vaping     E-Cigarette/Vaping Substances     Social History     Tobacco Use   • Smoking status: Never   • Smokeless tobacco: Never   Substance Use Topics   • Alcohol use: Yes     Comment: Socially   • Drug use: No       Review of Systems   Constitutional: Positive for chills. Negative for fever. HENT: Negative for congestion, ear pain, rhinorrhea and sore throat. Respiratory: Negative for cough, chest tightness, shortness of breath and wheezing. Cardiovascular: Negative for chest pain and palpitations. Gastrointestinal: Positive for abdominal pain. Negative for abdominal distention, blood in stool, constipation, diarrhea, nausea and vomiting. Genitourinary: Positive for flank pain. Negative for difficulty urinating, dysuria, frequency and hematuria. Musculoskeletal: Negative for back pain, joint swelling and neck pain. +Acute exacerbation of chronic bilateral arm and lower leg pain. Skin: Negative for color change, pallor, rash and wound. Allergic/Immunologic: Negative for immunocompromised state. Neurological: Negative for dizziness, syncope, weakness, light-headedness, numbness and headaches. Hematological: Negative for adenopathy. Does not bruise/bleed easily. Psychiatric/Behavioral: Negative for confusion and decreased concentration. All other systems reviewed and are negative. Physical Exam  Physical Exam  Vitals and nursing note reviewed. Constitutional:       General: She is not in acute distress. Appearance: Normal appearance.  She is well-developed. She is not ill-appearing, toxic-appearing or diaphoretic. HENT:      Head: Normocephalic and atraumatic. Right Ear: External ear normal.      Left Ear: External ear normal.      Mouth/Throat:      Mouth: Mucous membranes are moist.      Pharynx: Oropharynx is clear. Eyes:      Extraocular Movements: Extraocular movements intact. Conjunctiva/sclera: Conjunctivae normal.   Neck:      Vascular: No JVD. Cardiovascular:      Rate and Rhythm: Normal rate and regular rhythm. Pulses: Normal pulses. Heart sounds: Normal heart sounds. No murmur heard. No friction rub. No gallop. Pulmonary:      Effort: Pulmonary effort is normal. No respiratory distress. Breath sounds: Normal breath sounds. No wheezing, rhonchi or rales. Abdominal:      General: There is no distension. Palpations: Abdomen is soft. Tenderness: There is abdominal tenderness. There is no right CVA tenderness, left CVA tenderness, guarding or rebound. Comments: +RLQ abdominal tenderness. Mild tenderness in LLQ. Musculoskeletal:         General: No tenderness, deformity or signs of injury. Normal range of motion. Cervical back: Normal range of motion and neck supple. No rigidity. Comments: 1+ pitting edema bilateral lower legs and ankles. Skin:     General: Skin is warm and dry. Coloration: Skin is not pale. Findings: No erythema or rash. Neurological:      General: No focal deficit present. Mental Status: She is alert and oriented to person, place, and time. Sensory: No sensory deficit. Motor: No weakness.    Psychiatric:         Mood and Affect: Mood normal.         Behavior: Behavior normal.         Vital Signs  ED Triage Vitals   Temperature Pulse Respirations Blood Pressure SpO2   07/11/23 1449 07/11/23 1449 07/11/23 1449 07/11/23 1449 07/11/23 1449   97.6 °F (36.4 °C) 95 18 (!) 180/81 98 %      Temp src Heart Rate Source Patient Position - Orthostatic VS BP Location FiO2 (%)   -- 07/11/23 1449 07/11/23 1715 07/11/23 1715 --    Monitor Sitting Right arm       Pain Score       07/11/23 1715       5         Vitals:    07/11/23 1715 07/11/23 1730 07/11/23 1825 07/11/23 2100   BP: (!) 178/78 (!) 175/79 (!) 197/90 (!) 180/81   BP Location: Right arm  Right arm Right arm   Pulse: 94 89 92 92   Resp: 18 18 17   Temp:       SpO2: 95% 96% 99% 96%   Weight:           Visual Acuity  Visual Acuity    Flowsheet Row Most Recent Value   L Pupil Size (mm) 3   R Pupil Size (mm) 3          ED Medications  Medications   sodium chloride 0.9 % bolus 500 mL (0 mL Intravenous Stopped 7/11/23 2035)   morphine injection 4 mg (4 mg Intravenous Given 7/11/23 1823)   iohexol (OMNIPAQUE) 350 MG/ML injection (SINGLE-DOSE) 100 mL (100 mL Intravenous Given 7/11/23 1905)   labetalol (NORMODYNE) injection 10 mg (10 mg Intravenous Given 7/11/23 2009)   cefTRIAXone (ROCEPHIN) IVPB (premix in dextrose) 1,000 mg 50 mL (0 mg Intravenous Stopped 7/11/23 2138)       Diagnostic Studies  Results Reviewed     Procedure Component Value Units Date/Time    Urine Microscopic [[id number]]  (Abnormal) Collected: 07/11/23 1933    Lab Status: Final result Specimen: Urine, Clean Catch Updated: 07/11/23 1940     RBC, UA 4-10 /hpf      WBC, UA Innumerable /hpf      Epithelial Cells Occasional /hpf      Bacteria, UA Occasional /hpf     Urine culture [[id number]] Collected: 07/11/23 1933    Lab Status:  In process Specimen: Urine, Clean Catch Updated: 07/11/23 1940    UA w Reflex to Microscopic w Reflex to Culture [[id number]]  (Abnormal) Collected: 07/11/23 1933    Lab Status: Final result Specimen: Urine, Clean Catch Updated: 07/11/23 1939     Color, UA Yellow     Clarity, UA Turbid     Specific Gravity, UA 1.025     pH, UA 6.0     Leukocytes, UA Large     Nitrite, UA Negative     Protein, UA 30 (1+) mg/dl      Glucose, UA Negative mg/dl      Ketones, UA Negative mg/dl      Urobilinogen, UA <2.0 mg/dl Bilirubin, UA Negative     Occult Blood, UA Small    Comprehensive metabolic panel [[id number]]  (Abnormal) Collected: 07/11/23 1452    Lab Status: Final result Specimen: Blood from Arm, Left Updated: 07/11/23 1518     Sodium 140 mmol/L      Potassium 3.8 mmol/L      Chloride 106 mmol/L      CO2 25 mmol/L      ANION GAP 9 mmol/L      BUN 16 mg/dL      Creatinine 1.26 mg/dL      Glucose 146 mg/dL      Calcium 9.6 mg/dL      AST 16 U/L      ALT 10 U/L      Alkaline Phosphatase 56 U/L      Total Protein 8.0 g/dL      Albumin 3.9 g/dL      Total Bilirubin 1.50 mg/dL      eGFR 40 ml/min/1.73sq m     Narrative:      National Kidney Disease Foundation guidelines for Chronic Kidney Disease (CKD):   •  Stage 1 with normal or high GFR (GFR > 90 mL/min/1.73 square meters)  •  Stage 2 Mild CKD (GFR = 60-89 mL/min/1.73 square meters)  •  Stage 3A Moderate CKD (GFR = 45-59 mL/min/1.73 square meters)  •  Stage 3B Moderate CKD (GFR = 30-44 mL/min/1.73 square meters)  •  Stage 4 Severe CKD (GFR = 15-29 mL/min/1.73 square meters)  •  Stage 5 End Stage CKD (GFR <15 mL/min/1.73 square meters)  Note: GFR calculation is accurate only with a steady state creatinine    Lipase [[id number]]  (Abnormal) Collected: 07/11/23 1452    Lab Status: Final result Specimen: Blood from Arm, Left Updated: 07/11/23 1518     Lipase 6 u/L     CBC and differential [[id number]]  (Abnormal) Collected: 07/11/23 1452    Lab Status: Final result Specimen: Blood from Arm, Left Updated: 07/11/23 1502     WBC 5.56 Thousand/uL      RBC 3.91 Million/uL      Hemoglobin 11.4 g/dL      Hematocrit 36.7 %      MCV 94 fL      MCH 29.2 pg      MCHC 31.1 g/dL      RDW 15.2 %      MPV 12.2 fL      Platelets 771 Thousands/uL      nRBC 0 /100 WBCs      Neutrophils Relative 76 %      Immat GRANS % 0 %      Lymphocytes Relative 16 %      Monocytes Relative 8 %      Eosinophils Relative 0 %      Basophils Relative 0 %      Neutrophils Absolute 4.24 Thousands/µL      Immature Grans Absolute 0.02 Thousand/uL      Lymphocytes Absolute 0.86 Thousands/µL      Monocytes Absolute 0.44 Thousand/µL      Eosinophils Absolute 0.00 Thousand/µL      Basophils Absolute 0.00 Thousands/µL                  CT abdomen pelvis with contrast   Final Result by Holly Reilly MD (07/11 2036)      Heterogeneous enhancement of the posterior aspect of the right kidney suggesting pyelonephritis. Stable hepatomegaly. Trace bilateral pleural effusions with stable bibasilar lung nodules that have been attributed to benign pathology. Grossly stable bilateral renal cysts. Colonic diverticulosis without diverticulitis. Workstation performed: [id number]                    Procedures  Procedures         ED Course  ED Course as of 07/11/23 2143   Tue Jul 11, 2023   1603 WBC: 5.56   1603 Platelet Count(!): 972  Slightly lower than baseline but not significantly low. 1604 Hemoglobin(!): 11.4  Stable and at baseline. 1604 Creatinine: 1.26   1604 eGFR: 40  Renal function stable from labs 3 months ago. 1948 WBC, UA(!): Innumerable   1948 Bacteria, UA: Occasional   2101 Updated patient about work-up thus far including evidence of UTI on urinalysis as well as CT findings of focal pyelonephritis on the right side which explains her right-sided abdominal pain. We will give a dose of IV Rocephin in the ED and then discharged home with a course of antibiotics for treatment of pyelonephritis. I advised her to call her PCP tomorrow to schedule a follow-up appointment. I advised her to return immediately if her symptoms worsen, she develops fever, vomiting or any other concerning symptoms. SBIRT 22yo+    Flowsheet Row Most Recent Value   Initial Alcohol Screen: US AUDIT-C     1. How often do you have a drink containing alcohol? 0 Filed at: 07/11/2023 2006   2. How many drinks containing alcohol do you have on a typical day you are drinking?   0 Filed at: 07/11/2023 2006   3b. FEMALE Any Age, or MALE 65+: How often do you have 4 or more drinks on one occassion? 0 Filed at: 07/11/2023 2006   Audit-C Score 0 Filed at: 07/11/2023 2006   CECILY: How many times in the past year have you. .. Used an illegal drug or used a prescription medication for non-medical reasons? Never Filed at: 07/11/2023 2006                    Medical Decision Making  77-year-old female presents to the ED for 2 days of right flank pain radiating into the right lower abdomen with associated right lower quadrant tenderness. Differential includes renal colic secondary to kidney stone, UTI or pyelonephritis, musculoskeletal pain, acute appendicitis, biliary colic or cholecystitis, nonspecific enteritis, colitis, diverticulitis. Will work-up with abdominal labs, urinalysis and CT abdomen and pelvis with IV contrast.  In regards to her chronic upper and lower extremity pain which is bilateral and diffuse, there are no signs of injury, unilateral swelling, deformity. Will recommend she follow-up with her PCP for this chronic pain. Will give morphine for pain control in the ED. Amount and/or Complexity of Data Reviewed  Labs: ordered. Decision-making details documented in ED Course. Radiology: ordered. Decision-making details documented in ED Course. Risk  Prescription drug management. Disposition  Final diagnoses:   Acute abdominal pain in right flank   Pyelonephritis of right kidney     Time reflects when diagnosis was documented in both MDM as applicable and the Disposition within this note     Time User Action Codes Description Comment    7/11/2023  9:18 PM Misti NOVA Add [R10.9] Acute abdominal pain in right flank     7/11/2023  9:19 PM Misti NOVA Add [N12] Pyelonephritis of right kidney       ED Disposition     ED Disposition   Discharge    Condition   Stable    Date/Time   Tue Jul 11, 2023  9:19 PM    [address] [facility] discharge to home/self care. Follow-up Information     Follow up With Specialties Details Why [address], [place] Family Medicine Schedule an appointment as soon as possible for a visit   [address] [facility]  Floor 1  [place]. [address] [address]  [address] [facility] Emergency Department Emergency Medicine Go to  If symptoms worsen [address] [facility]  [address] [facility] Emergency Department, [place], [place], [address]          Patient's Medications   Discharge Prescriptions    CEPHALEXIN (KEFLEX) 500 MG CAPSULE    Take 1 capsule (500 mg total) by mouth every 6 (six) hours for 12 days       Start Date: 7/11/2023 End Date: 7/23/2023       Order Dose: 500 mg       Quantity: 48 capsule    Refills: 0    ONDANSETRON (ZOFRAN-ODT) 4 MG DISINTEGRATING TABLET    Take 1 tablet (4 mg total) by mouth every 8 (eight) hours as needed for nausea or vomiting       Start Date: 7/11/2023 End Date: --       Order Dose: 4 mg       Quantity: 12 tablet    Refills: 0       No discharge procedures on file.     PDMP Review     None          ED Provider  Electronically Signed by           Hunter Ferrer DO  07/11/23 0400

## 2023-07-13 LAB — BACTERIA UR CULT: ABNORMAL

## 2024-04-30 NOTE — PROGRESS NOTES
CC:  Leora Hart is here today for Surgical Followup    Medications: medications verified, no change  Added preferred pharmacy  Denies  known Latex allergy or symptoms of Latex sensitivity.  All allergies and medications reviewed.  Patient would like communication of their results via:    [employer]    Cell Phone:   Telephone Information:   Mobile [phone]     Okay to leave a message containing results? Yes      Rooming documentation of social history includes tobacco screening only.     Daily Note     Today's date: 2021  Patient name: Sesar Ayala  : 1945  MRN: [id number]  Referring provider: Hayley Marquez MD  Dx:   Encounter Diagnosis     ICD-10-CM    1  Chronic pain of right knee  M25 561     G89 29    2  Chronic pain of left knee  M25 562     G89 29        Start Time: 1519  Stop Time: 1600  Total time in clinic (min): 41 minutes    Subjective: Pt reports that her knees are doing good today  She has bad days occasionally but reports it could be worse  Objective: See treatment diary below      Assessment: Pt was appropriately fatigued post therapy session without any increases in pain to report  Visual and verbal cues to ensure correct exercise technique  Patient able to maintain DL balance on foam with only mild sway in ankle  Plan: Continue with current POC to address pt deficits        Precautions: HTN- takes meds, but high       Manual 8/19 8/24   7/29 8/3 8/5 8/10 8/12 8/17   MFR             Patellar mobs                          Total time             Neuro Re-Ed             Stance on foam 5' 10"x10   5 5  5  10'x10   Stability disc 5'    5 5  5     Foam balance beam Fwd/bkwd/sdw x5 ea    5 5 10x ea 10x ea     hurdles     x6 6x       SLR  3x10 ea b/l        3x10 3x10   Clamshells  2x10 ea b/l        20x 20x   Side stepping  3 laps YTB       3 laps 3 laps    Ther Ex             BP assessed             Bike 10 10'   10' 10' 10' 10' 10' 10'   SLR/VMO SLR             S/l hip abduction             / bar 5-way x20     2x10 20x      Step up/dwn/sdws 6" 6" fwd/side b/l 2x10   x10 6" 2x10 6" 2x10 fwd/side 6" 2x10 6" fwd/side 2x10 6" fwd/side 2x10 ea   Seated hip flex/knee ext/add with ball/abd with band             CS/HS     30"  x3 Off step 30" 3x Off step 30" 3x 30" 3x 30" 3x 30"x3   Mini squats x20 x20       2x10 2x10   PT ed - HEP             Ther Activity             stairs             Sit<>stand x20    x10 2x10 2x10 2x10     Gait Training             TM Modalities             MH

## 2025-02-20 NOTE — LETTER
2021    Char Anderson MD  [address] [facility]    Patient: Susan Carvalho   YOB: 1945   Date of Visit: 8/10/2021     Encounter Diagnosis     ICD-10-CM    1  Chronic pain of right knee  M25 561     G89 29    2  Chronic pain of left knee  M25 562     G89 29        Dear Dr Hank Red: Thank you for your recent referral of Susan Carvalho  Please review the attached evaluation summary from Barb's recent visit  Please verify that you agree with the plan of care by signing the attached order  If you have any questions or concerns, please do not hesitate to call  I sincerely appreciate the opportunity to share in the care of one of your patients and hope to have another opportunity to work with you in the near future  Sincerely,    Fatmata Mello PT      Referring Provider:      I certify that I have read the below Plan of Care and certify the need for these services furnished under this plan of treatment while under my care  Char Anderson MD  [address]  [address]  Via Fax: [phone]          RE-CERTIFICATION     Today's date: 8/10/2021  Patient name: Susan Carvalho  : 1945  MRN: [id number]  Referring provider: Lucero Barahona MD  Dx:   Encounter Diagnosis     ICD-10-CM    1  Chronic pain of right knee  M25 561     G89 29    2   Chronic pain of left knee  M25 562     G89 29        Start Time: 1500  Stop Time: 1545  Total time in clinic (min): 45 minutes    Subjective: patient reports minimal pain      Objective: BL LE hip flex 4+/5         Knee ext  4/5          Flexion 4+/5             Ankle Df  4+/5             Arom BL knee flex 111 deg      Assessment: increased Strength and Rom; able to ascend/descend stairs with 35% decrease in difficulty      Plan: Cont POC 2x/week - 4 weeks; STGs 75% Met; LTGs 35% Met     Precautions: HTN- takes meds, but high       Manual 7/8 7/12 7/14 7/27 7/29 8/3 8/5 8/10     MFR  RB Patellar mobs  RB                        Total time             Neuro Re-Ed             Stance on foam    5 5 5  5     Stability disc    5 5 5  5     Foam balance beam    5 5 5 10x ea 10x ea     hurdles     x6 6x                                              Ther Ex             BP assessed  5' 5'          Bike  8' 7' 10' 10' 10' 10' 10'     SLR/VMO SLR  15x b/l ; 10x b/l            S/l hip abduction  10x b/l            / bar 5-way      2x10 20x      Step up/dwn/sdws  10x b/l step ups  x10 x10 6" 2x10 6" 2x10 fwd/side 6" 2x10     Seated hip flex/knee ext/add with ball/abd with band             CS/HS  30"x3  30" x3 30"  x3 Off step 30" 3x Off step 30" 3x 30" 3x     Mini squats  5" x 10           PT ed - HEP 15            Ther Activity             stairs             Sit<>stand    x10 x10 2x10 2x10 2x10     Gait Training             TM                          Modalities             MH DC instructions

## 2025-05-24 ENCOUNTER — HOSPITAL ENCOUNTER (EMERGENCY)
Facility: HOSPITAL | Age: 80
Discharge: HOME | End: 2025-05-25
Attending: STUDENT IN AN ORGANIZED HEALTH CARE EDUCATION/TRAINING PROGRAM | Admitting: EMERGENCY MEDICINE
Payer: COMMERCIAL

## 2025-05-24 DIAGNOSIS — K80.20 CHOLELITHIASIS: ICD-10-CM

## 2025-05-24 DIAGNOSIS — R10.9 ABDOMINAL PAIN: Primary | ICD-10-CM

## 2025-05-24 LAB
ALBUMIN SERPL BCG-MCNC: 4.1 G/DL (ref 3.5–5)
ALP SERPL-CCNC: 75 U/L (ref 34–104)
ALT SERPL W P-5'-P-CCNC: 24 U/L (ref 7–52)
ANION GAP SERPL CALCULATED.3IONS-SCNC: 7 MMOL/L (ref 4–13)
AST SERPL W P-5'-P-CCNC: 29 U/L (ref 13–39)
BASOPHILS # BLD AUTO: 0.03 THOUSANDS/ÂΜL (ref 0–0.1)
BASOPHILS NFR BLD AUTO: 1 % (ref 0–1)
BILIRUB SERPL-MCNC: 0.83 MG/DL (ref 0.2–1)
BUN SERPL-MCNC: 13 MG/DL (ref 5–25)
CALCIUM SERPL-MCNC: 9.8 MG/DL (ref 8.4–10.2)
CHLORIDE SERPL-SCNC: 107 MMOL/L (ref 96–108)
CO2 SERPL-SCNC: 27 MMOL/L (ref 21–32)
CREAT SERPL-MCNC: 0.94 MG/DL (ref 0.6–1.3)
EOSINOPHIL # BLD AUTO: 0.19 THOUSAND/ÂΜL (ref 0–0.61)
EOSINOPHIL NFR BLD AUTO: 4 % (ref 0–6)
ERYTHROCYTE [DISTWIDTH] IN BLOOD BY AUTOMATED COUNT: 14 % (ref 11.6–15.1)
GFR SERPL CREATININE-BSD FRML MDRD: 57 ML/MIN/1.73SQ M
GLUCOSE SERPL-MCNC: 100 MG/DL (ref 65–140)
HCT VFR BLD AUTO: 39.7 % (ref 34.8–46.1)
HGB BLD-MCNC: 12.4 G/DL (ref 11.5–15.4)
IMM GRANULOCYTES # BLD AUTO: 0.01 THOUSAND/UL (ref 0–0.2)
IMM GRANULOCYTES NFR BLD AUTO: 0 % (ref 0–2)
LACTATE SERPL-SCNC: 1.2 MMOL/L (ref 0.5–2)
LIPASE SERPL-CCNC: 11 U/L (ref 11–82)
LYMPHOCYTES # BLD AUTO: 1.75 THOUSANDS/ÂΜL (ref 0.6–4.47)
LYMPHOCYTES NFR BLD AUTO: 34 % (ref 14–44)
MCH RBC QN AUTO: 28.7 PG (ref 26.8–34.3)
MCHC RBC AUTO-ENTMCNC: 31.2 G/DL (ref 31.4–37.4)
MCV RBC AUTO: 92 FL (ref 82–98)
MONOCYTES # BLD AUTO: 0.49 THOUSAND/ÂΜL (ref 0.17–1.22)
MONOCYTES NFR BLD AUTO: 10 % (ref 4–12)
NEUTROPHILS # BLD AUTO: 2.7 THOUSANDS/ÂΜL (ref 1.85–7.62)
NEUTS SEG NFR BLD AUTO: 51 % (ref 43–75)
NRBC BLD AUTO-RTO: 0 /100 WBCS
PLATELET # BLD AUTO: 179 THOUSANDS/UL (ref 149–390)
PMV BLD AUTO: 11.5 FL (ref 8.9–12.7)
POTASSIUM SERPL-SCNC: 3.6 MMOL/L (ref 3.5–5.3)
PROT SERPL-MCNC: 7.8 G/DL (ref 6.4–8.4)
RBC # BLD AUTO: 4.32 MILLION/UL (ref 3.81–5.12)
SODIUM SERPL-SCNC: 141 MMOL/L (ref 135–147)
WBC # BLD AUTO: 5.17 THOUSAND/UL (ref 4.31–10.16)

## 2025-05-24 PROCEDURE — 85025 COMPLETE CBC W/AUTO DIFF WBC: CPT

## 2025-05-24 PROCEDURE — 99285 EMERGENCY DEPT VISIT HI MDM: CPT

## 2025-05-24 PROCEDURE — 83690 ASSAY OF LIPASE: CPT

## 2025-05-24 PROCEDURE — 36415 COLL VENOUS BLD VENIPUNCTURE: CPT

## 2025-05-24 PROCEDURE — 84484 ASSAY OF TROPONIN QUANT: CPT

## 2025-05-24 PROCEDURE — 83605 ASSAY OF LACTIC ACID: CPT

## 2025-05-24 PROCEDURE — 80053 COMPREHEN METABOLIC PANEL: CPT

## 2025-05-24 PROCEDURE — 93005 ELECTROCARDIOGRAM TRACING: CPT

## 2025-05-25 ENCOUNTER — APPOINTMENT (EMERGENCY)
Dept: RADIOLOGY | Facility: HOSPITAL | Age: 80
End: 2025-05-25
Payer: COMMERCIAL

## 2025-05-25 ENCOUNTER — APPOINTMENT (EMERGENCY)
Dept: CT IMAGING | Facility: HOSPITAL | Age: 80
End: 2025-05-25
Payer: COMMERCIAL

## 2025-05-25 ENCOUNTER — APPOINTMENT (EMERGENCY)
Dept: ULTRASOUND IMAGING | Facility: HOSPITAL | Age: 80
End: 2025-05-25
Payer: COMMERCIAL

## 2025-05-25 VITALS
HEIGHT: 63 IN | DIASTOLIC BLOOD PRESSURE: 79 MMHG | WEIGHT: 187.17 LBS | OXYGEN SATURATION: 100 % | HEART RATE: 84 BPM | TEMPERATURE: 97.8 F | RESPIRATION RATE: 20 BRPM | SYSTOLIC BLOOD PRESSURE: 174 MMHG | BODY MASS INDEX: 33.16 KG/M2

## 2025-05-25 PROBLEM — Q44.1 GALLBLADDER ANOMALY: Status: ACTIVE | Noted: 2025-05-25

## 2025-05-25 PROBLEM — K43.9 VENTRAL HERNIA: Status: ACTIVE | Noted: 2025-05-25

## 2025-05-25 LAB
2HR DELTA HS TROPONIN: 0 NG/L
ATRIAL RATE: 87 BPM
ATRIAL RATE: 90 BPM
BACTERIA UR QL AUTO: ABNORMAL /HPF
BILIRUB UR QL STRIP: NEGATIVE
CARDIAC TROPONIN I PNL SERPL HS: 7 NG/L (ref ?–50)
CARDIAC TROPONIN I PNL SERPL HS: 7 NG/L (ref ?–50)
CLARITY UR: CLEAR
COLOR UR: ABNORMAL
GLUCOSE UR STRIP-MCNC: NEGATIVE MG/DL
HGB UR QL STRIP.AUTO: ABNORMAL
KETONES UR STRIP-MCNC: NEGATIVE MG/DL
LEUKOCYTE ESTERASE UR QL STRIP: NEGATIVE
NITRITE UR QL STRIP: NEGATIVE
NON-SQ EPI CELLS URNS QL MICRO: ABNORMAL /HPF
P AXIS: 50 DEGREES
P AXIS: 57 DEGREES
PH UR STRIP.AUTO: 7 [PH]
PR INTERVAL: 140 MS
PR INTERVAL: 142 MS
PROT UR STRIP-MCNC: ABNORMAL MG/DL
QRS AXIS: -21 DEGREES
QRS AXIS: -22 DEGREES
QRSD INTERVAL: 76 MS
QRSD INTERVAL: 76 MS
QT INTERVAL: 356 MS
QT INTERVAL: 362 MS
QTC INTERVAL: 435 MS
QTC INTERVAL: 435 MS
RBC #/AREA URNS AUTO: ABNORMAL /HPF
SP GR UR STRIP.AUTO: 1.02 (ref 1–1.03)
T WAVE AXIS: 51 DEGREES
T WAVE AXIS: 56 DEGREES
UROBILINOGEN UR STRIP-ACNC: <2 MG/DL
VENTRICULAR RATE: 87 BPM
VENTRICULAR RATE: 90 BPM
WBC #/AREA URNS AUTO: ABNORMAL /HPF

## 2025-05-25 PROCEDURE — 74177 CT ABD & PELVIS W/CONTRAST: CPT

## 2025-05-25 PROCEDURE — 93010 ELECTROCARDIOGRAM REPORT: CPT | Performed by: INTERNAL MEDICINE

## 2025-05-25 PROCEDURE — 99204 OFFICE O/P NEW MOD 45 MIN: CPT | Performed by: PHYSICIAN ASSISTANT

## 2025-05-25 PROCEDURE — 71046 X-RAY EXAM CHEST 2 VIEWS: CPT

## 2025-05-25 PROCEDURE — 76705 ECHO EXAM OF ABDOMEN: CPT

## 2025-05-25 PROCEDURE — NC001 PR NO CHARGE

## 2025-05-25 PROCEDURE — 81001 URINALYSIS AUTO W/SCOPE: CPT

## 2025-05-25 PROCEDURE — 36415 COLL VENOUS BLD VENIPUNCTURE: CPT

## 2025-05-25 PROCEDURE — 84484 ASSAY OF TROPONIN QUANT: CPT

## 2025-05-25 RX ORDER — ACETAMINOPHEN 325 MG/1
650 TABLET ORAL ONCE
Status: COMPLETED | OUTPATIENT
Start: 2025-05-25 | End: 2025-05-25

## 2025-05-25 RX ADMIN — IOHEXOL 100 ML: 350 INJECTION, SOLUTION INTRAVENOUS at 00:28

## 2025-05-25 RX ADMIN — ACETAMINOPHEN 650 MG: 325 TABLET ORAL at 01:20

## 2025-05-25 NOTE — ASSESSMENT & PLAN NOTE
Contracted GB seen on CT.  RUQ non-tender to palpation.  Ordered RUQ US.  Await results for final dispo.  Discussed with ED and general surgeon on call.

## 2025-05-25 NOTE — CONSULTS
Consultation - Surgery-General   Name: Barb Pate 79 y.o. female I MRN: [id number]  Unit/Bed#: ED 13 I Date of Admission: 5/24/2025   Date of Service: 5/25/2025 I Hospital Day: 0   Consults  Physician Requesting Evaluation: No att. providers found   Reason for Evaluation / Principal Problem: ventral hernia and questionable GB findings    Assessment & Plan  Ventral hernia  Small, firm ventral hernia just superior to umbilicus. Mild TTP.    Emergent surgical intervention not necessary at this time.  Likely outpatient follow up.  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  RUQ non-tender to palpation.  Ordered RUQ US.  Await results for final dispo.  Discussed with ED and general surgeon on call.  Medications reviewed. Continue current medications at prescribed doses.    History of Present Illness   Barb Pate is a 79 y.o. female who presents with a PMH of HTN.  She has a known ventral hernia which has been present for several years.  She states she has been having some abdominal pain since Monday.  It became worse last night, and so her daughter brought her to the ED.  She has not had anything to eat all day. She has no past surgical history.  She is a full code, verified by her daughter who is bedside.    Review of Systems   Constitutional: Negative.    HENT: Negative.     Eyes: Negative.    Respiratory: Negative.     Cardiovascular: Negative.    Gastrointestinal:         Mild abd wall pain. No N/V/Diarrhea.     Genitourinary: Negative.    Musculoskeletal: Negative.    Neurological: Negative.    Hematological: Negative.      Medical History Review: I have reviewed the patient's PMH, PSH, Social History, Family History, Meds, and Allergies     Objective :  Temp:  [97.8 °F (36.6 °C)] 97.8 °F (36.6 °C)  HR:  [73-94] 75  BP: (160-208)/(74-92) 162/77  Resp:  [20] 20  SpO2:  [96 %-100 %] 97 %  O2 Device: None (Room air)      Physical Exam  Vitals and nursing note reviewed.   Constitutional:       Appearance:  Normal appearance.   HENT:      Head: Normocephalic and atraumatic.     Eyes:      Extraocular Movements: Extraocular movements intact.       Cardiovascular:      Rate and Rhythm: Normal rate.   Pulmonary:      Effort: Pulmonary effort is normal.   Abdominal:      General: Abdomen is flat.      Palpations: Abdomen is soft.      Comments: Small quarter size firmness noted in abdominal wall superior to the umbilicus correlating with fat containing hernia seen on CT. + TTP of hernia.    NO RUQ pain.     Musculoskeletal:      Cervical back: Normal range of motion.     Neurological:      General: No focal deficit present.      Mental Status: She is alert and oriented to person, place, and time.     Psychiatric:         Mood and Affect: Mood normal.         Behavior: Behavior normal.         Lab Results: I have reviewed the following results:  Recent Labs     05/24/25  2331 05/25/25  0144   WBC 5.17  --    HGB 12.4  --    HCT 39.7  --      --    SODIUM 141  --    K 3.6  --      --    CO2 27  --    BUN 13  --    CREATININE 0.94  --    GLUC 100  --    AST 29  --    ALT 24  --    ALB 4.1  --    TBILI 0.83  --    ALKPHOS 75  --    HSTNI0 7  --    HSTNI2  --  7   LACTICACID 1.2  --        Imaging Results Review: I personally reviewed the following image studies in PACS and associated radiology reports: CT abdomen/pelvis. My interpretation of the radiology images/reports is: fat containing ventral hernia.  Other Study Results Review: No additional pertinent studies reviewed.    VTE Pharmacologic Prophylaxis: VTE covered by:    None     VTE Mechanical Prophylaxis: sequential compression device    Administrative Statements   I have spent a total time of 25 minutes in caring for this patient on the day of the visit/encounter including Diagnostic results, Risks and benefits of tx options, Patient and family education, Impressions, Counseling / Coordination of care, Documenting in the medical record, Reviewing/placing  orders in the medical record (including tests, medications, and/or procedures), Obtaining or reviewing history  , and Communicating with other healthcare professionals .

## 2025-05-25 NOTE — ASSESSMENT & PLAN NOTE
Small, firm ventral hernia just superior to umbilicus. Mild TTP.    Emergent surgical intervention not necessary at this time.  Likely outpatient follow up.

## 2025-05-25 NOTE — DISCHARGE INSTRUCTIONS
You have been evaluated in the Emergency Department today for abdominal pain. Your evaluation did not show evidence of medical conditions requiring emergent intervention at this time.    Please schedule an appointment with your primary care physician. Please follow up with GI and General Surgery.    Return to the Emergency Department if you experience worsening or uncontrolled pain, fevers 100.4°F or greater, recurrent vomiting, inability to tolerate food or fluids by mouth, bloody stools or vomit, black or tarry stools, or any other concerning symptoms     Most Recent Lfts (Optional): WNL Add High Risk Medication Management Associated Diagnosis?: No Length Of Therapy (Optional): 5 months Current Dosage: 2mg Daily Detail Level: Zone Most Recent Ppd/Tb Screen (Optional): negative

## 2025-05-25 NOTE — ED PROVIDER NOTES
Emergency Department Sign Out Note        Sign out and transfer of care from Daniel Preciado PA-C. See Separate Emergency Department note.     The patient, Barb Pate, was evaluated by the previous provider for abdominal pain.    Workup Completed:  CT - suggesting US evaluation to r/o acute cholecystitis.   Labs.     ED Course / Workup Pending (followup):  US        No data recorded                          ED Course as of 05/25/25 1048   Sun May 25, 2025   0804 Waiting on US can go home if negative   0927 US right upper quadrant  IMPRESSION:     1.  Cholelithiasis without other sonographic evidence of acute cholecystitis.  2.  Right renal cysts     1001 Waiting for surgery     Procedures  Medical Decision Making  79 yr old female presented for abdominal pain radiating to back. Patient sign out from night team. Patient well appearing at bed side. US pending at time of sign out. US US IMPRESSION:1. Cholelithiasis without other sonographic evidence of acute cholecystitis. 2. Right renal cysts.     Discussed with surgery Dr. Rico says patient can be discharged home with outpatient follow up. Discussed with patient who is comfortable with plan. Surgery and GI referral given. Prior to discharge, discharge instructions were discussed with patient at bedside. Patient was provided both verbal and written instructions. Patient is understanding of the discharge instructions and is agreeable to plan of care. Return precautions were discussed with patient bedside, patient verbalized understanding of signs and symptoms that would necessitate return to the ED. All questions were answered. Patient was comfortable with the plan of care and discharged to home.        Amount and/or Complexity of Data Reviewed  Labs: ordered.  Radiology: ordered and independent interpretation performed. Decision-making details documented in ED Course.    Risk  OTC drugs.  Prescription drug management.            Disposition  Final diagnoses:    Abdominal pain   Cholelithiasis     Time reflects when diagnosis was documented in both MDM as applicable and the Disposition within this note       Time User Action Codes Description Comment    5/25/2025  8:09 AM Daniel Preciado Add [R10.9] Abdominal pain     5/25/2025  9:30 AM Shawnee Phan [K80.20] Cholelithiasis           ED Disposition       ED Disposition   Discharge    Condition   Stable    Date/Time   Sun May 25, 2025  9:30 AM    Comment   Barbankita Pate discharge to home/self care.                   Follow-up Information       Follow up With Specialties Details Why Contact Info    Latrell Hickey MD Family Medicine Schedule an appointment as soon as possible for a visit in 2 days  [address]  [address]  [facility] [id number] [phone]      Chong Flaherty MD General Surgery Call Make an appointment to schedule repair of the umbilical hernia [address]  [address]  [facility] [address]  [phone]            Patient's Medications   Discharge Prescriptions    No medications on file            ED Provider  Electronically Signed by     Shawnee Phan PA-C  05/25/25 2335

## 2025-05-25 NOTE — ED PROVIDER NOTES
Time reflects when diagnosis was documented in both MDM as applicable and the Disposition within this note       Time User Action Codes Description Comment    5/25/2025  8:09 AM Daniel Preciado Add [R10.9] Abdominal pain           ED Disposition       None          Assessment & Plan   {Hyperlinks  Risk Stratification - NIHSS - HEART SCORE - Fill out sepsis note and make sure you call [phone] if severe or septic shock:[id number]}    Medical Decision Making  Abdominal exam without peritoneal signs. No evidence of acute abdomen at this time. Well appearing. Given work up, low suspicion for acute hepatobiliary disease (including acute cholecystitis or cholangitis), acute pancreatitis (neg lipase), PUD (including gastric perforation), acute infectious processes (pneumonia, hepatitis, pyelonephritis), acute appendicitis, vascular catastrophe, bowel obstruction, viscus perforation, or genital torsion, diverticulitis. CT abdomen and pelvis showed 1. There is a 3.0 cm fat and fluid containing umbilical hernia with minimal fat stranding on series 602, image 97. Mild adjacent subcutaneous soft tissue edema. This may represent an infectious or inflammatory process. Incarcerated fat cannot be excluded in the appropriate clinical setting. 2. Partially contracted gallbladder. Suggestion of cholelithiasis. The gallbladder wall is prominent. There may be trace pericholecystic inflammation. Correlate clinically for acute cholecystitis. Right upper quadrant ultrasound may be performed for further evaluation. 3. Suggestion of mild wall thickening in the visualized distal esophagus. This may be related to underdistention or esophagitis. There may also be an outpouching from the distal esophagus on series 2, image 21. This may be redundant anatomy. A diverticulum cannot be excluded. 4. Mildly thickened small bowel loops in the left hemiabdomen may be related to underdistention or enteritis. 3.3 cm unopacified bowel loop versus diverticulum  in the region of the left-sided small bowel on series 2, image 71. 5. Colonic diverticulosis without diverticulitis. 6. There are multiple bilateral renal hypodensities measuring up to 7.5 cm in the upper right kidney, where there is mild calcification and septation on series 601, image 87. These may represent cysts, similar to prior. 7. 8 mm left lower lobe nodule, similar to prior.  Discussed with general surgery on-call who recommended waiting in the ER for an ultrasound of the right upper quadrant.  Discussed this with the patient who is amenable to this.  Signed out to Feroz Phan PA-C pending RUQ ultrasound, gen surg recommendation and dispo.     Problems Addressed:  Abdominal pain: acute illness or injury    Amount and/or Complexity of Data Reviewed  Labs: ordered. Decision-making details documented in ED Course.  Radiology: ordered and independent interpretation performed. Decision-making details documented in ED Course.    Risk  OTC drugs.  Prescription drug management.        ED Course as of 05/25/25 0816   Sun May 25, 2025   0151 CT abdomen pelvis with contrast  [facility] read:  1. There is a 3.0 cm fat and fluid containing umbilical hernia with minimal fat stranding on series 602, image 97. Mild adjacent subcutaneous soft tissue edema. This may represent an infectious or inflammatory process. Incarcerated fat cannot be excluded in the appropriate clinical setting. 2. Partially contracted gallbladder. Suggestion of cholelithiasis. The gallbladder wall is prominent. There may be trace pericholecystic inflammation. Correlate clinically for acute cholecystitis. Right upper quadrant ultrasound may be performed for further evaluation. 3. Suggestion of mild wall thickening in the visualized distal esophagus. This may be related to underdistention or esophagitis. There may also be an outpouching from the distal esophagus on series 2, image 21. This may be redundant anatomy. A diverticulum cannot be excluded. 4.  Mildly thickened small bowel loops in the left hemiabdomen may be related to underdistention or enteritis. 3.3 cm unopacified bowel loop versus diverticulum in the region of the left-sided small bowel on series 2, image 71. 5. Colonic diverticulosis without diverticulitis. 6. There are multiple bilateral renal hypodensities measuring up to 7.5 cm in the upper right kidney, where there is mild calcification and septation on series 601, image 87. These may represent cysts, similar to prior. 7. 8 mm left lower lobe nodule, similar to prior     0220 Delta 2hr hsTnI: 0   0521 CT with signs concerning for cholecystitis.  Discussed with general surgery on-call who recommended obtaining ultrasound of the right upper quadrant.       Medications   morphine injection 2 mg (2 mg Intravenous Not Given 5/25/25 0121)   iohexol (OMNIPAQUE) 350 MG/ML injection (MULTI-DOSE) 100 mL (100 mL Intravenous Given 5/25/25 0028)   acetaminophen (TYLENOL) tablet 650 mg (650 mg Oral Given 5/25/25 0120)       ED Risk Strat Scores                    No data recorded        SBIRT 22yo+      Flowsheet Row Most Recent Value   Initial Alcohol Screen: US AUDIT-C     1. How often do you have a drink containing alcohol? 0 Filed at: 05/24/2025 2313   2. How many drinks containing alcohol do you have on a typical day you are drinking?  0 Filed at: 05/24/2025 2313   3a. Male UNDER 65: How often do you have five or more drinks on one occasion? 0 Filed at: 05/24/2025 2313   3b. FEMALE Any Age, or MALE 65+: How often do you have 4 or more drinks on one occassion? 0 Filed at: 05/24/2025 2313   Audit-C Score 0 Filed at: 05/24/2025 2313   CECILY: How many times in the past year have you...    Used an illegal drug or used a prescription medication for non-medical reasons? Never Filed at: 05/24/2025 2313                            History of Present Illness   {Hyperlinks  History (Med, Surg, Fam, Social) - Current Medications - Allergies  :[id number]}    Chief  Complaint   Patient presents with    Abdominal Pain     Pt reports bilateral lower abdominal pain that radiates to bilateral lower back. Pt reports this started Monday. Pt denies urinary issues and N/V/D.        Past Medical History[1]   Past Surgical History[2]   Family History[3]   Social History[4]   E-Cigarette/Vaping    E-Cigarette Use Never User       E-Cigarette/Vaping Substances      I have reviewed and agree with the history as documented.     The patient is a 79 y.o. female with a history of HTN who presents to [facility] with a chief complaint of abdominal pain. Symptoms began 2-3 days ago and have been constant since onset. Her pain is currently rated as a 5/10 in severity and described as periumbilical with radiation to the flanks. Associated symptoms include none noted. Symptoms are aggravated with none noted and alleviating factors include none noted. The patient denies fever, chills, night sweats, chest pain, shortness of breath, cough, sputum, hemoptysis, hematemesis, hematochezia, melena, constipation, diarrhea, vomiting, nausea, falls, trauma. No other reported symptoms at this time.  Patient affirms allergies to doxycycline, lido-menthol-mehtyl Sal-camph, salicylates              History provided by:  Patient   used: No    Abdominal Pain  Associated symptoms: no chest pain, no chills, no cough, no dysuria, no fever, no hematuria, no shortness of breath, no sore throat and no vomiting        Review of Systems   Constitutional:  Negative for chills and fever.   HENT:  Negative for ear pain and sore throat.    Eyes:  Negative for pain and visual disturbance.   Respiratory:  Negative for cough and shortness of breath.    Cardiovascular:  Negative for chest pain and palpitations.   Gastrointestinal:  Positive for abdominal pain. Negative for vomiting.   Genitourinary:  Negative for dysuria and hematuria.   Musculoskeletal:  Negative for arthralgias and back pain.    Skin:  Negative for color change and rash.   Neurological:  Negative for dizziness, seizures, syncope, facial asymmetry, light-headedness, numbness and headaches.   All other systems reviewed and are negative.          Objective   {Hyperlinks  Historical Vitals - Historical Labs - Chart Review/Microbiology - Last Echo - Code Status  :[id number]}    ED Triage Vitals   Temperature Pulse Blood Pressure Respirations SpO2 Patient Position - Orthostatic VS   05/24/25 2311 05/24/25 2311 05/24/25 2311 05/24/25 2311 05/24/25 2311 05/24/25 2311   97.8 °F (36.6 °C) 93 (!) 199/91 20 99 % Sitting      Temp Source Heart Rate Source BP Location FiO2 (%) Pain Score    05/24/25 2311 05/24/25 2311 05/24/25 2311 -- 05/25/25 0120    Temporal Monitor Left arm  4      Vitals      Date and Time Temp Pulse SpO2 Resp BP Pain Score FACES Pain Rating User   05/25/25 0700 -- 70 100 % 20 189/84 -- --    05/25/25 0400 -- 75 97 % -- 162/77 -- --    05/25/25 0400 -- -- -- 20 -- -- --    05/25/25 0330 -- 73 96 % -- 160/74 -- --    05/25/25 0300 -- 75 97 % -- 169/81 -- --    05/25/25 0246 -- -- -- -- -- 2 --    05/25/25 0230 -- 80 100 % -- 208/91 -- --    05/25/25 0225 -- 80 98 % -- 177/81 -- --    05/25/25 0130 -- 85 98 % 20 190/92 -- --    05/25/25 0125 -- 94 99 % -- 184/91 -- --    05/25/25 0120 -- -- -- -- -- 4 --    05/24/25 2311 97.8 °F (36.6 °C) 93 99 % 20 199/91 -- -- NO            Physical Exam  Vitals reviewed.   Constitutional:       General: She is not in acute distress.     Appearance: She is not ill-appearing or toxic-appearing.   HENT:      Head: Normocephalic.      Mouth/Throat:      Mouth: Mucous membranes are moist.      Pharynx: No pharyngeal swelling or oropharyngeal exudate.     Eyes:      General: No scleral icterus.     Extraocular Movements: Extraocular movements intact.       Cardiovascular:      Rate and Rhythm: Normal rate.   Pulmonary:      Effort: Pulmonary effort is normal. No respiratory  distress.      Breath sounds: No stridor. No wheezing, rhonchi or rales.   Abdominal:      General: Abdomen is flat. Bowel sounds are normal.      Palpations: Abdomen is soft.      Tenderness: There is abdominal tenderness in the periumbilical area. There is no right CVA tenderness, left CVA tenderness, guarding or rebound.     Skin:     General: Skin is warm and dry.      Capillary Refill: Capillary refill takes less than 2 seconds.      Coloration: Skin is not cyanotic or mottled.      Findings: No erythema.     Neurological:      Mental Status: She is alert and oriented to person, place, and time.         Results Reviewed       Procedure Component Value Units Date/Time    HS Troponin I 2hr [[id number]]  (Normal) Collected: 05/25/25 0144    Lab Status: Final result Specimen: Blood from Arm, Left Updated: 05/25/25 0216     hs TnI 2hr 7 ng/L      Delta 2hr hsTnI 0 ng/L     Urine Microscopic [[id number]]  (Abnormal) Collected: 05/25/25 0120    Lab Status: Final result Specimen: Urine, Clean Catch Updated: 05/25/25 0134     RBC, UA 2-4 /hpf      WBC, UA 1-2 /hpf      Epithelial Cells Occasional /hpf      Bacteria, UA None Seen /hpf     UA w Reflex to Microscopic w Reflex to Culture [[id number]]  (Abnormal) Collected: 05/25/25 0120    Lab Status: Final result Specimen: Urine, Clean Catch Updated: 05/25/25 0133     Color, UA Light Yellow     Clarity, UA Clear     Specific Gravity, UA 1.025     pH, UA 7.0     Leukocytes, UA Negative     Nitrite, UA Negative     Protein, UA Trace mg/dl      Glucose, UA Negative mg/dl      Ketones, UA Negative mg/dl      Urobilinogen, UA <2.0 mg/dl      Bilirubin, UA Negative     Occult Blood, UA Trace    HS Troponin 0hr (reflex protocol) [[id number]]  (Normal) Collected: 05/24/25 2331    Lab Status: Final result Specimen: Blood from Arm, Left Updated: 05/25/25 0002     hs TnI 0hr 7 ng/L     Lipase [[id number]]  (Normal) Collected: 05/24/25 2331    Lab Status: Final result Specimen: Blood from  Arm, Left Updated: 05/24/25 2354     Lipase 11 u/L     Lactic acid, plasma (w/reflex if result > 2.0) [[id number]]  (Normal) Collected: 05/24/25 2331    Lab Status: Final result Specimen: Blood from Arm, Left Updated: 05/24/25 2354     LACTIC ACID 1.2 mmol/L     Narrative:      Result may be elevated if tourniquet was used during collection.    Comprehensive metabolic panel [[id number]] Collected: 05/24/25 2331    Lab Status: Final result Specimen: Blood from Arm, Left Updated: 05/24/25 2354     Sodium 141 mmol/L      Potassium 3.6 mmol/L      Chloride 107 mmol/L      CO2 27 mmol/L      ANION GAP 7 mmol/L      BUN 13 mg/dL      Creatinine 0.94 mg/dL      Glucose 100 mg/dL      Calcium 9.8 mg/dL      AST 29 U/L      ALT 24 U/L      Alkaline Phosphatase 75 U/L      Total Protein 7.8 g/dL      Albumin 4.1 g/dL      Total Bilirubin 0.83 mg/dL      eGFR 57 ml/min/1.73sq m     Narrative:      [employer] guidelines for Chronic Kidney Disease (CKD):     Stage 1 with normal or high GFR (GFR > 90 mL/min/1.73 square meters)    Stage 2 Mild CKD (GFR = 60-89 mL/min/1.73 square meters)    Stage 3A Moderate CKD (GFR = 45-59 mL/min/1.73 square meters)    Stage 3B Moderate CKD (GFR = 30-44 mL/min/1.73 square meters)    Stage 4 Severe CKD (GFR = 15-29 mL/min/1.73 square meters)    Stage 5 End Stage CKD (GFR <15 mL/min/1.73 square meters)  Note: GFR calculation is accurate only with a steady state creatinine    CBC and differential [[id number]]  (Abnormal) Collected: 05/24/25 2331    Lab Status: Final result Specimen: Blood from Arm, Left Updated: 05/24/25 2338     WBC 5.17 Thousand/uL      RBC 4.32 Million/uL      Hemoglobin 12.4 g/dL      Hematocrit 39.7 %      MCV 92 fL      MCH 28.7 pg      MCHC 31.2 g/dL      RDW 14.0 %      MPV 11.5 fL      Platelets 179 Thousands/uL      nRBC 0 /100 WBCs      Segmented % 51 %      Immature Grans % 0 %      Lymphocytes % 34 %      Monocytes % 10 %      Eosinophils Relative 4  %      Basophils Relative 1 %      Absolute Neutrophils 2.70 Thousands/µL      Absolute Immature Grans 0.01 Thousand/uL      Absolute Lymphocytes 1.75 Thousands/µL      Absolute Monocytes 0.49 Thousand/µL      Eosinophils Absolute 0.19 Thousand/µL      Basophils Absolute 0.03 Thousands/µL             XR chest 2 views   ED Interpretation by Daniel Preciado PA-C ( [id number])   No acute cardiopulmonary disease      CT abdomen pelvis with contrast    (Results Pending)   US right upper quadrant    (Results Pending)       Procedures    ED Medication and Procedure Management   Prior to Admission Medications   Prescriptions Last Dose Informant Patient Reported? Taking?   HYDROcodone-acetaminophen (NORCO) 5-325 mg per tablet   No No   Sig: Take 1 tablet by mouth every 6 (six) hours as needed (severe pain, not otherwise controlled)Max Daily Amount: 4 tablets   Probiotic Product (Misc Intestinal Eugenia Regulat) CAPS   Yes No   Sig: Take 1 capsule by mouth   Probiotic Product (Misc Intestinal Eugenia Regulat) CAPS   Yes No   Sig: Take 1 capsule by mouth   amLODIPine (NORVASC) 5 mg tablet   No No   Sig: Take 1 tablet (5 mg total) by mouth in the morning.   aspirin 81 mg chewable tablet   Yes No   Sig: Chew 81 mg daily   chlorthalidone 25 mg tablet   No No   Sig: Take 1 tablet by mouth daily   chlorthalidone 25 mg tablet   Yes No   Sig: Take 1 tablet by mouth in the morning.   famotidine (PEPCID) 20 mg tablet   Yes No   Sig: Take 20 mg by mouth in the morning.   ferrous sulfate 325 (65 Fe) mg tablet   Yes No   Sig: Take 1 tablet by mouth in the morning.   fluticasone (FLONASE) 50 mcg/act nasal spray   No No   Si spray into each nostril daily   labetalol (NORMODYNE) 200 mg tablet   No No   Sig: Take 2 tablets by mouth every 12 (twelve) hours   magnesium oxide (MAG-OX) 400 mg   No No   Sig: Take 1 tablet by mouth daily for 5 days   meclizine (ANTIVERT) 25 mg tablet   Yes No   Sig: Take 1 tablet by mouth as needed in the morning and  1 tablet as needed at noon and 1 tablet as needed in the evening.   omeprazole (PriLOSEC) 20 mg delayed release capsule   Yes No   Sig: Take 20 mg by mouth daily   ondansetron (ZOFRAN-ODT) 4 mg disintegrating tablet   No No   Sig: Take 1 tablet (4 mg total) by mouth every 6 (six) hours as needed for nausea or vomiting   ondansetron (ZOFRAN-ODT) 4 mg disintegrating tablet   No No   Sig: Take 1 tablet (4 mg total) by mouth every 8 (eight) hours as needed for nausea or vomiting   polyvinyl alcohol (LIQUIFILM TEARS) 1.4 % ophthalmic solution   Yes No   Sig: Apply 1 drop to eye   potassium chloride (K-DUR,KLOR-CON) 20 mEq tablet   No No   Sig: Take 2 tablets by mouth 2 (two) times a day   potassium chloride (Klor-Con M20) 20 mEq tablet   Yes No   Sig: Take 2 tablets by mouth in the morning and 2 tablets in the evening.   predniSONE 10 mg tablet   No No   Sig: Take 1 tablet (10 mg total) by mouth daily 6 tabs for 3 days and then decrease by one tab every 3 days until complete      Facility-Administered Medications: None     Patient's Medications   Discharge Prescriptions    No medications on file     No discharge procedures on file.  ED SEPSIS DOCUMENTATION   Time reflects when diagnosis was documented in both MDM as applicable and the Disposition within this note       Time User Action Codes Description Comment    5/25/2025  8:09 AM Daniel Preciado Add [R10.9] Abdominal pain                      [1]   Past Medical History:  Diagnosis Date    Hypertension    [2]   Past Surgical History:  Procedure Laterality Date    BREAST CYST EXCISION     [3]   Family History  Family history unknown: Yes   [4]   Social History  Tobacco Use    Smoking status: Never    Smokeless tobacco: Never   Vaping Use    Vaping status: Never Used   Substance Use Topics    Alcohol use: Yes     Comment: Socially    Drug use: No